# Patient Record
Sex: MALE | Race: WHITE | HISPANIC OR LATINO | Employment: OTHER | URBAN - METROPOLITAN AREA
[De-identification: names, ages, dates, MRNs, and addresses within clinical notes are randomized per-mention and may not be internally consistent; named-entity substitution may affect disease eponyms.]

---

## 2017-02-09 ENCOUNTER — OFFICE VISIT (OUTPATIENT)
Dept: FAMILY MEDICINE | Facility: CLINIC | Age: 72
End: 2017-02-09
Payer: COMMERCIAL

## 2017-02-09 VITALS
DIASTOLIC BLOOD PRESSURE: 70 MMHG | OXYGEN SATURATION: 98 % | TEMPERATURE: 97.8 F | HEART RATE: 65 BPM | BODY MASS INDEX: 29.16 KG/M2 | WEIGHT: 175 LBS | RESPIRATION RATE: 16 BRPM | SYSTOLIC BLOOD PRESSURE: 124 MMHG | HEIGHT: 65 IN

## 2017-02-09 DIAGNOSIS — J20.9 ACUTE BRONCHITIS, UNSPECIFIED ORGANISM: Primary | ICD-10-CM

## 2017-02-09 PROCEDURE — 99213 OFFICE O/P EST LOW 20 MIN: CPT | Performed by: INTERNAL MEDICINE

## 2017-02-09 ASSESSMENT — PAIN SCALES - GENERAL: PAINLEVEL: MILD PAIN (2)

## 2017-02-09 NOTE — NURSING NOTE
"Chief Complaint   Patient presents with     URI       Initial /70 mmHg  Pulse 65  Temp(Src) 97.8  F (36.6  C) (Oral)  Resp 16  Ht 5' 4.75\" (1.645 m)  Wt 175 lb (79.379 kg)  BMI 29.33 kg/m2  SpO2 98% Estimated body mass index is 29.33 kg/(m^2) as calculated from the following:    Height as of this encounter: 5' 4.75\" (1.645 m).    Weight as of this encounter: 175 lb (79.379 kg).  Medication Reconciliation: complete   Cynthia Abdullahi CMA      "

## 2017-02-09 NOTE — PATIENT INSTRUCTIONS
I suggest you try using Guaifenesin [ mucinex ] as well as other Supportive measures     Rest   Push fluids   Acetaminophen prn for fever and aches and pains  Guaifenesin [ mucinex ] can help for sinus congestion   Robitussin DM, 1-2 tsp q 4-6 hours can help with congestion / coughing   Sucrets or other throat lozenges can help for sore throat along with gargling with warm salt water     [facility]    If you have any questions regarding to your visit please contact your care team:     Team Pink:   Clinic Hours Telephone Number   Internal Medicine:  Dr. Tammy Hernandez, NP       7am-7pm  Monday - Thursday   7am-5pm  Fridays  [phone]  (Appointment scheduling available 24/7)    Questions about your visit?  Team Line  [phone]   Urgent Care - [place] and [facility] - 11am-9pm Monday-Friday Saturday-Sunday- 9am-5pm   [place] - 5pm-9pm Monday-Friday Saturday-Sunday- 9am-5pm  [phone] - [facility]  [phone] - [place]       What options do I have for visits at the clinic other than the traditional office visit?  To expand how we care for you, many of our providers are utilizing electronic visits (e-visits) and telephone visits, when medically appropriate, for interactions with their patients rather than a visit in the clinic.   We also offer nurse visits for many medical concerns. Just like any other service, we will bill your insurance company for this type of visit based on time spent on the phone with your provider. Not all insurance companies cover these visits. Please check with your medical insurance if this type of visit is covered. You will be responsible for any charges that are not paid by your insurance.      E-visits via [employer]:  generally incur a $35.00 fee.  Telephone visits:  Time spent on the phone: *charged based on time that is spent on the phone in increments of 10 minutes. Estimated cost:   5-10 mins $30.00   11-20  mins. $59.00   21-30 mins. $85.00   Use [employer] (secure email communication and access to your chart) to send your primary care provider a message or make an appointment. Ask someone on your Team how to sign up for [employer].    For a Price Quote for your services, please call our Consumer Price Line at [phone].    As always, Thank you for trusting us with your health care needs!    Discharged by SAMY Roach

## 2017-02-09 NOTE — PROGRESS NOTES
SUBJECTIVE:                                                    Chi Newman is a 72 year old male who presents to clinic today for the following health issues:    Acute bronchitis, unspecified organism     He suspected he had a sinus infection ? Sinus congestion, some sweatiness later in the evening, a lot of sinus congestion, taking Allegra (fexofenadine) and fluticasone (FLONASE) 50 MCG/ACT nasal spray , using the SinuCleanse  Neti Pot some.    RESPIRATORY SYMPTOMS      Duration: 2 weeks    Description  nasal congestion, rhinorrhea, facial pain/pressure, fever, chills, ear pain bilateral, headache, fatigue/malaise, hoarse voice and myalgias    Severity: moderate    Accompanying signs and symptoms: None    History (predisposing factors):  none    Precipitating or alleviating factors: None    Therapies tried and outcome:  rest and fluids oral decongestant antihistamine acetaminophen OTC NSAID       Problem list and histories reviewed & adjusted, as indicated.  Additional history: as documented    Patient Active Problem List   Diagnosis     Health Care Home     Advanced directives, counseling/discussion     Hyperlipidemia with target LDL less than 130     Diverticulosis of colon     Elevated liver enzymes     Hypovitaminosis D     Environmental allergies     Seasonal allergic rhinitis     Pneumonia     BPH (benign prostatic hyperplasia)     Diverticulitis of colon     Chronic nonalcoholic liver disease     Microscopic hematuria     Umbilical hernia     Elevated glucose     Elevated prostate specific antigen (PSA)     [place] of macula of both eyes     Posterior vitreous detachment of right eye     Nonexudative senile macular degeneration of retina     Senile nuclear sclerosis, bilateral     Slow transit constipation     Vitreous syneresis of left eye     PVD (posterior vitreous detachment), both eyes     Past Surgical History   Procedure Laterality Date     Arthroscopy knee  7/12/96     right knee, LMT      "Arthroscopy knee  1988     left knee, MMT     Tonsillectomy & adenoidectomy  age of 15     Colonoscopy  10/7/2002     diverticulosis, due again in      Small bowel resection  3/19/2014     small bowel resection, lysis of adhesions and pelvic drain placement     Colonoscopy  2013       Social History   Substance Use Topics     Smoking status: Never Smoker      Smokeless tobacco: Never Used     Alcohol Use: 0.0 oz/week     0 Standard drinks or equivalent per week      Comment: Occasional glass of wine and beer.     Family History   Problem Relation Age of Onset     DIABETES Brother      My brother  from complications of diabetes     CANCER Mother      Colon Cancer     Glaucoma No family hx of      Macular Degeneration No family hx of          Current Outpatient Prescriptions   Medication Sig Dispense Refill     pravastatin (PRAVACHOL) 20 MG tablet Take 1 tablet (20 mg) by mouth three times a week 36 tablet 3     fexofenadine (ALLEGRA) 180 MG tablet Take 1 tablet (180 mg) by mouth daily Take 180 mg by mouth once daily. Indications: SEASONAL ALLERGIC RHINITIS 90 tablet 3     fluticasone (FLONASE) 50 MCG/ACT nasal spray Spray 1-2 sprays into both nostrils daily 16 g 12     Propylene Glycol (SYSTANE BALANCE OP) Apply 1 drop to eye 4 times daily       ibuprofen (ADVIL,MOTRIN) 200 MG tablet Take 200 mg by mouth every 4 hours as needed       acetaminophen (TYLENOL) 500 MG tablet Take 500-1,000 mg by mouth every 6 hours as needed       NAPHAZOLINE-PHENIRAMINE OP \"OTC allergy eye drop-TARGET BRAND\" 2 drops into each eye daily as needed       Allergies   Allergen Reactions     Atorvastatin      Vytorin      Vancomycin Rash     Rash/hives     Recent Labs   Lab Test  10/18/16   1357  16   0921  16   1137  10/15/15   1412  10/08/14   0819  13   1453   A1C  5.5   --    --   5.6  5.4   --    LDL   --   123*  122*  150*  133*  137*   HDL   --   36*  35*   --   38*  28*   TRIG   --   147  186*   --   " "133   --    ALT  34   --    --   46  43  65   CR  0.99   --    --   0.91  1.09  0.98   GFRESTIMATED  75   --    --   83  67  77   GFRESTBLACK  >90   GFR Calc     --    --   >90   GFR Calc    81  >90   POTASSIUM  4.3   --    --   3.9  4.4  3.8   TSH   --    --    --    --    --   1.96      BP Readings from Last 3 Encounters:   02/09/17 124/70   10/18/16 126/64   09/13/16 106/62    Wt Readings from Last 3 Encounters:   02/09/17 175 lb (79.379 kg)   10/18/16 172 lb 3.2 oz (78.109 kg)   09/22/16 174 lb (78.926 kg)                  Labs reviewed in EPIC  Problem list, Medication list, Allergies, and Medical/Social/Surgical histories reviewed in [facility] and updated as appropriate.    ROS:  Constitutional, HEENT, cardiovascular, pulmonary, gi and gu systems are negative, except as otherwise noted.    OBJECTIVE:                                                    /70 mmHg  Pulse 65  Temp(Src) 97.8  F (36.6  C) (Oral)  Resp 16  Ht 5' 4.75\" (1.645 m)  Wt 175 lb (79.379 kg)  BMI 29.33 kg/m2  SpO2 98%  Body mass index is 29.33 kg/(m^2).  GENERAL APPEARANCE: healthy, alert and no distress  EYES: Eyes grossly normal to inspection, PERRL and conjunctivae and sclerae normal  HENT: ear canals and TM's normal and nose and mouth without ulcers or lesions  NECK: no adenopathy, no asymmetry, masses, or scars and thyroid normal to palpation  RESP: lungs clear to auscultation - no rales, rhonchi or wheezes  CV: regular rates and rhythm, normal S1 S2, no S3 or S4 and no murmur, click or rub    Diagnostic test results:  Diagnostic Test Results:  none      ASSESSMENT/PLAN:                                                    1. Acute bronchitis, unspecified organism  Mr. Chi Newman is a patient who has felt under the weather with some smouldering upper respiratory tract infection  Symptoms. He's had a total run of probably close to 2-3 weeks of feeling not up to snuff, however there were a few days " in the middle where he seemed better and this is suggestive of 2 sequential upper respiratory tract infections. We reviewed his symptoms and did the exam and this is strong consistent with a viral syndrome , no indications for antibiotics at this point. We discussed what to be on the watch for. Update me if significant changes have taken place. Supportive measures reviewed ,    Rest   Push fluids   Acetaminophen prn for fever and aches and pains  Guaifenesin [ mucinex ] can help for sinus congestion   Robitussin DM, 1-2 tsp q 4-6 hours can help with congestion / coughing   Sucrets or other throat lozenges can help for sore throat along with gargling with warm salt water         Follow up with Provider - depending on how things go , return to clinic for recheck in 7-10 days  if not improved      Isaac Knutson MD  [facility]

## 2017-02-09 NOTE — MR AVS SNAPSHOT
After Visit Summary   2/9/2017    Chi Newman    MRN: [id number]           Patient Information     Date Of Birth          1945        Visit Information        Provider Department      2/9/2017 1:50 PM Isaac Knutson MD [facility] Instructions    I suggest you try using Guaifenesin [ mucinex ] as well as other Supportive measures     Rest   Push fluids   Acetaminophen prn for fever and aches and pains  Guaifenesin [ mucinex ] can help for sinus congestion   Robitussin DM, 1-2 tsp q 4-6 hours can help with congestion / coughing   Sucrets or other throat lozenges can help for sore throat along with gargling with warm salt water     [facility]    If you have any questions regarding to your visit please contact your care team:     Team Pink:   Clinic Hours Telephone Number   Internal Medicine:  Dr. Tammy Hernandez NP       7am-7pm  Monday - Thursday   7am-5pm  Fridays  [phone]  (Appointment scheduling available 24/7)    Questions about your visit?  Team Line  [phone]   Urgent Care - [place] and [place] - 11am-9pm Monday-Friday Saturday-Sunday- 9am-5pm   [place] - 5pm-9pm Monday-Friday Saturday-Sunday- 9am-5pm  [phone] - [place]   [phone] - [place]       What options do I have for visits at the clinic other than the traditional office visit?  To expand how we care for you, many of our providers are utilizing electronic visits (e-visits) and telephone visits, when medically appropriate, for interactions with their patients rather than a visit in the clinic.   We also offer nurse visits for many medical concerns. Just like any other service, we will bill your insurance company for this type of visit based on time spent on the phone with your provider. Not all insurance companies cover these visits. Please check with your medical insurance if this type of visit is covered. You will be  responsible for any charges that are not paid by your insurance.      E-visits via [employer]:  generally incur a $35.00 fee.  Telephone visits:  Time spent on the phone: *charged based on time that is spent on the phone in increments of 10 minutes. Estimated cost:   5-10 mins $30.00   11-20 mins. $59.00   21-30 mins. $85.00   Use [employer] (secure email communication and access to your chart) to send your primary care provider a message or make an appointment. Ask someone on your Team how to sign up for [employer].    For a Price Quote for your services, please call our [facility] Line at [phone].    As always, Thank you for trusting us with your health care needs!    Discharged by SAMY Roach          Follow-ups after your visit        Who to contact     If you have questions or need follow up information about today's clinic visit or your schedule please contact [facility] directly at [phone].  Normal or non-critical lab and imaging results will be communicated to you by MyChart, letter or phone within 4 business days after the clinic has received the results. If you do not hear from us within 7 days, please contact the clinic through [employer] or phone. If you have a critical or abnormal lab result, we will notify you by phone as soon as possible.  Submit refill requests through [employer] or call your pharmacy and they will forward the refill request to us. Please allow 3 business days for your refill to be completed.          Additional Information About Your Visit        [employer] Information     [employer] gives you secure access to your electronic health record. If you see a primary care provider, you can also send messages to your care team and make appointments. If you have questions, please call your primary care clinic.  If you do not have a primary care provider, please call [phone] and they will assist you.        Care EveryWhere ID     This is your Care EveryWhere ID. This could be  "used by other organizations to access your [place] medical records  [id number]        Your Vitals Were     Pulse Temperature Respirations Height BMI (Body Mass Index) Pulse Oximetry    65 97.8  F (36.6  C) (Oral) 16 5' 4.75\" (1.645 m) 29.33 kg/m2 98%       Blood Pressure from Last 3 Encounters:   02/09/17 124/70   10/18/16 126/64   09/13/16 106/62    Weight from Last 3 Encounters:   02/09/17 175 lb (79.379 kg)   10/18/16 172 lb 3.2 oz (78.109 kg)   09/22/16 174 lb (78.926 kg)              Today, you had the following     No orders found for display       Primary Care Provider Office Phone # Fax #    Isaac Knutson [phone]       [address] [address]        Thank you!     Thank you for choosing [facility]  for your care. Our goal is always to provide you with excellent care. Hearing back from our patients is one way we can continue to improve our services. Please take a few minutes to complete the written survey that you may receive in the mail after your visit with us. Thank you!             Your Updated Medication List - Protect others around you: Learn how to safely use, store and throw away your medicines at www.disposemymeds.org.          This list is accurate as of: 2/9/17  2:25 PM.  Always use your most recent med list.                   Brand Name Dispense Instructions for use    acetaminophen 500 MG tablet    TYLENOL     Take 500-1,000 mg by mouth every 6 hours as needed       fexofenadine 180 MG tablet    ALLEGRA    90 tablet    Take 1 tablet (180 mg) by mouth daily Take 180 mg by mouth once daily. Indications: SEASONAL ALLERGIC RHINITIS       fluticasone 50 MCG/ACT spray    FLONASE    16 g    Spray 1-2 sprays into both nostrils daily       ibuprofen 200 MG tablet    ADVIL/MOTRIN     Take 200 mg by mouth every 4 hours as needed       NAPHAZOLINE-PHENIRAMINE OP      \"OTC allergy eye drop-TARGET BRAND\" 2 drops into each eye daily " as needed       pravastatin 20 MG tablet    PRAVACHOL    36 tablet    Take 1 tablet (20 mg) by mouth three times a week       SYSTANE BALANCE OP      Apply 1 drop to eye 4 times daily

## 2017-04-17 ENCOUNTER — MYC MEDICAL ADVICE (OUTPATIENT)
Dept: FAMILY MEDICINE | Facility: CLINIC | Age: 72
End: 2017-04-17

## 2017-04-17 DIAGNOSIS — K58.2 IRRITABLE BOWEL SYNDROME WITH BOTH CONSTIPATION AND DIARRHEA: Primary | ICD-10-CM

## 2017-04-17 NOTE — TELEPHONE ENCOUNTER
Patient does not have a diagnosis of irritable bowel and has not been seen for this?  Please advise    Jl Beckford RN

## 2017-04-17 NOTE — TELEPHONE ENCOUNTER
Called patient and scheduled him for an appointment tomorrow for lower abd pain, exacerbated after meals    Jl Beckford RN

## 2017-04-18 ENCOUNTER — OFFICE VISIT (OUTPATIENT)
Dept: FAMILY MEDICINE | Facility: CLINIC | Age: 72
End: 2017-04-18
Payer: COMMERCIAL

## 2017-04-18 VITALS
WEIGHT: 179 LBS | HEART RATE: 68 BPM | HEIGHT: 65 IN | SYSTOLIC BLOOD PRESSURE: 128 MMHG | TEMPERATURE: 97 F | OXYGEN SATURATION: 99 % | BODY MASS INDEX: 29.82 KG/M2 | DIASTOLIC BLOOD PRESSURE: 66 MMHG

## 2017-04-18 DIAGNOSIS — K57.32 DIVERTICULITIS OF COLON: ICD-10-CM

## 2017-04-18 DIAGNOSIS — R19.7 DIARRHEA, UNSPECIFIED TYPE: Primary | ICD-10-CM

## 2017-04-18 PROCEDURE — 99214 OFFICE O/P EST MOD 30 MIN: CPT | Performed by: INTERNAL MEDICINE

## 2017-04-18 ASSESSMENT — PAIN SCALES - GENERAL: PAINLEVEL: NO PAIN (0)

## 2017-04-18 NOTE — PROGRESS NOTES
SUBJECTIVE:                                                    Chi Newman is a 72 year old male who presents to clinic today for the following health issues:       Diarrhea, unspecified type  Diverticulitis of colon     -Note 4/1/2014  Diverticulitis of colon         Problem Detail      Noted:  4/1/2014      Overview Signed 4/1/2014 10:45 PM by Isaac Knutson MD     See [facility] documentation . Patient was admitted on 3/2/2014 and discharged on 3/31/2014. He had an acute diverticulitis clinical presentation and had severe symptoms And developed an ileus , then abscess and then a fistula formed. Interventional radiology placed a drain on 3/14 but this did not solve his troubles and he eventually went on to have a small bowel resection, lysis of adhesions and pelvic drain placement On 3/19. Pathology reports were negative for malignancy or ischemic vascular disease. Also developed rash to vancomycin . Was on Total parenteral nutrition (TPN) for a time.       - Note 4/18/2017  Abdominal Pain      Duration: Started on 4/13.  Had diarrhea for 3-4 days and now has abdominal pain with bloating    Description (location/character/radiation): 6 days       Associated flank pain: None    Intensity:  mild    Accompanying signs and symptoms:        Fever/Chills: no        Gas/Bloating: YES- both       Nausea/vomitting: no        Diarrhea: no        Dysuria or Hematuria: no     History (previous similar pain/trauma/previous testing):     Precipitating or alleviating factors:       Pain worse with eating/BM/urination: With BM       Pain relieved by BM: YES    Therapies tried and outcome: None    LMP:  not applicable    -- Patient states he had been taking antibiotics for an infection, but had to stop due to abdominal pain, diarrhea and fever. He reports that he continued to have diarrhea for about a week after antibiotics stopped and complains of still feeling bloated.       Problem list and histories reviewed &  adjusted, as indicated.  Additional history: as documented    Patient Active Problem List   Diagnosis     Health Care Home     Advanced directives, counseling/discussion     Hyperlipidemia with target LDL less than 130     Diverticulosis of colon     Elevated liver enzymes     Hypovitaminosis D     Environmental allergies     Seasonal allergic rhinitis     Pneumonia     BPH (benign prostatic hyperplasia)     Diverticulitis of colon     Chronic nonalcoholic liver disease     Microscopic hematuria     Umbilical hernia     Elevated glucose     Elevated prostate specific antigen (PSA)     [place] of macula of both eyes     Posterior vitreous detachment of right eye     Nonexudative senile macular degeneration of retina     Senile nuclear sclerosis, bilateral     Slow transit constipation     Vitreous syneresis of left eye     PVD (posterior vitreous detachment), both eyes     Past Surgical History:   Procedure Laterality Date     ARTHROSCOPY KNEE  96    right knee, LMT     ARTHROSCOPY KNEE  1988    left knee, MMT     COLONOSCOPY  10/7/2002    diverticulosis, due again in      COLONOSCOPY  2013     SMALL BOWEL RESECTION  3/19/2014    small bowel resection, lysis of adhesions and pelvic drain placement     TONSILLECTOMY & ADENOIDECTOMY  age of 15       Social History   Substance Use Topics     Smoking status: Never Smoker     Smokeless tobacco: Never Used     Alcohol use 0.0 oz/week     0 Standard drinks or equivalent per week      Comment: Occasional glass of wine and beer.     Family History   Problem Relation Age of Onset     DIABETES Brother      My brother  from complications of diabetes     CANCER Mother      Colon Cancer     Glaucoma No family hx of      Macular Degeneration No family hx of          Current Outpatient Prescriptions   Medication Sig Dispense Refill     pravastatin (PRAVACHOL) 20 MG tablet Take 1 tablet (20 mg) by mouth three times a week 36 tablet 3     fexofenadine (ALLEGRA) 180  "MG tablet Take 1 tablet (180 mg) by mouth daily Take 180 mg by mouth once daily. Indications: SEASONAL ALLERGIC RHINITIS 90 tablet 3     fluticasone (FLONASE) 50 MCG/ACT nasal spray Spray 1-2 sprays into both nostrils daily 16 g 12     Propylene Glycol (SYSTANE BALANCE OP) Apply 1 drop to eye 4 times daily       ibuprofen (ADVIL,MOTRIN) 200 MG tablet Take 200 mg by mouth every 4 hours as needed       acetaminophen (TYLENOL) 500 MG tablet Take 500-1,000 mg by mouth every 6 hours as needed       Labs reviewed in EPIC    Reviewed and updated as needed this visit by clinical staff  Tobacco  Allergies       Reviewed and updated as needed this visit by Provider         ROS:  Constitutional, HEENT, cardiovascular, pulmonary, GI, , musculoskeletal, neuro, skin, endocrine and psych systems are negative, except as otherwise noted.    This document serves as a record of the services and decisions personally performed and made by Isaac Knutson MD. It was created on their behalf by Chi Garcia, a trained medical scribe. The creation of this document is based the provider's statements to the medical scribe.  Cih Garcia April 18, 2017 12:01 PM    OBJECTIVE:                                                    /66  Pulse 68  Temp 97  F (36.1  C) (Oral)  Ht 1.645 m (5' 4.75\")  Wt 81.2 kg (179 lb)  SpO2 99%  BMI 30.02 kg/m2  Body mass index is 30.02 kg/(m^2).  GENERAL: healthy, alert and no distress  EYES: Eyes grossly normal to inspection, PERRL and conjunctivae and sclerae normal  ABDOMEN: soft, nontender, no hepatosplenomegaly, no masses and bowel sounds normal, negative ramirez's sign  SKIN: no suspicious lesions or rashes to visible skin  NEURO: mentation intact and speech normal  PSYCH: mentation appears normal, affect normal/bright    Diagnostic Test Results:  Results for orders placed or performed in visit on 10/18/16   Vitamin D Deficiency   Result Value Ref Range    Vitamin D Deficiency screening 24 20 " - 75 ug/L   Hemoglobin A1c   Result Value Ref Range    Hemoglobin A1C 5.5 4.3 - 6.0 %   Comprehensive metabolic panel   Result Value Ref Range    Sodium 139 133 - 144 mmol/L    Potassium 4.3 3.4 - 5.3 mmol/L    Chloride 105 94 - 109 mmol/L    Carbon Dioxide 28 20 - 32 mmol/L    Anion Gap 6 3 - 14 mmol/L    Glucose 94 70 - 99 mg/dL    Urea Nitrogen 14 7 - 30 mg/dL    Creatinine 0.99 0.66 - 1.25 mg/dL    GFR Estimate 75 >60 mL/min/1.7m2    GFR Estimate If Black >90   GFR Calc   >60 mL/min/1.7m2    Calcium 9.1 8.5 - 10.1 mg/dL    Bilirubin Total 0.4 0.2 - 1.3 mg/dL    Albumin 4.1 3.4 - 5.0 g/dL    Protein Total 7.7 6.8 - 8.8 g/dL    Alkaline Phosphatase 87 40 - 150 U/L    ALT 34 0 - 70 U/L    AST 26 0 - 45 U/L   CBC with platelets differential   Result Value Ref Range    WBC 6.4 4.0 - 11.0 10e9/L    RBC Count 5.43 4.4 - 5.9 10e12/L    Hemoglobin 14.6 13.3 - 17.7 g/dL    Hematocrit 44.1 40.0 - 53.0 %    MCV 81 78 - 100 fl    MCH 26.9 26.5 - 33.0 pg    MCHC 33.1 31.5 - 36.5 g/dL    RDW 15.0 10.0 - 15.0 %    Platelet Count 281 150 - 450 10e9/L    Diff Method Automated Method     % Neutrophils 60.8 %    % Lymphocytes 25.3 %    % Monocytes 10.0 %    % Eosinophils 3.1 %    % Basophils 0.8 %    Absolute Neutrophil 3.9 1.6 - 8.3 10e9/L    Absolute Lymphocytes 1.6 0.8 - 5.3 10e9/L    Absolute Monocytes 0.6 0.0 - 1.3 10e9/L    Absolute Eosinophils 0.2 0.0 - 0.7 10e9/L    Absolute Basophils 0.1 0.0 - 0.2 10e9/L   Erythrocyte sedimentation rate auto   Result Value Ref Range    Sed Rate 13 0 - 20 mm/h   CRP inflammation   Result Value Ref Range    CRP Inflammation <2.9 0.0 - 8.0 mg/L        ASSESSMENT/PLAN:                                                      (R19.7) Diarrhea, unspecified type  (primary encounter diagnosis)  Comment: this patient had a lot of worries about what sounds like he simply had antibiotic associated diarrhea associated with antibiotics taken for a dental infection.  Plan: his diarrhea has  resolved and requires no specific workup or stool studies to include ova and parasites, cdiff etc.  I was also worried that he was suffering with a irritable bowel syndrome picture but after today's history and physical exam and data review I have excluded this diagnosis.    (K57.32) Diverticulitis of colon  Comment: he has had some smouldering abdominal pain symptoms and he was worried about possible diverticulitis. We had a ground up review of this diagnosis and it's relationship to diverticulosis. His current history and exam do not support a diagnosis of diverticulitis   Plan: treatment plan reviewed , We discussed what to be on the watch for, update me if significant changes have taken place     25 minutes was spent with the patient with greater than 50% in face-to-face discussion of disease process, treatment options and medicine management.      Patient Instructions   - If symptoms do not get better, follow up with me.      The information in this document, created by the medical scribe for me, accurately reflects the services I personally performed and the decisions made by me. I have reviewed and approved this document for accuracy.   MD Isaac Joya MD  [facility]

## 2017-04-18 NOTE — NURSING NOTE
"Chief Complaint   Patient presents with     Gastrointestinal Problem     IBS symptoms for 3-4 days       Initial /66  Pulse 68  Temp 97  F (36.1  C) (Oral)  Ht 5' 4.75\" (1.645 m)  Wt 179 lb (81.2 kg)  SpO2 99%  BMI 30.02 kg/m2 Estimated body mass index is 30.02 kg/(m^2) as calculated from the following:    Height as of this encounter: 5' 4.75\" (1.645 m).    Weight as of this encounter: 179 lb (81.2 kg).  Medication Reconciliation: complete   Dione Mcgarry MA    "

## 2017-04-18 NOTE — PATIENT INSTRUCTIONS
- If symptoms do not get better in 2-4 weeks, follow up with me.    [facility]    If you have any questions regarding to your visit please contact your care team:     Team Pink:   Clinic Hours Telephone Number   Internal Medicine:  Dr. Tammy Hernandez, NP       7am-7pm  Monday - Thursday   7am-5pm  Fridays  [phone]  (Appointment scheduling available 24/7)    Questions about your visit?  Team Line  [phone]   Urgent Care - [place] and [place] - 11am-9pm Monday-Friday Saturday-Sunday- 9am-5pm   [place] - 5pm-9pm Monday-Friday Saturday-Sunday- 9am-5pm  [phone] - [place]   [phone] - [place]       What options do I have for visits at the clinic other than the traditional office visit?  To expand how we care for you, many of our providers are utilizing electronic visits (e-visits) and telephone visits, when medically appropriate, for interactions with their patients rather than a visit in the clinic.   We also offer nurse visits for many medical concerns. Just like any other service, we will bill your insurance company for this type of visit based on time spent on the phone with your provider. Not all insurance companies cover these visits. Please check with your medical insurance if this type of visit is covered. You will be responsible for any charges that are not paid by your insurance.      E-visits via [employer]:  generally incur a $35.00 fee.  Telephone visits:  Time spent on the phone: *charged based on time that is spent on the phone in increments of 10 minutes. Estimated cost:   5-10 mins $30.00   11-20 mins. $59.00   21-30 mins. $85.00   Use [employer] (secure email communication and access to your chart) to send your primary care provider a message or make an appointment. Ask someone on your Team how to sign up for [employer].    For a Price Quote for your services, please call our Consumer Price Line at [phone].    As  always, Thank you for trusting us with your health care needs!    Discharged By:  LADY SCHAFFER

## 2017-04-18 NOTE — MR AVS SNAPSHOT
After Visit Summary   4/18/2017    Chi Newman    MRN: [id number]           Patient Information     Date Of Birth          1945        Visit Information        Provider Department      4/18/2017 11:50 AM Isaac Knutson MD [facility]        Today's Diagnoses     Diarrhea, unspecified type    -  1    Diverticulitis of colon          Care Instructions    - If symptoms do not get better in 2-4 weeks, follow up with me.    [facility]    If you have any questions regarding to your visit please contact your care team:     Team Pink:   Clinic Hours Telephone Number   Internal Medicine:  Dr. Tammy Hernandez NP       7am-7pm  Monday - Thursday   7am-5pm  Fridays  [phone]  (Appointment scheduling available 24/7)    Questions about your visit?  Team Line  [phone]   Urgent Care - [place] and [place] - 11am-9pm Monday-Friday Saturday-Sunday- 9am-5pm   [place] - 5pm-9pm Monday-Friday Saturday-Sunday- 9am-5pm  [phone] - [place]   [phone] - [place]       What options do I have for visits at the clinic other than the traditional office visit?  To expand how we care for you, many of our providers are utilizing electronic visits (e-visits) and telephone visits, when medically appropriate, for interactions with their patients rather than a visit in the clinic.   We also offer nurse visits for many medical concerns. Just like any other service, we will bill your insurance company for this type of visit based on time spent on the phone with your provider. Not all insurance companies cover these visits. Please check with your medical insurance if this type of visit is covered. You will be responsible for any charges that are not paid by your insurance.      E-visits via [employer]:  generally incur a $35.00 fee.  Telephone visits:  Time spent on the phone: *charged based on time that is spent on the phone in  "increments of 10 minutes. Estimated cost:   5-10 mins $30.00   11-20 mins. $59.00   21-30 mins. $85.00   Use [employer] (secure email communication and access to your chart) to send your primary care provider a message or make an appointment. Ask someone on your Team how to sign up for [employer].    For a Price Quote for your services, please call our [employer] Line at [phone].    As always, Thank you for trusting us with your health care needs!    Discharged By:  LADY SCHAFFER          Follow-ups after your visit        Who to contact     If you have questions or need follow up information about today's clinic visit or your schedule please contact [facility] directly at [phone].  Normal or non-critical lab and imaging results will be communicated to you by MyChart, letter or phone within 4 business days after the clinic has received the results. If you do not hear from us within 7 days, please contact the clinic through [employer] or phone. If you have a critical or abnormal lab result, we will notify you by phone as soon as possible.  Submit refill requests through [employer] or call your pharmacy and they will forward the refill request to us. Please allow 3 business days for your refill to be completed.          Additional Information About Your Visit        [employer] Information     [employer] gives you secure access to your electronic health record. If you see a primary care provider, you can also send messages to your care team and make appointments. If you have questions, please call your primary care clinic.  If you do not have a primary care provider, please call [phone] and they will assist you.        Care EveryWhere ID     This is your Care EveryWhere ID. This could be used by other organizations to access your [place] medical records  [id number]        Your Vitals Were     Pulse Temperature Height Pulse Oximetry BMI (Body Mass Index)       68 97  F (36.1  C) (Oral) 5' 4.75\" (1.645 m) 99% " 30.02 kg/m2        Blood Pressure from Last 3 Encounters:   04/18/17 128/66   02/09/17 124/70   10/18/16 126/64    Weight from Last 3 Encounters:   04/18/17 179 lb (81.2 kg)   02/09/17 175 lb (79.4 kg)   10/18/16 172 lb 3.2 oz (78.1 kg)              Today, you had the following     No orders found for display       Primary Care Provider Office Phone # Fax #    Isaac Knutson [phone]       [address] [address]        Thank you!     Thank you for choosing [facility]  for your care. Our goal is always to provide you with excellent care. Hearing back from our patients is one way we can continue to improve our services. Please take a few minutes to complete the written survey that you may receive in the mail after your visit with us. Thank you!             Your Updated Medication List - Protect others around you: Learn how to safely use, store and throw away your medicines at www.disposemymeds.org.          This list is accurate as of: 4/18/17 12:30 PM.  Always use your most recent med list.                   Brand Name Dispense Instructions for use    acetaminophen 500 MG tablet    TYLENOL     Take 500-1,000 mg by mouth every 6 hours as needed       fexofenadine 180 MG tablet    ALLEGRA    90 tablet    Take 1 tablet (180 mg) by mouth daily Take 180 mg by mouth once daily. Indications: SEASONAL ALLERGIC RHINITIS       fluticasone 50 MCG/ACT spray    FLONASE    16 g    Spray 1-2 sprays into both nostrils daily       ibuprofen 200 MG tablet    ADVIL/MOTRIN     Take 200 mg by mouth every 4 hours as needed       pravastatin 20 MG tablet    PRAVACHOL    36 tablet    Take 1 tablet (20 mg) by mouth three times a week       SYSTANE BALANCE OP      Apply 1 drop to eye 4 times daily

## 2017-06-23 ENCOUNTER — MYC MEDICAL ADVICE (OUTPATIENT)
Dept: FAMILY MEDICINE | Facility: CLINIC | Age: 72
End: 2017-06-23

## 2017-07-19 DIAGNOSIS — Z91.09 ENVIRONMENTAL ALLERGIES: ICD-10-CM

## 2017-07-19 DIAGNOSIS — J30.2 SEASONAL ALLERGIC RHINITIS: ICD-10-CM

## 2017-07-19 RX ORDER — FLUTICASONE PROPIONATE 50 MCG
SPRAY, SUSPENSION (ML) NASAL
Qty: 16 G | Refills: 12
Start: 2017-07-19

## 2017-07-19 NOTE — TELEPHONE ENCOUNTER
Talked to pharmacy and let them know prescription was sent to [facility] in [place].     Bushra Hendricks MA

## 2017-09-18 ENCOUNTER — MYC MEDICAL ADVICE (OUTPATIENT)
Dept: INTERNAL MEDICINE | Facility: CLINIC | Age: 72
End: 2017-09-18

## 2017-10-02 ENCOUNTER — MYC MEDICAL ADVICE (OUTPATIENT)
Dept: INTERNAL MEDICINE | Facility: CLINIC | Age: 72
End: 2017-10-02

## 2017-10-11 ENCOUNTER — ALLIED HEALTH/NURSE VISIT (OUTPATIENT)
Dept: NURSING | Facility: CLINIC | Age: 72
End: 2017-10-11
Payer: COMMERCIAL

## 2017-10-11 DIAGNOSIS — Z23 NEED FOR PROPHYLACTIC VACCINATION AND INOCULATION AGAINST INFLUENZA: Primary | ICD-10-CM

## 2017-10-11 PROCEDURE — G0008 ADMIN INFLUENZA VIRUS VAC: HCPCS

## 2017-10-11 PROCEDURE — 99207 ZZC NO CHARGE NURSE ONLY: CPT

## 2017-10-11 PROCEDURE — 90662 IIV NO PRSV INCREASED AG IM: CPT

## 2017-10-11 NOTE — MR AVS SNAPSHOT
After Visit Summary   10/11/2017    Chi Newman    MRN: [id number]           Patient Information     Date Of Birth          1945        Visit Information        Provider Department      10/11/2017 11:00 AM [facility]        Today's Diagnoses     Need for prophylactic vaccination and inoculation against influenza    -  1       Follow-ups after your visit        Your next 10 appointments already scheduled     Oct 11, 2017 11:00 AM CDT   Nurse Only with [facility]   [facility] ([facility])    [address] [id number] [phone]            Nov 14, 2017 10:30 AM CST   New Visit with Connie Martinez MD   [facility] ([facility])    [address] [id number] [phone]              Who to contact     If you have questions or need follow up information about today's clinic visit or your schedule please contact [facility] directly at [phone].  Normal or non-critical lab and imaging results will be communicated to you by [employer], letter or phone within 4 business days after the clinic has received the results. If you do not hear from us within 7 days, please contact the clinic through [employer] or phone. If you have a critical or abnormal lab result, we will notify you by phone as soon as possible.  Submit refill requests through [employer] or call your pharmacy and they will forward the refill request to us. Please allow 3 business days for your refill to be completed.          Additional Information About Your Visit        [employer] Information     [employer] gives you secure access to your electronic health record. If you see a primary care provider, you can also send messages to your care team and make appointments. If you have questions, please call your primary care clinic.  If you do not have a primary care provider, please call [phone] and they will  assist you.        Care EveryWhere ID     This is your Care EveryWhere ID. This could be used by other organizations to access your [place] medical records  [id number]         Blood Pressure from Last 3 Encounters:   04/18/17 128/66   02/09/17 124/70   10/18/16 126/64    Weight from Last 3 Encounters:   04/18/17 179 lb (81.2 kg)   02/09/17 175 lb (79.4 kg)   10/18/16 172 lb 3.2 oz (78.1 kg)              We Performed the Following     ADMIN INFLUENZA (For MEDICARE Patients ONLY) []     FLU VACCINE, INCREASED ANTIGEN, PRESV FREE, AGE 65+ [45389]        Primary Care Provider Office Phone # Fax #    Isaac Knutson [phone] [address] [address]        Equal Access to Services     [facility] : Hadii aad ku hadasho Soomaali, waaxda luqadaha, qaybta kaalmada adeegyada, colin barton haypavithra martinez . So [facility] [phone].    ATENCIÓN: Si habla español, tiene a gilliam disposición servicios gratuitos de asistencia lingüística. Llame al [phone].    We comply with applicable federal civil rights laws and [place] laws. We do not discriminate on the basis of race, color, national origin, age, disability, sex, sexual orientation, or gender identity.            Thank you!     Thank you for choosing [facility]  for your care. Our goal is always to provide you with excellent care. Hearing back from our patients is one way we can continue to improve our services. Please take a few minutes to complete the written survey that you may receive in the mail after your visit with us. Thank you!             Your Updated Medication List - Protect others around you: Learn how to safely use, store and throw away your medicines at www.disposemymeds.org.          This list is accurate as of: 10/11/17 10:50 AM.  Always use your most recent med list.                   Brand Name Dispense Instructions for use Diagnosis    acetaminophen 500 MG tablet    TYLENOL     Take  500-1,000 mg by mouth every 6 hours as needed        fexofenadine 180 MG tablet    ALLEGRA    90 tablet    Take 1 tablet (180 mg) by mouth daily Take 180 mg by mouth once daily. Indications: SEASONAL ALLERGIC RHINITIS    Environmental allergies, Seasonal allergic rhinitis       fluticasone 50 MCG/ACT spray    FLONASE    16 g    Spray 1-2 sprays into both nostrils daily    Environmental allergies, Seasonal allergic rhinitis       ibuprofen 200 MG tablet    ADVIL/MOTRIN     Take 200 mg by mouth every 4 hours as needed        pravastatin 20 MG tablet    PRAVACHOL    36 tablet    Take 1 tablet (20 mg) by mouth three times a week    Hyperlipidemia with target LDL less than 130       SYSTANE BALANCE OP      Apply 1 drop to eye 4 times daily

## 2017-10-11 NOTE — NURSING NOTE
Prior to injection verified patient identity using patient's name and date of birth.  Rachel SUMMERS CMA ([facility])

## 2017-10-13 ENCOUNTER — MYC MEDICAL ADVICE (OUTPATIENT)
Dept: INTERNAL MEDICINE | Facility: CLINIC | Age: 72
End: 2017-10-13

## 2017-10-28 ENCOUNTER — MYC MEDICAL ADVICE (OUTPATIENT)
Dept: INTERNAL MEDICINE | Facility: CLINIC | Age: 72
End: 2017-10-28

## 2017-10-30 NOTE — TELEPHONE ENCOUNTER
Left message on answering machine for patient to call back. Or check mychart needs to be seen.  Vianca Young RN

## 2017-10-30 NOTE — PROGRESS NOTES
SUBJECTIVE:   Chi Newman is a 72 year old male who presents to clinic today for the following health issues:    GI Problem -- Patient states he has had dark stools in the last few days. He notes at the last visit here he was having some IRRITABLE BOWEL SYNDROME  symptoms. He has been waking up in the last 3 months because of bowel movements. In addition, he has had blurry vision, dizziness, nighttime diaphoresis, headaches, and arthralgias. When he would wake up with diaphoresis, he would drink water and read for a bit before going back to sleep. Nighttime diaphoresis has been around for a long time, but have been more intense in the last 1.5 months. He notes symptoms occur about 3-4 times a week. Says he also has been sleeping more often and has significant fatigue after eating meals. Denies using Pepto-Bismol or iron supplements. Denies cough. No truly alarming symptoms such as unintentional weight loss or B symptoms [ refering to systemic symptoms of fever, night sweats, and weight loss which can be associated with both Hodgkin's lymphoma and non-Hodgkin's lymphoma. The presence or absence of B symptoms has prognostic significance and is reflected in the staging of these lymphomas.]    Patient reports he had a root canal which resulted in infection treated with antibiotic. When being treated with antibiotics, he had severe abdominal pain and diarrhea.     Hyperlipidemia -- Notes he has not been taking Pravachol [Pravastatin] for the last year. Is curious if he should start taking it again.  Recent Labs   Lab Test  06/14/16   0921  01/21/16   1137   10/08/14   0819   05/20/13   1106   CHOL  188  194   --   198   < >  228*   HDL  36*  35*   --   38*   < >  36*   LDL  123*  122*   < >  133*   < >  140*   TRIG  147  186*   --   133   --   257*   CHOLHDLRATIO   --    --    --   5.2*   --   6.3*    < > = values in this interval not displayed.     Problem list and histories reviewed & adjusted, as  indicated.  Additional history: as documented    Patient Active Problem List   Diagnosis     Health Care Home     Advanced directives, counseling/discussion     Hyperlipidemia with target LDL less than 130     Diverticulosis of colon     Elevated liver enzymes     Hypovitaminosis D     Environmental allergies     Seasonal allergic rhinitis     Pneumonia     BPH (benign prostatic hyperplasia)     Diverticulitis of colon     Chronic nonalcoholic liver disease     Microscopic hematuria     Umbilical hernia     Elevated glucose     Elevated prostate specific antigen (PSA)     [place] of macula of both eyes     Posterior vitreous detachment of right eye     Nonexudative senile macular degeneration of retina     Senile nuclear sclerosis, bilateral     Slow transit constipation     Vitreous syneresis of left eye     PVD (posterior vitreous detachment), both eyes     History of diverticulitis of colon     History of partial colectomy     Past Surgical History:   Procedure Laterality Date     ARTHROSCOPY KNEE  96    right knee, LMT     ARTHROSCOPY KNEE  1988    left knee, MMT     COLONOSCOPY  10/7/2002    diverticulosis, due again in      COLONOSCOPY  2013     SMALL BOWEL RESECTION  3/19/2014    small bowel resection, lysis of adhesions and pelvic drain placement     TONSILLECTOMY & ADENOIDECTOMY  age of 15       Social History   Substance Use Topics     Smoking status: Never Smoker     Smokeless tobacco: Never Used     Alcohol use 0.0 oz/week     0 Standard drinks or equivalent per week      Comment: Occasional glass of wine and beer.     Family History   Problem Relation Age of Onset     CANCER Mother      Colon Cancer     DIABETES Brother      My brother  from complications of diabetes     Glaucoma No family hx of      Macular Degeneration No family hx of          Current Outpatient Prescriptions   Medication Sig Dispense Refill     fluticasone (FLONASE) 50 MCG/ACT spray Spray 1-2 sprays into both  "nostrils daily 16 g 9     fexofenadine (ALLEGRA) 180 MG tablet Take 1 tablet (180 mg) by mouth daily Take 180 mg by mouth once daily. Indications: SEASONAL ALLERGIC RHINITIS 90 tablet 3     Propylene Glycol (SYSTANE BALANCE OP) Apply 1 drop to eye 4 times daily       ibuprofen (ADVIL,MOTRIN) 200 MG tablet Take 200 mg by mouth every 4 hours as needed       acetaminophen (TYLENOL) 500 MG tablet Take 500-1,000 mg by mouth every 6 hours as needed       pravastatin (PRAVACHOL) 20 MG tablet Take 1 tablet (20 mg) by mouth three times a week (Patient not taking: Reported on 10/31/2017) 36 tablet 3     Labs reviewed in EPIC      Reviewed and updated as needed this visit by clinical staff  Tobacco  Allergies  Meds  Med Hx  Surg Hx  Fam Hx  Soc Hx      Reviewed and updated as needed this visit by Provider         ROS:  Constitutional, HEENT, cardiovascular, pulmonary, GI, , musculoskeletal, neuro, skin, endocrine and psych systems are negative, except as otherwise noted.    This document serves as a record of the services and decisions personally performed and made by Isaac Knutson MD. It was created on their behalf by Chi Garcia, a trained medical scribe. The creation of this document is based the provider's statements to the medical scribe.  Chi Garcia October 31, 2017 11:52 AM    OBJECTIVE:   BP (!) 154/92  Pulse 69  Temp 97.9  F (36.6  C) (Oral)  Ht 1.645 m (5' 4.75\")  Wt 79.8 kg (176 lb)  SpO2 100%  BMI 29.51 kg/m2  Body mass index is 29.51 kg/(m^2).  GENERAL: healthy, alert and no distress, answers all questions appropriately, appropriate grooming and affect, appears stated age   EYES: Eyes grossly normal to inspection, EOMI, conjunctivae and sclerae normal  RESP: lungs clear to auscultation - no rales, rhonchi or wheezes  CV: regular rate and rhythm, normal S1 S2, no S3 or S4, no murmur, click or rub, no peripheral edema and peripheral pulses strong  ABDOMEN: soft, nontender, no " hepatosplenomegaly, no masses and bowel sounds normal, absolutely no peritoneal signs , surgical scars appropriate for past surgical history of diverticulitis surgery / partial colon resection   SKIN: seborrheic keratosis in center of chest  NEURO: mentation intact and speech normal  PSYCH: mentation appears normal, affect normal/bright    Diagnostic Test Results:  Results for orders placed or performed in visit on 10/18/16   Vitamin D Deficiency   Result Value Ref Range    Vitamin D Deficiency screening 24 20 - 75 ug/L   Hemoglobin A1c   Result Value Ref Range    Hemoglobin A1C 5.5 4.3 - 6.0 %   Comprehensive metabolic panel   Result Value Ref Range    Sodium 139 133 - 144 mmol/L    Potassium 4.3 3.4 - 5.3 mmol/L    Chloride 105 94 - 109 mmol/L    Carbon Dioxide 28 20 - 32 mmol/L    Anion Gap 6 3 - 14 mmol/L    Glucose 94 70 - 99 mg/dL    Urea Nitrogen 14 7 - 30 mg/dL    Creatinine 0.99 0.66 - 1.25 mg/dL    GFR Estimate 75 >60 mL/min/1.7m2    GFR Estimate If Black >90   GFR Calc   >60 mL/min/1.7m2    Calcium 9.1 8.5 - 10.1 mg/dL    Bilirubin Total 0.4 0.2 - 1.3 mg/dL    Albumin 4.1 3.4 - 5.0 g/dL    Protein Total 7.7 6.8 - 8.8 g/dL    Alkaline Phosphatase 87 40 - 150 U/L    ALT 34 0 - 70 U/L    AST 26 0 - 45 U/L   CBC with platelets differential   Result Value Ref Range    WBC 6.4 4.0 - 11.0 10e9/L    RBC Count 5.43 4.4 - 5.9 10e12/L    Hemoglobin 14.6 13.3 - 17.7 g/dL    Hematocrit 44.1 40.0 - 53.0 %    MCV 81 78 - 100 fl    MCH 26.9 26.5 - 33.0 pg    MCHC 33.1 31.5 - 36.5 g/dL    RDW 15.0 10.0 - 15.0 %    Platelet Count 281 150 - 450 10e9/L    Diff Method Automated Method     % Neutrophils 60.8 %    % Lymphocytes 25.3 %    % Monocytes 10.0 %    % Eosinophils 3.1 %    % Basophils 0.8 %    Absolute Neutrophil 3.9 1.6 - 8.3 10e9/L    Absolute Lymphocytes 1.6 0.8 - 5.3 10e9/L    Absolute Monocytes 0.6 0.0 - 1.3 10e9/L    Absolute Eosinophils 0.2 0.0 - 0.7 10e9/L    Absolute Basophils 0.1 0.0 - 0.2  10e9/L   Erythrocyte sedimentation rate auto   Result Value Ref Range    Sed Rate 13 0 - 20 mm/h   CRP inflammation   Result Value Ref Range    CRP Inflammation <2.9 0.0 - 8.0 mg/L     ASSESSMENT/PLAN:     (H53.8) Blurred vision  (primary encounter diagnosis)  Comment: I am following along with patients symptoms ,   Broad screening laboratory studies   Plan: Erythrocyte sedimentation rate auto, CRP         inflammation, CBC with platelets differential,         Comprehensive metabolic panel        Further follow up depending on the test results and depending on how things go     (G44.219) Episodic tension-type headache, not intractable  Comment: as above    Plan: Erythrocyte sedimentation rate auto, CRP         inflammation, CBC with platelets differential,         Comprehensive metabolic panel            (K92.1) Black stools  Comment: as above   Plan: Erythrocyte sedimentation rate auto, CRP         inflammation, CBC with platelets differential,         Comprehensive metabolic panel            (R73.09) Elevated glucose  Comment: doubt clinical significance but warrants a1c   Plan: Erythrocyte sedimentation rate auto, CRP         inflammation, CBC with platelets differential,         Comprehensive metabolic panel            (Z87.19) History of diverticulitis of colon  Comment: as above   Plan: Erythrocyte sedimentation rate auto, CRP         inflammation, CBC with platelets differential,         Comprehensive metabolic panel            (Z90.49) History of partial colectomy  Comment: as above   Plan: Erythrocyte sedimentation rate auto, CRP         inflammation, CBC with platelets differential,         Comprehensive metabolic panel            (E78.5) Hyperlipidemia with target LDL less than 130  Comment: due for this test / procedure / healthcare maintenance   Plan: Lipid panel reflex to direct LDL Fasting            (R74.8) Elevated liver enzymes  Comment: as above   Plan: Hemoglobin A1c            (R03.0) Elevated  blood pressure reading without diagnosis of hypertension  Comment: recheck was better but still not within normal limits   Plan: Medical assistant blood pressure recheck in 2-3 weeks and if still elevated will start medications      Patient Instructions   - I will follow up with you about lab results.     The information in this document, created by the medical scribe for me, accurately reflects the services I personally performed and the decisions made by me. I have reviewed and approved this document for accuracy.   MD Isaac Joya MD  [facility]    Chart note addendum ;    All of this patients laboratory studies came back unremarkable . Not exactly sure what more to do. Suggested gastroenterological consultation / follow up / second opinion and patient is eager for this. See orders section of this encounter , referral orders placed.    Isaac Knutson MD

## 2017-10-30 NOTE — TELEPHONE ENCOUNTER
Chi Newman is a 72 year old male who calls with Dark stool, occasional dizziness. Denies abdominal pain, denies fever.  Has had some body aches, thinks this could be from the flu shot    NURSING ASSESSMENT:  Description:  Dark stools  Onset/duration:  3 days   Precip. factors:  Patient had flu shot on 10/11/17 an is wondering if this could be the cause  Associated symptoms:  Occasional dizzness   Improves/worsens symptoms:  Resting improves symptoms  Last exam/Treatment:  4/18/17  Allergies:   Allergies   Allergen Reactions     Atorvastatin      Vytorin      Vancomycin Rash     Rash/hives       MEDICATIONS:   Taking medication(s) as prescribed? Yes  Taking over the counter medication(s?) No  Any medication side effects? No significant side effects    Any barriers to taking medication(s) as prescribed?  No  Medication(s) improving/managing symptoms?  N/A  Medication reconciliation completed: Yes      NURSING PLAN: Nursing advice to patient come in for OV on 10/31/17- call clinic or go to Ed if symptoms worsen    RECOMMENDED DISPOSITION:  See in 24 hours - patient is scheduled with PCP on 10/31/17  Will comply with recommendation: Yes  If further questions/concerns or if symptoms do not improve, worsen or new symptoms develop, call your PCP or [facility] as soon as possible.      Guideline used:  Telephone Triage Protocols for Nurses, Fourth Edition, Marjan Maria RN

## 2017-10-31 ENCOUNTER — OFFICE VISIT (OUTPATIENT)
Dept: INTERNAL MEDICINE | Facility: CLINIC | Age: 72
End: 2017-10-31
Payer: COMMERCIAL

## 2017-10-31 VITALS
SYSTOLIC BLOOD PRESSURE: 144 MMHG | WEIGHT: 176 LBS | HEIGHT: 65 IN | TEMPERATURE: 97.9 F | DIASTOLIC BLOOD PRESSURE: 80 MMHG | BODY MASS INDEX: 29.32 KG/M2 | OXYGEN SATURATION: 100 % | HEART RATE: 69 BPM

## 2017-10-31 DIAGNOSIS — Z90.49 HISTORY OF PARTIAL COLECTOMY: ICD-10-CM

## 2017-10-31 DIAGNOSIS — R73.09 ELEVATED GLUCOSE: ICD-10-CM

## 2017-10-31 DIAGNOSIS — R74.8 ELEVATED LIVER ENZYMES: ICD-10-CM

## 2017-10-31 DIAGNOSIS — R03.0 ELEVATED BLOOD PRESSURE READING WITHOUT DIAGNOSIS OF HYPERTENSION: ICD-10-CM

## 2017-10-31 DIAGNOSIS — H53.8 BLURRED VISION: Primary | ICD-10-CM

## 2017-10-31 DIAGNOSIS — Z87.19 HISTORY OF DIVERTICULITIS OF COLON: ICD-10-CM

## 2017-10-31 DIAGNOSIS — K92.1 BLACK STOOLS: ICD-10-CM

## 2017-10-31 DIAGNOSIS — G44.219 EPISODIC TENSION-TYPE HEADACHE, NOT INTRACTABLE: ICD-10-CM

## 2017-10-31 DIAGNOSIS — E78.5 HYPERLIPIDEMIA WITH TARGET LDL LESS THAN 130: ICD-10-CM

## 2017-10-31 LAB
ALBUMIN SERPL-MCNC: 4.1 G/DL (ref 3.4–5)
ALP SERPL-CCNC: 91 U/L (ref 40–150)
ALT SERPL W P-5'-P-CCNC: 71 U/L (ref 0–70)
ANION GAP SERPL CALCULATED.3IONS-SCNC: 7 MMOL/L (ref 3–14)
AST SERPL W P-5'-P-CCNC: 42 U/L (ref 0–45)
BASOPHILS # BLD AUTO: 0.1 10E9/L (ref 0–0.2)
BASOPHILS NFR BLD AUTO: 0.9 %
BILIRUB SERPL-MCNC: 0.4 MG/DL (ref 0.2–1.3)
BUN SERPL-MCNC: 12 MG/DL (ref 7–30)
CALCIUM SERPL-MCNC: 9.3 MG/DL (ref 8.5–10.1)
CHLORIDE SERPL-SCNC: 105 MMOL/L (ref 94–109)
CHOLEST SERPL-MCNC: 222 MG/DL
CO2 SERPL-SCNC: 26 MMOL/L (ref 20–32)
CREAT SERPL-MCNC: 0.86 MG/DL (ref 0.66–1.25)
CRP SERPL-MCNC: <2.9 MG/L (ref 0–8)
DIFFERENTIAL METHOD BLD: NORMAL
EOSINOPHIL # BLD AUTO: 0.1 10E9/L (ref 0–0.7)
EOSINOPHIL NFR BLD AUTO: 1.7 %
ERYTHROCYTE [DISTWIDTH] IN BLOOD BY AUTOMATED COUNT: 14.2 % (ref 10–15)
ERYTHROCYTE [SEDIMENTATION RATE] IN BLOOD BY WESTERGREN METHOD: 16 MM/H (ref 0–20)
GFR SERPL CREATININE-BSD FRML MDRD: 88 ML/MIN/1.7M2
GLUCOSE SERPL-MCNC: 89 MG/DL (ref 70–99)
HBA1C MFR BLD: 5.4 % (ref 4.3–6)
HCT VFR BLD AUTO: 42.2 % (ref 40–53)
HDLC SERPL-MCNC: 38 MG/DL
HGB BLD-MCNC: 14.6 G/DL (ref 13.3–17.7)
LDLC SERPL CALC-MCNC: 135 MG/DL
LYMPHOCYTES # BLD AUTO: 1.4 10E9/L (ref 0.8–5.3)
LYMPHOCYTES NFR BLD AUTO: 19.7 %
MCH RBC QN AUTO: 29.2 PG (ref 26.5–33)
MCHC RBC AUTO-ENTMCNC: 34.6 G/DL (ref 31.5–36.5)
MCV RBC AUTO: 84 FL (ref 78–100)
MONOCYTES # BLD AUTO: 0.7 10E9/L (ref 0–1.3)
MONOCYTES NFR BLD AUTO: 9.4 %
NEUTROPHILS # BLD AUTO: 4.8 10E9/L (ref 1.6–8.3)
NEUTROPHILS NFR BLD AUTO: 68.3 %
NONHDLC SERPL-MCNC: 184 MG/DL
PLATELET # BLD AUTO: 303 10E9/L (ref 150–450)
POTASSIUM SERPL-SCNC: 4.2 MMOL/L (ref 3.4–5.3)
PROT SERPL-MCNC: 8 G/DL (ref 6.8–8.8)
RBC # BLD AUTO: 5 10E12/L (ref 4.4–5.9)
SODIUM SERPL-SCNC: 138 MMOL/L (ref 133–144)
TRIGL SERPL-MCNC: 246 MG/DL
WBC # BLD AUTO: 7 10E9/L (ref 4–11)

## 2017-10-31 PROCEDURE — 85652 RBC SED RATE AUTOMATED: CPT | Performed by: INTERNAL MEDICINE

## 2017-10-31 PROCEDURE — 80061 LIPID PANEL: CPT | Performed by: INTERNAL MEDICINE

## 2017-10-31 PROCEDURE — 86140 C-REACTIVE PROTEIN: CPT | Performed by: INTERNAL MEDICINE

## 2017-10-31 PROCEDURE — 36415 COLL VENOUS BLD VENIPUNCTURE: CPT | Performed by: INTERNAL MEDICINE

## 2017-10-31 PROCEDURE — 80053 COMPREHEN METABOLIC PANEL: CPT | Performed by: INTERNAL MEDICINE

## 2017-10-31 PROCEDURE — 85025 COMPLETE CBC W/AUTO DIFF WBC: CPT | Performed by: INTERNAL MEDICINE

## 2017-10-31 PROCEDURE — 83036 HEMOGLOBIN GLYCOSYLATED A1C: CPT | Performed by: INTERNAL MEDICINE

## 2017-10-31 PROCEDURE — 99214 OFFICE O/P EST MOD 30 MIN: CPT | Performed by: INTERNAL MEDICINE

## 2017-10-31 NOTE — NURSING NOTE
"Chief Complaint   Patient presents with     Bowel Problems     Dark stools x3 days        Initial BP (!) 154/92  Pulse 69  Temp 97.9  F (36.6  C) (Oral)  Ht 5' 4.75\" (1.645 m)  Wt 176 lb (79.8 kg)  SpO2 100%  BMI 29.51 kg/m2 Estimated body mass index is 29.51 kg/(m^2) as calculated from the following:    Height as of this encounter: 5' 4.75\" (1.645 m).    Weight as of this encounter: 176 lb (79.8 kg).  Medication Reconciliation: complete     Bushra Hendricks MA       "

## 2017-10-31 NOTE — MR AVS SNAPSHOT
After Visit Summary   10/31/2017    Chi Newman    MRN: [id number]           Patient Information     Date Of Birth          1945        Visit Information        Provider Department      10/31/2017 11:30 AM Isaac Knutson MD [facility]        Today's Diagnoses     Blurred vision    -  1    Episodic tension-type headache, not intractable        Black stools        Elevated glucose        History of diverticulitis of colon        History of partial colectomy        Hyperlipidemia with target LDL less than 130        Elevated liver enzymes        Elevated blood pressure reading without diagnosis of hypertension          Care Instructions    - I will follow up with you about lab results.     [facility]    If you have any questions regarding to your visit please contact your care team:     Team Pink:   Clinic Hours Telephone Number   Internal Medicine:  Dr. Tammy Hernandez NP       7am-7pm  Monday - Thursday   7am-5pm  Fridays  [phone]  (Appointment scheduling available 24/7)    Questions about your visit?  Team Line  [phone]   Urgent Care - [place] and [place] - 11am-9pm Monday-Friday Saturday-Sunday- 9am-5pm   [place] - 5pm-9pm Monday-Friday Saturday-Sunday- 9am-5pm  [phone] - [place] Rosa   [phone] - [place]       What options do I have for visits at the clinic other than the traditional office visit?  To expand how we care for you, many of our providers are utilizing electronic visits (e-visits) and telephone visits, when medically appropriate, for interactions with their patients rather than a visit in the clinic.   We also offer nurse visits for many medical concerns. Just like any other service, we will bill your insurance company for this type of visit based on time spent on the phone with your provider. Not all insurance companies cover these visits. Please check with your medical  insurance if this type of visit is covered. You will be responsible for any charges that are not paid by your insurance.      E-visits via [employer]:  generally incur a $35.00 fee.  Telephone visits:  Time spent on the phone: *charged based on time that is spent on the phone in increments of 10 minutes. Estimated cost:   5-10 mins $30.00   11-20 mins. $59.00   21-30 mins. $85.00   Use [employer] (secure email communication and access to your chart) to send your primary care provider a message or make an appointment. Ask someone on your Team how to sign up for [facility].    For a Price Quote for your services, please call our OwnLocal Line at [phone].    As always, Thank you for trusting us with your health care needs!    Discharged By:  LADY SCHAFFER            Follow-ups after your visit        Your next 10 appointments already scheduled     Nov 14, 2017 10:30 AM CST   New Visit with Connie Martinez MD   [facility] ([address] [address] [phone]              Who to contact     If you have questions or need follow up information about today's clinic visit or your schedule please contact [facility] directly at [phone].  Normal or non-critical lab and imaging results will be communicated to you by Spontohart, letter or phone within 4 business days after the clinic has received the results. If you do not hear from us within 7 days, please contact the clinic through MyChart or phone. If you have a critical or abnormal lab result, we will notify you by phone as soon as possible.  Submit refill requests through [facility] or call your pharmacy and they will forward the refill request to us. Please allow 3 business days for your refill to be completed.          Additional Information About Your Visit        [facility] Information     [facility] gives you secure access to your electronic health record. If you see a primary care provider, you can  "also send messages to your care team and make appointments. If you have questions, please call your primary care clinic.  If you do not have a primary care provider, please call [phone] and they will assist you.        Care EveryWhere ID     This is your Care EveryWhere ID. This could be used by other organizations to access your [place] medical records  [id number]        Your Vitals Were     Pulse Temperature Height Pulse Oximetry BMI (Body Mass Index)       69 97.9  F (36.6  C) (Oral) 5' 4.75\" (1.645 m) 100% 29.51 kg/m2        Blood Pressure from Last 3 Encounters:   10/31/17 (!) 154/92   04/18/17 128/66   02/09/17 124/70    Weight from Last 3 Encounters:   10/31/17 176 lb (79.8 kg)   04/18/17 179 lb (81.2 kg)   02/09/17 175 lb (79.4 kg)              We Performed the Following     CBC with platelets differential     Comprehensive metabolic panel     CRP inflammation     Erythrocyte sedimentation rate auto     Hemoglobin A1c     Lipid panel reflex to direct LDL Fasting        Primary Care Provider Office Phone # Fax #    Isaac Knutson [phone]        [facility] [address]        Equal Access to Services     LULU CALERO : Hadii jose ku hadasho Soomaali, waaxda luqadaha, qaybta kaalmada adeegyada, colin barton. So [facility] [phone].    ATENCIÓN: Si habla español, tiene a gilliam disposición servicios gratuitos de asistencia lingüística. Llame al [phone].    We comply with applicable federal civil rights laws and [place] laws. We do not discriminate on the basis of race, color, national origin, age, disability, sex, sexual orientation, or gender identity.            Thank you!     Thank you for choosing [facility]  for your care. Our goal is always to provide you with excellent care. Hearing back from our patients is one way we can continue to improve our services. Please take a few minutes to complete the written survey that " you may receive in the mail after your visit with us. Thank you!             Your Updated Medication List - Protect others around you: Learn how to safely use, store and throw away your medicines at www.disposemymeds.org.          This list is accurate as of: 10/31/17 12:13 PM.  Always use your most recent med list.                   Brand Name Dispense Instructions for use Diagnosis    acetaminophen 500 MG tablet    TYLENOL     Take 500-1,000 mg by mouth every 6 hours as needed        fexofenadine 180 MG tablet    ALLEGRA    90 tablet    Take 1 tablet (180 mg) by mouth daily Take 180 mg by mouth once daily. Indications: SEASONAL ALLERGIC RHINITIS    Environmental allergies, Seasonal allergic rhinitis       fluticasone 50 MCG/ACT spray    FLONASE    16 g    Spray 1-2 sprays into both nostrils daily    Environmental allergies, Seasonal allergic rhinitis       ibuprofen 200 MG tablet    ADVIL/MOTRIN     Take 200 mg by mouth every 4 hours as needed        pravastatin 20 MG tablet    PRAVACHOL    36 tablet    Take 1 tablet (20 mg) by mouth three times a week    Hyperlipidemia with target LDL less than 130       SYSTANE BALANCE OP      Apply 1 drop to eye 4 times daily

## 2017-10-31 NOTE — PATIENT INSTRUCTIONS
- I will follow up with you about lab results.     [facility]    If you have any questions regarding to your visit please contact your care team:     Team Pink:   Clinic Hours Telephone Number   Internal Medicine:  Dr. Tammy Hernandez, NP       7am-7pm  Monday - Thursday   7am-5pm  Fridays  [phone]  (Appointment scheduling available 24/7)    Questions about your visit?  Team Line  [phone]   Urgent Care - [place] and [facility] - 11am-9pm Monday-Friday Saturday-Sunday- 9am-5pm   [place] - 5pm-9pm Monday-Friday Saturday-Sunday- 9am-5pm  [phone] - [place]   [phone] - [place]       What options do I have for visits at the clinic other than the traditional office visit?  To expand how we care for you, many of our providers are utilizing electronic visits (e-visits) and telephone visits, when medically appropriate, for interactions with their patients rather than a visit in the clinic.   We also offer nurse visits for many medical concerns. Just like any other service, we will bill your insurance company for this type of visit based on time spent on the phone with your provider. Not all insurance companies cover these visits. Please check with your medical insurance if this type of visit is covered. You will be responsible for any charges that are not paid by your insurance.      E-visits via [employer]:  generally incur a $35.00 fee.  Telephone visits:  Time spent on the phone: *charged based on time that is spent on the phone in increments of 10 minutes. Estimated cost:   5-10 mins $30.00   11-20 mins. $59.00   21-30 mins. $85.00   Use [employer] (secure email communication and access to your chart) to send your primary care provider a message or make an appointment. Ask someone on your Team how to sign up for [employer].    For a Price Quote for your services, please call our Consumer Price Line at [phone].    As always, Thank you  for trusting us with your health care needs!    Discharged By:  LADY SCHAFFER

## 2017-11-03 ENCOUNTER — TELEPHONE (OUTPATIENT)
Dept: INTERNAL MEDICINE | Facility: CLINIC | Age: 72
End: 2017-11-03

## 2017-11-03 NOTE — TELEPHONE ENCOUNTER
Patient notified of Provider's message as written.  Patient verbalized understanding.    Please fax referral to [facility]  Then close encounter.  No need to call patient back      Jl Beckford RN

## 2017-11-03 NOTE — TELEPHONE ENCOUNTER
Chart note addendum ;     All of this patients laboratory studies came back unremarkable . Not exactly sure what more to do. Suggested gastroenterological consultation / follow up / second opinion and patient is eager for this. See orders section of this encounter , referral orders placed.     Isaac Knutson MD      Comments   Preferred Location: [facility] [phone]

## 2017-11-14 ENCOUNTER — OFFICE VISIT (OUTPATIENT)
Dept: OPHTHALMOLOGY | Facility: CLINIC | Age: 72
End: 2017-11-14
Payer: COMMERCIAL

## 2017-11-14 DIAGNOSIS — H52.201 ASTIGMATISM OF RIGHT EYE, UNSPECIFIED TYPE: ICD-10-CM

## 2017-11-14 DIAGNOSIS — H52.4 PRESBYOPIA: ICD-10-CM

## 2017-11-14 DIAGNOSIS — M54.81 OCCIPITAL NEURALGIA OF RIGHT SIDE: ICD-10-CM

## 2017-11-14 DIAGNOSIS — H43.813 PVD (POSTERIOR VITREOUS DETACHMENT), BOTH EYES: ICD-10-CM

## 2017-11-14 DIAGNOSIS — H02.883 MEIBOMIAN GLAND DYSFUNCTION (MGD) OF BOTH EYES: ICD-10-CM

## 2017-11-14 DIAGNOSIS — H25.13 SENILE NUCLEAR SCLEROSIS, BILATERAL: ICD-10-CM

## 2017-11-14 DIAGNOSIS — H02.886 MEIBOMIAN GLAND DYSFUNCTION (MGD) OF BOTH EYES: ICD-10-CM

## 2017-11-14 DIAGNOSIS — H52.13 MYOPIA OF BOTH EYES: ICD-10-CM

## 2017-11-14 DIAGNOSIS — Z01.01 ENCOUNTER FOR EXAMINATION OF EYES AND VISION WITH ABNORMAL FINDINGS: Primary | ICD-10-CM

## 2017-11-14 DIAGNOSIS — H04.121 DRY EYE OF RIGHT SIDE: ICD-10-CM

## 2017-11-14 DIAGNOSIS — H35.3190 NONEXUDATIVE SENILE MACULAR DEGENERATION OF RETINA: ICD-10-CM

## 2017-11-14 PROCEDURE — 92015 DETERMINE REFRACTIVE STATE: CPT | Performed by: STUDENT IN AN ORGANIZED HEALTH CARE EDUCATION/TRAINING PROGRAM

## 2017-11-14 PROCEDURE — 92014 COMPRE OPH EXAM EST PT 1/>: CPT | Performed by: STUDENT IN AN ORGANIZED HEALTH CARE EDUCATION/TRAINING PROGRAM

## 2017-11-14 ASSESSMENT — CUP TO DISC RATIO
OS_RATIO: 0.5
OD_RATIO: 0.5

## 2017-11-14 ASSESSMENT — VISUAL ACUITY
METHOD: SNELLEN - LINEAR
OS_CC: 2
OD_CC: 20/20
OD_CC: 2
CORRECTION_TYPE: GLASSES
OS_CC: 20/20
OD_CC+: -3

## 2017-11-14 ASSESSMENT — TONOMETRY
OD_IOP_MMHG: 19
OS_IOP_MMHG: 18
IOP_METHOD: APPLANATION

## 2017-11-14 ASSESSMENT — REFRACTION_WEARINGRX
OD_CYLINDER: +1.00
OS_ADD: +2.50
OD_SPHERE: -1.75
OD_ADD: +2.50
OS_CYLINDER: BALANCE
OD_AXIS: 016
SPECS_TYPE: PAL
OS_SPHERE: -1.75

## 2017-11-14 ASSESSMENT — EXTERNAL EXAM - LEFT EYE: OS_EXAM: NORMAL

## 2017-11-14 ASSESSMENT — CONF VISUAL FIELD
OS_NORMAL: 1
OD_NORMAL: 1

## 2017-11-14 ASSESSMENT — SLIT LAMP EXAM - LIDS
COMMENTS: 1+, MEIBOMIAN GLAND DYSFUNCTION
COMMENTS: 1+, MEIBOMIAN GLAND DYSFUNCTION

## 2017-11-14 ASSESSMENT — REFRACTION_MANIFEST
OS_SPHERE: -1.75
OD_SPHERE: -1.75
OD_ADD: +2.50
OS_CYLINDER: SPHERE
OS_ADD: +2.50
OD_AXIS: 018
OD_CYLINDER: +1.00

## 2017-11-14 ASSESSMENT — EXTERNAL EXAM - RIGHT EYE: OD_EXAM: NORMAL

## 2017-11-14 NOTE — MR AVS SNAPSHOT
"              After Visit Summary   11/14/2017    Chi Newman    MRN: [id number]           Patient Information     Date Of Birth          1945        Visit Information        Provider Department      11/14/2017 10:30 AM Connie Martinez MD [facility]        Today's Diagnoses     Encounter for examination of eyes and vision with abnormal findings    -  1    Presbyopia        Myopia of both eyes        Astigmatism of right eye, unspecified type        Senile nuclear sclerosis, bilateral        PVD (posterior vitreous detachment), both eyes        Nonexudative senile macular degeneration of retina        Occipital neuralgia of right side - resolved        Vitreous syneresis, bilateral        Macular drusen, bilateral        Meibomian gland dysfunction (MGD) of both eyes        Dry eye of right side          Care Instructions    Glasses prescription given - optional   Use artificial tears up to 4 times per day (Refresh Plus, Systane Balance, or generic artificial tears are ok. Avoid \"get the red out\" drops).   May try Celluvisc or Genteal gel drops    Connie Martinez MD  [phone]            Follow-ups after your visit        Follow-up notes from your care team     Return in about 1 year (around 11/14/2018) for Complete Exam.      Your next 10 appointments already scheduled     Nov 30, 2017  1:50 PM CST   Office Visit with Isaac Knutson MD   [facility] [place] ([facility] [place])    [address]  [place] [address] [phone]           Bring a current list of meds and any records pertaining to this visit. For Physicals, please bring immunization records and any forms needing to be filled out. Please arrive 10 minutes early to complete paperwork.              Who to contact     If you have questions or need follow up information about today's clinic visit or your schedule please contact [facility] directly at [phone].  Normal or non-critical " lab and imaging results will be communicated to you by MyChart, letter or phone within 4 business days after the clinic has received the results. If you do not hear from us within 7 days, please contact the clinic through [employer] or phone. If you have a critical or abnormal lab result, we will notify you by phone as soon as possible.  Submit refill requests through [employer] or call your pharmacy and they will forward the refill request to us. Please allow 3 business days for your refill to be completed.          Additional Information About Your Visit        [employer] Information     [employer] gives you secure access to your electronic health record. If you see a primary care provider, you can also send messages to your care team and make appointments. If you have questions, please call your primary care clinic.  If you do not have a primary care provider, please call [phone] and they will assist you.        Care EveryWhere ID     This is your Care EveryWhere ID. This could be used by other organizations to access your [place] medical records  [id number]         Blood Pressure from Last 3 Encounters:   10/31/17 144/80   04/18/17 128/66   02/09/17 124/70    Weight from Last 3 Encounters:   10/31/17 79.8 kg (176 lb)   04/18/17 81.2 kg (179 lb)   02/09/17 79.4 kg (175 lb)              We Performed the Following     EYE EXAM (SIMPLE-NONBILLABLE)     REFRACTIVE STATUS        Primary Care Provider Office Phone # Fax #    Isaac Knutson [phone]       [address] [facility] [address] [facility] [address]        Equal Access to Services     [facility] Center[facility] AH: Hadii aad ku hadasho Soomaali, waaxda luqadaha, qaybta kaalmada adeegyada, colin barton. So [facility] [phone].    ATENCIÓN: Si habla español, tiene a gilliam disposición servicios gratuitos de asistencia lingüística. Llame al [phone].    We comply with applicable federal civil rights laws and [place] laws. We do not discriminate on  the basis of race, color, national origin, age, disability, sex, sexual orientation, or gender identity.            Thank you!     Thank you for choosing [facility]  for your care. Our goal is always to provide you with excellent care. Hearing back from our patients is one way we can continue to improve our services. Please take a few minutes to complete the written survey that you may receive in the mail after your visit with us. Thank you!             Your Updated Medication List - Protect others around you: Learn how to safely use, store and throw away your medicines at www.disposemymeds.org.          This list is accurate as of: 11/14/17 11:38 AM.  Always use your most recent med list.                   Brand Name Dispense Instructions for use Diagnosis    acetaminophen 500 MG tablet    TYLENOL     Take 500-1,000 mg by mouth every 6 hours as needed        fexofenadine 180 MG tablet    ALLEGRA    90 tablet    Take 1 tablet (180 mg) by mouth daily Take 180 mg by mouth once daily. Indications: SEASONAL ALLERGIC RHINITIS    Environmental allergies, Seasonal allergic rhinitis       fluticasone 50 MCG/ACT spray    FLONASE    16 g    Spray 1-2 sprays into both nostrils daily    Environmental allergies, Seasonal allergic rhinitis       ibuprofen 200 MG tablet    ADVIL/MOTRIN     Take 200 mg by mouth every 4 hours as needed        pravastatin 20 MG tablet    PRAVACHOL    36 tablet    Take 1 tablet (20 mg) by mouth three times a week    Hyperlipidemia with target LDL less than 130       SYSTANE BALANCE OP      Apply 1 drop to eye 4 times daily

## 2017-11-14 NOTE — PROGRESS NOTES
" Current Eye Medications:  systane both eyes three times a day or more.     Subjective:  Comprehensive Eye Exam.  Tech note: Patient states he is here for his annual exam, even though it is one month early - he states it is ok to to proceed with his Annual Eye Exam (he asks me to look at his record and his last exam and requires me to recite to him his previous vision complaints from his last exam \"to familiarize me with his eye history.\")  Patient complains of episodes of blurred vision in his right eye along with intermittent throbbing pain in his right eye.  He also has a twitching-type pain around his right temple area.  He feels most of these issues are related to his previous Posterior Vitreous Detachment.  These episodes last about one hour and are relieved by rest.  He is very concerned about these symptoms and how they relate to his overall general health.  He feels these episodes are most common in the fall and winter months.   No problems or changes in his left eye.       Dr. Knutson ordered ESR, CRP, platelets on 10/31/17 that were all normal.     Objective:  See Ophthalmology Exam.      Assessment:  Chi Newman is a 72 year old male who presents with:     Senile nuclear sclerosis, bilateral Not visually significant      PVD (posterior vitreous detachment), both eyes      Nonexudative senile macular degeneration of retina Very mild both eyes      Meibomian gland dysfunction (MGD) of both eyes      Dry eye of right side Discussed gel tears or possible punctal plug for the right lower lid.       Plan:  Glasses prescription given - optional   Use artificial tears up to 4 times per day (Refresh Plus, Systane Balance, or generic artificial tears are ok. Avoid \"get the red out\" drops).   May try Celluvisc or Genteal gel drops  Warm compresses twice a day as needed     Connie Martinez MD  [phone]           "

## 2017-11-14 NOTE — PATIENT INSTRUCTIONS
"Glasses prescription given - optional   Use artificial tears up to 4 times per day (Refresh Plus, Systane Balance, or generic artificial tears are ok. Avoid \"get the red out\" drops).   May try Celluvisc or Genteal gel drops  Warm compresses twice a day as needed     Connie Martinez MD  [phone]    "

## 2017-12-13 ENCOUNTER — TRANSFERRED RECORDS (OUTPATIENT)
Dept: HEALTH INFORMATION MANAGEMENT | Facility: CLINIC | Age: 72
End: 2017-12-13

## 2017-12-21 ENCOUNTER — TRANSFERRED RECORDS (OUTPATIENT)
Dept: HEALTH INFORMATION MANAGEMENT | Facility: CLINIC | Age: 72
End: 2017-12-21

## 2017-12-26 ENCOUNTER — MYC MEDICAL ADVICE (OUTPATIENT)
Dept: INTERNAL MEDICINE | Facility: CLINIC | Age: 72
End: 2017-12-26

## 2017-12-26 DIAGNOSIS — N40.0 BENIGN PROSTATIC HYPERPLASIA, UNSPECIFIED WHETHER LOWER URINARY TRACT SYMPTOMS PRESENT: Primary | ICD-10-CM

## 2017-12-28 ENCOUNTER — MYC MEDICAL ADVICE (OUTPATIENT)
Dept: INTERNAL MEDICINE | Facility: CLINIC | Age: 72
End: 2017-12-28

## 2018-01-23 ENCOUNTER — TRANSFERRED RECORDS (OUTPATIENT)
Dept: HEALTH INFORMATION MANAGEMENT | Facility: CLINIC | Age: 73
End: 2018-01-23

## 2018-04-10 ENCOUNTER — MYC MEDICAL ADVICE (OUTPATIENT)
Dept: FAMILY MEDICINE | Facility: CLINIC | Age: 73
End: 2018-04-10

## 2018-04-24 ENCOUNTER — TRANSFERRED RECORDS (OUTPATIENT)
Dept: HEALTH INFORMATION MANAGEMENT | Facility: CLINIC | Age: 73
End: 2018-04-24

## 2018-04-25 ENCOUNTER — MYC MEDICAL ADVICE (OUTPATIENT)
Dept: FAMILY MEDICINE | Facility: CLINIC | Age: 73
End: 2018-04-25

## 2018-04-26 ENCOUNTER — MYC MEDICAL ADVICE (OUTPATIENT)
Dept: FAMILY MEDICINE | Facility: CLINIC | Age: 73
End: 2018-04-26

## 2018-05-09 DIAGNOSIS — Z91.09 ENVIRONMENTAL ALLERGIES: ICD-10-CM

## 2018-05-10 ENCOUNTER — MYC MEDICAL ADVICE (OUTPATIENT)
Dept: FAMILY MEDICINE | Facility: CLINIC | Age: 73
End: 2018-05-10

## 2018-05-10 DIAGNOSIS — Z91.09 ENVIRONMENTAL ALLERGIES: ICD-10-CM

## 2018-05-10 DIAGNOSIS — J30.2 SEASONAL ALLERGIC RHINITIS: ICD-10-CM

## 2018-05-10 RX ORDER — FLUTICASONE PROPIONATE 50 MCG
SPRAY, SUSPENSION (ML) NASAL
Qty: 16 G | Refills: 9 | Status: SHIPPED | OUTPATIENT
Start: 2018-05-10 | End: 2019-03-05

## 2018-05-10 RX ORDER — FLUTICASONE PROPIONATE 50 MCG
1-2 SPRAY, SUSPENSION (ML) NASAL DAILY
Qty: 16 G | Refills: 9 | Status: CANCELLED | OUTPATIENT
Start: 2018-05-10

## 2018-05-10 NOTE — TELEPHONE ENCOUNTER
Pending Prescriptions:                       Disp   Refills    fluticasone (FLONASE) 50 MCG/ACT spray [P*16 g   9            Sig: USE 1 OR 2 SPRAYS IN EACH NOSTRIL DAILY    Routing refill request to provider for review/approval because:  Drug not on the [facility] refill protocol       Thelma Davila RN

## 2018-05-10 NOTE — TELEPHONE ENCOUNTER
Pending Prescriptions:                       Disp   Refills    fluticasone (FLONASE) 50 MCG/ACT spray    16 g   9            Sig: Spray 1-2 sprays into both nostrils daily    Routing refill request to provider for review/approval because:  Drug not on the [facility] refill protocol     Thelma Davila RN

## 2018-05-29 ENCOUNTER — MYC MEDICAL ADVICE (OUTPATIENT)
Dept: FAMILY MEDICINE | Facility: CLINIC | Age: 73
End: 2018-05-29

## 2018-05-31 ENCOUNTER — MYC MEDICAL ADVICE (OUTPATIENT)
Dept: FAMILY MEDICINE | Facility: CLINIC | Age: 73
End: 2018-05-31

## 2018-06-14 ENCOUNTER — MYC MEDICAL ADVICE (OUTPATIENT)
Dept: FAMILY MEDICINE | Facility: CLINIC | Age: 73
End: 2018-06-14

## 2018-11-27 ENCOUNTER — MYC MEDICAL ADVICE (OUTPATIENT)
Dept: FAMILY MEDICINE | Facility: CLINIC | Age: 73
End: 2018-11-27

## 2018-11-27 ASSESSMENT — ENCOUNTER SYMPTOMS
NAUSEA: 0
COUGH: 0
SHORTNESS OF BREATH: 0
WEAKNESS: 1
PALPITATIONS: 0
JOINT SWELLING: 0
DIZZINESS: 0
HEADACHES: 1
CONSTIPATION: 1
PARESTHESIAS: 1
SORE THROAT: 0
DIARRHEA: 1
FEVER: 1
ABDOMINAL PAIN: 1
FREQUENCY: 0
EYE PAIN: 1
ARTHRALGIAS: 1
DYSURIA: 0
MYALGIAS: 1
HEARTBURN: 1
NERVOUS/ANXIOUS: 1

## 2018-11-27 ASSESSMENT — ACTIVITIES OF DAILY LIVING (ADL): CURRENT_FUNCTION: NO ASSISTANCE NEEDED

## 2018-11-29 ENCOUNTER — OFFICE VISIT (OUTPATIENT)
Dept: FAMILY MEDICINE | Facility: CLINIC | Age: 73
End: 2018-11-29
Payer: COMMERCIAL

## 2018-11-29 VITALS
OXYGEN SATURATION: 100 % | BODY MASS INDEX: 29.66 KG/M2 | RESPIRATION RATE: 18 BRPM | HEIGHT: 65 IN | DIASTOLIC BLOOD PRESSURE: 70 MMHG | SYSTOLIC BLOOD PRESSURE: 132 MMHG | WEIGHT: 178 LBS | TEMPERATURE: 96.8 F | HEART RATE: 77 BPM

## 2018-11-29 DIAGNOSIS — H61.22 IMPACTED CERUMEN OF LEFT EAR: ICD-10-CM

## 2018-11-29 DIAGNOSIS — E55.9 HYPOVITAMINOSIS D: ICD-10-CM

## 2018-11-29 DIAGNOSIS — Z23 NEED FOR TDAP VACCINATION: ICD-10-CM

## 2018-11-29 DIAGNOSIS — Z23 NEED FOR PROPHYLACTIC VACCINATION AND INOCULATION AGAINST INFLUENZA: ICD-10-CM

## 2018-11-29 DIAGNOSIS — L30.9 ECZEMA, UNSPECIFIED TYPE: ICD-10-CM

## 2018-11-29 DIAGNOSIS — E78.5 HYPERLIPIDEMIA WITH TARGET LDL LESS THAN 130: ICD-10-CM

## 2018-11-29 DIAGNOSIS — R73.09 ELEVATED GLUCOSE: ICD-10-CM

## 2018-11-29 DIAGNOSIS — Z00.00 ROUTINE HISTORY AND PHYSICAL EXAMINATION OF ADULT: Primary | ICD-10-CM

## 2018-11-29 DIAGNOSIS — Z23 NEED FOR SHINGLES VACCINE: ICD-10-CM

## 2018-11-29 DIAGNOSIS — M19.90 ARTHRITIS: ICD-10-CM

## 2018-11-29 LAB
ANION GAP SERPL CALCULATED.3IONS-SCNC: 8 MMOL/L (ref 3–14)
BASOPHILS # BLD AUTO: 0.1 10E9/L (ref 0–0.2)
BASOPHILS NFR BLD AUTO: 0.8 %
BUN SERPL-MCNC: 16 MG/DL (ref 7–30)
CALCIUM SERPL-MCNC: 8.8 MG/DL (ref 8.5–10.1)
CHLORIDE SERPL-SCNC: 105 MMOL/L (ref 94–109)
CHOLEST SERPL-MCNC: 214 MG/DL
CO2 SERPL-SCNC: 26 MMOL/L (ref 20–32)
CREAT SERPL-MCNC: 0.82 MG/DL (ref 0.66–1.25)
CRP SERPL-MCNC: 4.8 MG/L (ref 0–8)
DIFFERENTIAL METHOD BLD: NORMAL
EOSINOPHIL # BLD AUTO: 0.3 10E9/L (ref 0–0.7)
EOSINOPHIL NFR BLD AUTO: 4.2 %
ERYTHROCYTE [DISTWIDTH] IN BLOOD BY AUTOMATED COUNT: 14.3 % (ref 10–15)
ERYTHROCYTE [SEDIMENTATION RATE] IN BLOOD BY WESTERGREN METHOD: 19 MM/H (ref 0–20)
GFR SERPL CREATININE-BSD FRML MDRD: >90 ML/MIN/1.7M2
GLUCOSE SERPL-MCNC: 94 MG/DL (ref 70–99)
HBA1C MFR BLD: 5.7 % (ref 0–5.6)
HCT VFR BLD AUTO: 40.4 % (ref 40–53)
HDLC SERPL-MCNC: 32 MG/DL
HGB BLD-MCNC: 13.7 G/DL (ref 13.3–17.7)
LDLC SERPL CALC-MCNC: ABNORMAL MG/DL
LDLC SERPL DIRECT ASSAY-MCNC: 126 MG/DL
LYMPHOCYTES # BLD AUTO: 1.4 10E9/L (ref 0.8–5.3)
LYMPHOCYTES NFR BLD AUTO: 19.1 %
MCH RBC QN AUTO: 29 PG (ref 26.5–33)
MCHC RBC AUTO-ENTMCNC: 33.9 G/DL (ref 31.5–36.5)
MCV RBC AUTO: 86 FL (ref 78–100)
MONOCYTES # BLD AUTO: 0.8 10E9/L (ref 0–1.3)
MONOCYTES NFR BLD AUTO: 11.3 %
NEUTROPHILS # BLD AUTO: 4.8 10E9/L (ref 1.6–8.3)
NEUTROPHILS NFR BLD AUTO: 64.6 %
NONHDLC SERPL-MCNC: 182 MG/DL
PLATELET # BLD AUTO: 317 10E9/L (ref 150–450)
POTASSIUM SERPL-SCNC: 3.8 MMOL/L (ref 3.4–5.3)
RBC # BLD AUTO: 4.72 10E12/L (ref 4.4–5.9)
SODIUM SERPL-SCNC: 139 MMOL/L (ref 133–144)
TRIGL SERPL-MCNC: 481 MG/DL
WBC # BLD AUTO: 7.4 10E9/L (ref 4–11)

## 2018-11-29 PROCEDURE — 90471 IMMUNIZATION ADMIN: CPT | Performed by: INTERNAL MEDICINE

## 2018-11-29 PROCEDURE — G0438 PPPS, INITIAL VISIT: HCPCS | Performed by: INTERNAL MEDICINE

## 2018-11-29 PROCEDURE — 86140 C-REACTIVE PROTEIN: CPT | Performed by: INTERNAL MEDICINE

## 2018-11-29 PROCEDURE — 80048 BASIC METABOLIC PNL TOTAL CA: CPT | Performed by: INTERNAL MEDICINE

## 2018-11-29 PROCEDURE — 86431 RHEUMATOID FACTOR QUANT: CPT | Performed by: INTERNAL MEDICINE

## 2018-11-29 PROCEDURE — 83721 ASSAY OF BLOOD LIPOPROTEIN: CPT | Mod: 59 | Performed by: INTERNAL MEDICINE

## 2018-11-29 PROCEDURE — 85652 RBC SED RATE AUTOMATED: CPT | Performed by: INTERNAL MEDICINE

## 2018-11-29 PROCEDURE — 83036 HEMOGLOBIN GLYCOSYLATED A1C: CPT | Performed by: INTERNAL MEDICINE

## 2018-11-29 PROCEDURE — 69209 REMOVE IMPACTED EAR WAX UNI: CPT | Performed by: INTERNAL MEDICINE

## 2018-11-29 PROCEDURE — 86038 ANTINUCLEAR ANTIBODIES: CPT | Performed by: INTERNAL MEDICINE

## 2018-11-29 PROCEDURE — 80061 LIPID PANEL: CPT | Performed by: INTERNAL MEDICINE

## 2018-11-29 PROCEDURE — 99213 OFFICE O/P EST LOW 20 MIN: CPT | Mod: 25 | Performed by: INTERNAL MEDICINE

## 2018-11-29 PROCEDURE — 36415 COLL VENOUS BLD VENIPUNCTURE: CPT | Performed by: INTERNAL MEDICINE

## 2018-11-29 PROCEDURE — 90715 TDAP VACCINE 7 YRS/> IM: CPT | Performed by: INTERNAL MEDICINE

## 2018-11-29 PROCEDURE — 85025 COMPLETE CBC W/AUTO DIFF WBC: CPT | Performed by: INTERNAL MEDICINE

## 2018-11-29 PROCEDURE — 90686 IIV4 VACC NO PRSV 0.5 ML IM: CPT | Performed by: INTERNAL MEDICINE

## 2018-11-29 PROCEDURE — 82306 VITAMIN D 25 HYDROXY: CPT | Performed by: INTERNAL MEDICINE

## 2018-11-29 PROCEDURE — G0008 ADMIN INFLUENZA VIRUS VAC: HCPCS | Mod: 59 | Performed by: INTERNAL MEDICINE

## 2018-11-29 RX ORDER — TRIAMCINOLONE ACETONIDE 1 MG/G
OINTMENT TOPICAL
Qty: 30 G | Refills: 3 | Status: SHIPPED | OUTPATIENT
Start: 2018-11-29 | End: 2020-05-05

## 2018-11-29 ASSESSMENT — ENCOUNTER SYMPTOMS
NERVOUS/ANXIOUS: 1
PARESTHESIAS: 1
HEADACHES: 1
DYSURIA: 0
SORE THROAT: 0
COUGH: 0
HEARTBURN: 1
SHORTNESS OF BREATH: 0
WEAKNESS: 1
MYALGIAS: 1
CONSTIPATION: 1
NAUSEA: 0
PALPITATIONS: 0
ABDOMINAL PAIN: 1
JOINT SWELLING: 0
DIZZINESS: 0
FEVER: 1
EYE PAIN: 1
ARTHRALGIAS: 1
FREQUENCY: 0
DIARRHEA: 1

## 2018-11-29 ASSESSMENT — ACTIVITIES OF DAILY LIVING (ADL): CURRENT_FUNCTION: NO ASSISTANCE NEEDED

## 2018-11-29 NOTE — PATIENT INSTRUCTIONS
New shingrix vaccine discussed with patient today patient desires to return in 2-4 weeks for this vaccine.           [facility]    If you have any questions regarding to your visit please contact your care team:     Team Pink:   Clinic Hours Telephone Number   Internal Medicine:  Dr. Tammy Hernandez, NP 7am-7pm  Monday - Thursday   7am-5pm  Fridays  [phone]  (Appointment scheduling available 24/7)   Urgent Care - [place] [facility] - 11am-9pm Monday-Friday Saturday-Sunday- 9am-5pm   [place] - 5pm-9pm Monday-Friday Saturday-Sunday- 9am-5pm  [phone] - [place]  [phone] - [place]       What options do I have for a visit other than an office visit? We offer electronic visits (e-visits) and telephone visits, when medically appropriate.  Please check with your medical insurance to see if these types of visits are covered, as you will be responsible for any charges that are not paid by your insurance.      You can use [employer] (secure electronic communication) to access to your chart, send your primary care provider a message, or make an appointment. Ask a team member how to get started.     For a price quote for your services, please call our Consumer Price Line at [phone] or our Imaging Cost estimation line at [phone] (for imaging tests).  Cassandra SAAVEDRA CMA ([facility])

## 2018-11-29 NOTE — PROGRESS NOTES
"Last appointment 10/2017. Does he get care at [facility] at all ?  Complete physical exam today   Due for tetanus booster and prophylactic vaccination and inoculation against influenza , good candidate for ShingRx vaccination against herpes zoster   Vitamin D was low in 2013, not since  Otherwise routine screening issues   Answers for HPI/ROS submitted by the patient on 11/27/2018   Annual Exam:  In general, how would you rate your overall physical health?: good  Frequency of exercise:: 2-3 days/week  Do you usually eat at least 4 servings of fruit and vegetables a day, include whole grains & fiber, and avoid regularly eating high fat or \"junk\" foods? : Yes  Taking medications regularly:: Yes  Medication side effects:: Not applicable  Activities of Daily Living: no assistance needed  Home safety: no safety concerns identified  Hearing Impairment:: difficulty following a conversation in a noisy restaurant or crowded room, difficulty understanding soft or whispered speech  In the past 6 months, have you been bothered by leaking of urine?: No  abdominal pain: Yes  congestion: Yes  constipation: Yes  cough: No  diarrhea: Yes  dizziness: No  ear pain: Yes  eye pain: Yes  nervous/anxious: Yes  fever: Yes  frequency: No  genital sores: No  headaches: Yes  hearing loss: No  heartburn: Yes  arthralgias: Yes  joint swelling: No  peripheral edema: No  mood changes: Yes  myalgias: Yes  nausea: No  dysuria: No  palpitations: No  Skin sensation changes: Yes  sore throat: No  urgency: No  rash: Yes  shortness of breath: No  visual disturbance: Yes  weakness: Yes  impotence: No  penile discharge: Yes  In general, how would you rate your overall mental or emotional health?: good  Additional concerns today:: Yes  Duration of exercise:: 30-45 minutes    "

## 2018-11-29 NOTE — MR AVS SNAPSHOT
After Visit Summary   11/29/2018    Chi Newman    MRN: [id number]           Patient Information     Date Of Birth          1945        Visit Information        Provider Department      11/29/2018 2:10 PM Isaac Knutson MD [facility]        Today's Diagnoses     Routine history and physical examination of adult    -  1    Need for shingles vaccine        Hyperlipidemia with target LDL less than 130        Hypovitaminosis D        Elevated glucose        Need for Tdap vaccination        Eczema, unspecified type        Arthritis        Impacted cerumen of left ear          Care Instructions    New shingrix vaccine discussed with patient today patient desires to return in 2-4 weeks for this vaccine.           [facility]    If you have any questions regarding to your visit please contact your care team:     Team Pink:   Clinic Hours Telephone Number   Internal Medicine:  Dr. Tammy Hernandez, NP 7am-7pm  Monday - Thursday   7am-5pm  Fridays  [phone]  (Appointment scheduling available 24/7)   Urgent Care - [place] - 11am-9pm Monday-Friday Saturday-Sunday- 9am-5pm   [place] - 5pm-9pm Monday-Friday Saturday-Sunday- 9am-5pm  [phone] - [place]  [phone] - [place]       What options do I have for a visit other than an office visit? We offer electronic visits (e-visits) and telephone visits, when medically appropriate.  Please check with your medical insurance to see if these types of visits are covered, as you will be responsible for any charges that are not paid by your insurance.      You can use [employer] (secure electronic communication) to access to your chart, send your primary care provider a message, or make an appointment. Ask a team member how to get started.     For a price quote for your services, please call our Consumer Price Line at [phone] or our Imaging Cost estimation  "line at [phone] (for imaging tests).  Cassandra SAAVEDRA CMA ([facility])            Follow-ups after your visit        Your next 10 appointments already scheduled     Dec 05, 2018  2:30 PM CST   New Visit with Connie Martinez MD   [facility] [place] ([facility])    [address]  [place] [address] [phone]              Who to contact     If you have questions or need follow up information about today's clinic visit or your schedule please contact [facility] directly at [phone].  Normal or non-critical lab and imaging results will be communicated to you by [employer], letter or phone within 4 business days after the clinic has received the results. If you do not hear from us within 7 days, please contact the clinic through [employer] or phone. If you have a critical or abnormal lab result, we will notify you by phone as soon as possible.  Submit refill requests through [employer] or call your pharmacy and they will forward the refill request to us. Please allow 3 business days for your refill to be completed.          Additional Information About Your Visit        MyChart Information     [employer] gives you secure access to your electronic health record. If you see a primary care provider, you can also send messages to your care team and make appointments. If you have questions, please call your primary care clinic.  If you do not have a primary care provider, please call [phone] and they will assist you.        Care EveryWhere ID     This is your Care EveryWhere ID. This could be used by other organizations to access your [place] medical records  [id number]        Your Vitals Were     Pulse Temperature Respirations Height Pulse Oximetry BMI (Body Mass Index)    77 96.8  F (36  C) (Oral) 18 5' 5\" (1.651 m) 100% 29.62 kg/m2       Blood Pressure from Last 3 Encounters:   11/29/18 132/70   10/31/17 144/80   04/18/17 128/66    Weight from Last 3 Encounters:   11/29/18 178 " lb (80.7 kg)   10/31/17 176 lb (79.8 kg)   04/18/17 179 lb (81.2 kg)              We Performed the Following     Anti Nuclear Apolonia IgG by IFA with Reflex     Basic metabolic panel     CBC with platelets and differential     CRP inflammation     Erythrocyte sedimentation rate auto     Hemoglobin A1c     Lipid panel reflex to direct LDL Fasting     REMOVE IMPACTED CERUMEN     Rheumatoid factor     TDAP, IM (10 - 64 YRS) - Adacel     Vitamin D Deficiency          Today's Medication Changes          These changes are accurate as of 11/29/18  2:45 PM.  If you have any questions, ask your nurse or doctor.               Start taking these medicines.        Dose/Directions    triamcinolone 0.1 % external ointment   Commonly known as:  KENALOG   Used for:  Eczema, unspecified type   Started by:  Isaac Knutson MD        Apply sparingly to affected area 2 times a day until cleared   Quantity:  30 g   Refills:  3            Where to get your medicines      These medications were sent to [facility] [place] - [address]  [address], [place] [address]     Phone:  [phone]     triamcinolone 0.1 % external ointment                Primary Care Provider Office Phone # Fax #    Isaac Knutson [phone] [address] [address]        Equal Access to Services     LULU CALERO AH: Hadii jose ku hadasho Soomaali, waaxda luqadaha, qaybta kaalmada adeegyada, waxay idiin hayaan zohar khtristan barton. So [facility] [phone].    ATENCIÓN: Si habla español, tiene a gilliam disposición servicios gratuitos de asistencia lingüística. Llame al [phone].    We comply with applicable federal civil rights laws and [place] laws. We do not discriminate on the basis of race, color, national origin, age, disability, sex, sexual orientation, or gender identity.            Thank you!     Thank you for choosing [facility]  for your care. Our goal is  always to provide you with excellent care. Hearing back from our patients is one way we can continue to improve our services. Please take a few minutes to complete the written survey that you may receive in the mail after your visit with us. Thank you!             Your Updated Medication List - Protect others around you: Learn how to safely use, store and throw away your medicines at www.disposemymeds.org.          This list is accurate as of 11/29/18  2:45 PM.  Always use your most recent med list.                   Brand Name Dispense Instructions for use Diagnosis    acetaminophen 500 MG tablet    TYLENOL     Take 500-1,000 mg by mouth every 6 hours as needed        fexofenadine 180 MG tablet    ALLEGRA    90 tablet    Take 1 tablet (180 mg) by mouth daily Take 180 mg by mouth once daily. Indications: SEASONAL ALLERGIC RHINITIS    Environmental allergies, Seasonal allergic rhinitis       fluticasone 50 MCG/ACT nasal spray    FLONASE    16 g    USE 1 OR 2 SPRAYS IN EACH NOSTRIL DAILY    Environmental allergies       * hypromellose 0.3 % Gel ophthalmic gel    GENTEAL     Place 1 drop into both eyes every hour as needed for dry eyes        * hypromellose 0.3 % Soln ophthalmic solution    GENTEAL     1 drop every hour as needed for dry eyes        ibuprofen 200 MG tablet    ADVIL/MOTRIN     Take 200 mg by mouth every 4 hours as needed        pravastatin 20 MG tablet    PRAVACHOL    36 tablet    Take 1 tablet (20 mg) by mouth three times a week    Hyperlipidemia with target LDL less than 130       SYSTANE BALANCE OP      Apply 1 drop to eye 4 times daily        triamcinolone 0.1 % external ointment    KENALOG    30 g    Apply sparingly to affected area 2 times a day until cleared    Eczema, unspecified type       vitamin D3 1000 units Caps      Take 1,000 Units by mouth        ZANTAC PO           * Notice:  This list has 2 medication(s) that are the same as other medications prescribed for you. Read the directions  carefully, and ask your doctor or other care provider to review them with you.

## 2018-11-29 NOTE — PROGRESS NOTES
"SUBJECTIVE:   Chi Newman is a 73 year old male who presents for Preventive Visit.    Are you in the first 12 months of your Medicare coverage?  No    Annual Wellness Visit     In general, how would you rate your overall health?  Good    Frequency of exercise:  2-3 days/week    Duration of exercise:  30-45 minutes    Do you usually eat at least 4 servings of fruit and vegetables a day, include whole grains    & fiber and avoid regularly eating high fat or \"junk\" foods?  Yes    Taking medications regularly:  Yes    Medication side effects:  Not applicable    Ability to successfully perform activities of daily living:  No assistance needed    Home Safety:  No safety concerns identified    Hearing Impairment:  Difficulty following a conversation in a noisy restaurant or crowded room and difficulty understanding soft or whispered speech    In the past 6 months, have you been bothered by leaking of urine?  No    In general, how would you rate your overall mental or emotional health?  Good    PHQ-2 Total Score: 0    Additional concerns today:  Yes    Do you feel safe in your environment? Yes    Do you have a Health Care Directive? Yes: Patient states has Advance Directive and will bring in a copy to clinic.      Fall risk       Cognitive Screening   1) Repeat 3 items (Leader, Season, Table)    2) Clock draw: ABNORMAL   3) 3 item recall: Recalls 3 objects  Results: 3 items recalled: COGNITIVE IMPAIRMENT LESS LIKELY, with abnormal     Mini-CogTM Copyright ALEXUS Fischer. Licensed by the author for use in [facility] [employer]; reprinted with permission ([email or web address]). All rights reserved.      Do you have sleep apnea, excessive snoring or daytime drowsiness?: no  Last appointment 10/2017. Does he get care at [facility] at all ?  Complete physical exam today   Due for tetanus booster and prophylactic vaccination and inoculation against influenza , good candidate for ShingRx vaccination against herpes " "zoster   Vitamin D was low in 2013, not since  Otherwise routine screening issues   Answers for HPI/ROS submitted by the patient on 11/27/2018   Annual Exam:  In general, how would you rate your overall physical health?: good  Frequency of exercise:: 2-3 days/week  Do you usually eat at least 4 servings of fruit and vegetables a day, include whole grains & fiber, and avoid regularly eating high fat or \"junk\" foods? : Yes  Taking medications regularly:: Yes  Medication side effects:: Not applicable  Activities of Daily Living: no assistance needed  Home safety: no safety concerns identified  Hearing Impairment:: difficulty following a conversation in a noisy restaurant or crowded room, difficulty understanding soft or whispered speech  In the past 6 months, have you been bothered by leaking of urine?: No  abdominal pain: Yes  congestion: Yes  constipation: Yes  cough: No  diarrhea: Yes  dizziness: No  ear pain: Yes  eye pain: Yes  nervous/anxious: Yes  fever: Yes  frequency: No  genital sores: No  headaches: Yes  hearing loss: No  heartburn: Yes  arthralgias: Yes  joint swelling: No  peripheral edema: No  mood changes: Yes  myalgias: Yes  nausea: No  dysuria: No  palpitations: No  Skin sensation changes: Yes  sore throat: No  urgency: No  rash: Yes  shortness of breath: No  visual disturbance: Yes  weakness: Yes  impotence: No  penile discharge: Yes  In general, how would you rate your overall mental or emotional health?: good  Additional concerns today:: Yes  Duration of exercise:: 30-45 minutes    Arthritis   We will test for rheumatoid arthritis due to the fact that he mentioned morning gelling and stiffness. The stiffness in the hands and knees is relieved with movement.     Rash on right shin  Patient reports symptoms of scaly skin , itchy, hot to touch. He has tried aloe vera, gold bond among other treatments with no relief and this rash has increased in size. He's had this rash since January or February, and " has had similar ones on his elbows in the past he states.     Additional Information   [facility] visits from [place]. New shingrix vaccine discussed with patient today. He mentions findings of polyps during colonoscopy 1/23/18 and endoscopy 4/24/18    Reviewed and updated as needed this visit by clinical staff       Reviewed and updated as needed this visit by Provider        Social History   Substance Use Topics     Smoking status: Never Smoker     Smokeless tobacco: Never Used     Alcohol use 0.0 oz/week     0 Standard drinks or equivalent per week      Comment: Occasional glass of wine and beer.       Alcohol Use 11/27/2018   If you drink alcohol do you typically have greater than 3 drinks per day OR greater than 7 drinks per week? No     Current providers sharing in care for this patient include:   Patient Care Team:  Isaac Knutson MD as PCP - General (Internal Medicine)  Magda Mcgarry as   Fawad Robles MD (Ophthalmology)    The following health maintenance items are reviewed in Epic and correct as of today:  Health Maintenance   Topic Date Due     AORTIC ANEURYSM SCREENING (SYSTEM ASSIGNED)  01/06/2012     ADVANCE DIRECTIVE PLANNING Q5 YRS  05/20/2018     TETANUS IMMUNIZATION (SYSTEM ASSIGNED)  07/17/2018     INFLUENZA VACCINE (1) 09/01/2018     LIPID MONITORING Q1 YEAR  10/31/2018     EYE EXAM Q1 YEAR  11/14/2018     FALL RISK ASSESSMENT  10/31/2018     PHQ-2 Q1 YR  11/29/2019     COLON CANCER SCREEN (SYSTEM ASSIGNED)  01/23/2028     PNEUMOCOCCAL  Completed     HEPATITIS C SCREENING  Completed     BP Readings from Last 3 Encounters:   11/29/18 132/70   10/31/17 144/80   04/18/17 128/66    Wt Readings from Last 3 Encounters:   11/29/18 80.7 kg (178 lb)   10/31/17 79.8 kg (176 lb)   04/18/17 81.2 kg (179 lb)        Patient Active Problem List   Diagnosis     Health Care Home     Advanced directives, counseling/discussion      Hyperlipidemia with target LDL less than 130     Diverticulosis of colon     Elevated liver enzymes     Hypovitaminosis D     Environmental allergies     Seasonal allergic rhinitis     Pneumonia     BPH (benign prostatic hyperplasia)     Diverticulitis of colon     Chronic nonalcoholic liver disease     Microscopic hematuria     Umbilical hernia     Elevated glucose     Elevated prostate specific antigen (PSA)     Drusen of macula of both eyes     Posterior vitreous detachment of right eye     Nonexudative senile macular degeneration of retina     Senile nuclear sclerosis, bilateral     Slow transit constipation     Vitreous syneresis of left eye     PVD (posterior vitreous detachment), both eyes     History of diverticulitis of colon     History of partial colectomy     Meibomian gland dysfunction (MGD) of both eyes     Dry eye of right side     Past Surgical History:   Procedure Laterality Date     ARTHROSCOPY KNEE  96    right knee, LMT     ARTHROSCOPY KNEE  1988    left knee, MMT     COLONOSCOPY  10/7/2002    diverticulosis, due again in      COLONOSCOPY  2013     SMALL BOWEL RESECTION  3/19/2014    small bowel resection, lysis of adhesions and pelvic drain placement     TONSILLECTOMY & ADENOIDECTOMY  age of 15       Social History   Substance Use Topics     Smoking status: Never Smoker     Smokeless tobacco: Never Used     Alcohol use 0.0 oz/week     0 Standard drinks or equivalent per week      Comment: Occasional glass of wine and beer.     Family History   Problem Relation Age of Onset     Cancer Mother      Colon Cancer     Diabetes Brother      My brother  from complications of diabetes     Glaucoma No family hx of      Macular Degeneration No family hx of              Review of Systems   Constitutional: Positive for fever.   HENT: Positive for congestion and ear pain. Negative for hearing loss and sore throat.    Eyes: Positive for pain and visual disturbance.   Respiratory: Negative  "for cough and shortness of breath.    Cardiovascular: Negative for palpitations and peripheral edema.   Gastrointestinal: Positive for abdominal pain, constipation, diarrhea and heartburn. Negative for nausea.   Genitourinary: Positive for discharge. Negative for dysuria, frequency, genital sores, impotence and urgency.   Musculoskeletal: Positive for arthralgias and myalgias. Negative for joint swelling.   Skin: Positive for rash.   Neurological: Positive for weakness, headaches and paresthesias. Negative for dizziness.   Psychiatric/Behavioral: Positive for mood changes. The patient is nervous/anxious.      This document serves as a record of the services and decisions personally performed and made by Isaac Knutson MD. It was created on his behalf by Adis Thurman, a trained medical scribe. The creation of this document is based on the provider's statements to the medical scribe.  Adis Thurman 2:30 PM November 29, 2018    OBJECTIVE:   /70  Pulse 77  Temp 96.8  F (36  C) (Oral)  Resp 18  Ht 5' 5\" (1.651 m)  Wt 178 lb (80.7 kg)  SpO2 100%  BMI 29.62 kg/m2 Estimated body mass index is 29.51 kg/(m^2) as calculated from the following:    Height as of 10/31/17: 5' 4.75\" (1.645 m).    Weight as of 10/31/17: 176 lb (79.8 kg).  Physical Exam  GENERAL: healthy, alert and no distress  EYES: Eyes grossly normal to inspection, PERRL and conjunctivae and sclerae normal  HENT: left ear completely occluded with cerumen, right ear normal   NECK: no adenopathy, no asymmetry, masses, or scars and thyroid normal to palpation  RESP: lungs clear to auscultation - no rales, rhonchi or wheezes  CV: regular rate and rhythm, normal S1 S2, no S3 or S4, no murmur, click or rub, no peripheral edema and peripheral pulses strong  ABDOMEN: soft, nontender, no hepatosplenomegaly, no masses and bowel sounds normal  MS: no gross musculoskeletal defects noted, no edema  SKIN: on the right shin he has a rash that is oval in shape of 6 " times 8 centimeters  NEURO: Normal strength and tone, mentation intact and speech normal  PSYCH: mentation appears normal, affect normal/bright    Diagnostic Test Results:  No results found for this or any previous visit (from the past 24 hour(s)).    ASSESSMENT / PLAN:   (Z00.00) Routine history and physical examination of adult  (primary encounter diagnosis)  Comment: routine screening issues   Plan: see orders section of this encounter     (Z23) Need for shingles vaccine  Comment: pharmacy router form given   Plan:     (E78.5) Hyperlipidemia with target LDL less than 130  Comment: due for laboratory studies today   Plan: Lipid panel reflex to direct LDL Fasting            (E55.9) Hypovitaminosis D  Comment: due for recheck   Plan: Basic metabolic panel, Vitamin D Deficiency            (R73.09) Elevated glucose  Comment: doubt clinical significance but warrants hemoglobin a1c  [ diabetes test ]   Plan: Hemoglobin A1c, Basic metabolic panel            (Z23) Need for Tdap vaccination  Comment: administered   Plan: TDAP, IM (10 - 64 YRS) - Adacel            (L30.9) Eczema, unspecified type  Comment: rash on right lower extremity  , most consistent with eczema   Plan: triamcinolone (KENALOG) 0.1 % external ointment        Other care instructions reviewed     (M19.90) Arthritis  Comment: patient has a history of ongoing and multiple joint pains. There weren't active synovitis findings and this does not sound like a genuine inflammatory arthritis however I can't be positive and so additional testing is to be pursued. Consider rheumatological consultation   Plan: Rheumatoid factor, Erythrocyte sedimentation         rate auto, CRP inflammation, CBC with platelets        and differential, Anti Nuclear Apolonia IgG by IFA         with Reflex            (H61.22) Impacted cerumen of left ear  Comment: successfully irrigated out by my medical assistant    Plan: REMOVE IMPACTED CERUMEN              End of Life Planning:  Patient  "currently has an advanced directive:     COUNSELING:  Reviewed preventive health counseling, as reflected in patient instructions    BP Readings from Last 1 Encounters:   10/31/17 144/80     Estimated body mass index is 29.51 kg/(m^2) as calculated from the following:    Height as of 10/31/17: 5' 4.75\" (1.645 m).    Weight as of 10/31/17: 176 lb (79.8 kg).    BP Screening:   Last 3 BP Readings:    BP Readings from Last 3 Encounters:   11/29/18 132/70   10/31/17 144/80   04/18/17 128/66       The following was recommended to the patient:  Re-screen BP within a year and recommended lifestyle modifications       reports that he has never smoked. He has never used smokeless tobacco.      Appropriate preventive services were discussed with this patient, including applicable screening as appropriate for cardiovascular disease, diabetes, osteopenia/osteoporosis, and glaucoma.  As appropriate for age/gender, discussed screening for colorectal cancer, prostate cancer, breast cancer, and cervical cancer. Checklist reviewing preventive services available has been given to the patient.    Reviewed patients plan of care and provided an AVS. The Complex Care Plan (for patients with higher acuity and needing more deliberate coordination of services) for Chi meets the Care Plan requirement. This Care Plan has been established and reviewed with the Patient.    Counseling Resources:  ATP IV Guidelines  Pooled Cohorts Equation Calculator  Breast Cancer Risk Calculator  FRAX Risk Assessment  ICSI Preventive Guidelines  Dietary Guidelines for Americans, 2010  USDA's MyPlate  ASA Prophylaxis  Lung CA Screening    The information in this document, created by the medical scribe for me, accurately reflects the services I personally performed and the decisions made by me. I have reviewed and approved this document for accuracy prior to leaving the patient care area.  November 29, 2018 2:33 PM    Isaac Knutson MD  [facility] " [facility]    Injectable Influenza Immunization Documentation    1.  Is the person to be vaccinated sick today?   No    2. Does the person to be vaccinated have an allergy to a component   of the vaccine?   No  Egg Allergy Algorithm Link    3. Has the person to be vaccinated ever had a serious reaction   to influenza vaccine in the past?   No    4. Has the person to be vaccinated ever had Guillain-Barré syndrome?   No    Form completed by Cassandra SAAVEDRA CMA ([facility])

## 2018-11-30 LAB
ANA SER QL IF: NEGATIVE
DEPRECATED CALCIDIOL+CALCIFEROL SERPL-MC: 15 UG/L (ref 20–75)
RHEUMATOID FACT SER NEPH-ACNC: <20 IU/ML (ref 0–20)

## 2018-12-03 ENCOUNTER — TELEPHONE (OUTPATIENT)
Dept: INTERNAL MEDICINE | Facility: CLINIC | Age: 73
End: 2018-12-03

## 2018-12-03 DIAGNOSIS — E78.5 HYPERLIPIDEMIA WITH TARGET LDL LESS THAN 130: ICD-10-CM

## 2018-12-03 DIAGNOSIS — R79.89 LOW VITAMIN D LEVEL: Primary | ICD-10-CM

## 2018-12-03 RX ORDER — PRAVASTATIN SODIUM 20 MG
20 TABLET ORAL DAILY
Qty: 90 TABLET | Refills: 3 | Status: SHIPPED | OUTPATIENT
Start: 2018-12-03 | End: 2020-01-13

## 2018-12-05 ENCOUNTER — OFFICE VISIT (OUTPATIENT)
Dept: OPHTHALMOLOGY | Facility: CLINIC | Age: 73
End: 2018-12-05
Payer: COMMERCIAL

## 2018-12-05 DIAGNOSIS — H43.813 PVD (POSTERIOR VITREOUS DETACHMENT), BOTH EYES: ICD-10-CM

## 2018-12-05 DIAGNOSIS — H02.886 MEIBOMIAN GLAND DYSFUNCTION (MGD) OF BOTH EYES: ICD-10-CM

## 2018-12-05 DIAGNOSIS — H35.3131 EARLY DRY STAGE NONEXUDATIVE AGE-RELATED MACULAR DEGENERATION OF BOTH EYES: ICD-10-CM

## 2018-12-05 DIAGNOSIS — Z01.01 ENCOUNTER FOR EXAMINATION OF EYES AND VISION WITH ABNORMAL FINDINGS: ICD-10-CM

## 2018-12-05 DIAGNOSIS — H52.4 PRESBYOPIA: ICD-10-CM

## 2018-12-05 DIAGNOSIS — H02.883 MEIBOMIAN GLAND DYSFUNCTION (MGD) OF BOTH EYES: ICD-10-CM

## 2018-12-05 DIAGNOSIS — H25.13 SENILE NUCLEAR SCLEROSIS, BILATERAL: Primary | ICD-10-CM

## 2018-12-05 DIAGNOSIS — H04.121 DRY EYE OF RIGHT SIDE: ICD-10-CM

## 2018-12-05 PROCEDURE — 92014 COMPRE OPH EXAM EST PT 1/>: CPT | Performed by: STUDENT IN AN ORGANIZED HEALTH CARE EDUCATION/TRAINING PROGRAM

## 2018-12-05 PROCEDURE — 92015 DETERMINE REFRACTIVE STATE: CPT | Performed by: STUDENT IN AN ORGANIZED HEALTH CARE EDUCATION/TRAINING PROGRAM

## 2018-12-05 ASSESSMENT — EXTERNAL EXAM - LEFT EYE: OS_EXAM: NORMAL

## 2018-12-05 ASSESSMENT — REFRACTION_WEARINGRX
OD_AXIS: 013
OD_CYLINDER: +1.00
OS_ADD: +2.50
OD_ADD: +2.50
OS_CYLINDER: +0.25
OS_SPHERE: -2.00
SPECS_TYPE: PAL
OD_SPHERE: -1.75
OS_AXIS: 025

## 2018-12-05 ASSESSMENT — SLIT LAMP EXAM - LIDS
COMMENTS: 1+ MEIBOMIAN GLAND DYSFUNCTION
COMMENTS: 1+ MEIBOMIAN GLAND DYSFUNCTION

## 2018-12-05 ASSESSMENT — CONF VISUAL FIELD
OD_NORMAL: 1
OS_NORMAL: 1

## 2018-12-05 ASSESSMENT — REFRACTION_MANIFEST
OD_ADD: +3.00
OD_AXIS: 020
OD_SPHERE: -1.75
OS_SPHERE: -2.00
OS_CYLINDER: +0.25
OD_CYLINDER: +1.00
OS_ADD: +3.00
OS_AXIS: 015

## 2018-12-05 ASSESSMENT — VISUAL ACUITY
OS_CC+: -2
METHOD: SNELLEN - LINEAR
OD_CC+: -2
OS_CC: 20/15
OD_CC: 20/25

## 2018-12-05 ASSESSMENT — TONOMETRY
OS_IOP_MMHG: 13
OD_IOP_MMHG: 16
IOP_METHOD: APPLANATION

## 2018-12-05 ASSESSMENT — EXTERNAL EXAM - RIGHT EYE: OD_EXAM: NORMAL

## 2018-12-05 ASSESSMENT — CUP TO DISC RATIO
OS_RATIO: 0.5
OD_RATIO: 0.5

## 2018-12-05 NOTE — MR AVS SNAPSHOT
"              After Visit Summary   12/5/2018    Chi Nemwan    MRN: [id number]           Patient Information     Date Of Birth          1945        Visit Information        Provider Department      12/5/2018 2:30 PM Connie Martinez MD [facility]        Today's Diagnoses     Encounter for examination of eyes and vision with abnormal findings    -  1    Presbyopia        Senile nuclear sclerosis, bilateral        Drusen of macula of both eyes        PVD (posterior vitreous detachment), both eyes        Dry eye of right side          Care Instructions    Use artificial tears up to four times a day (Refresh Plus, Systane Balance, or generic artificial tears are ok. Avoid \"get the red out\" drops).   Glasses prescription given     Connie ANGELES. Juan ARRIOLA  [phone]            Follow-ups after your visit        Follow-up notes from your care team     Return in about 1 year (around 12/5/2019) for Complete Exam.      Who to contact     If you have questions or need follow up information about today's clinic visit or your schedule please contact [facility] directly at [phone].  Normal or non-critical lab and imaging results will be communicated to you by HackerEarthhart, letter or phone within 4 business days after the clinic has received the results. If you do not hear from us within 7 days, please contact the clinic through [employer] or phone. If you have a critical or abnormal lab result, we will notify you by phone as soon as possible.  Submit refill requests through [employer] or call your pharmacy and they will forward the refill request to us. Please allow 3 business days for your refill to be completed.          Additional Information About Your Visit        MyChart Information     [employer] gives you secure access to your electronic health record. If you see a primary care provider, you can also send messages to your care team and make appointments. If you have questions, please call " your primary care clinic.  If you do not have a primary care provider, please call [phone] and they will assist you.        Care EveryWhere ID     This is your Care EveryWhere ID. This could be used by other organizations to access your [place] medical records  [id number]         Blood Pressure from Last 3 Encounters:   11/29/18 132/70   10/31/17 144/80   04/18/17 128/66    Weight from Last 3 Encounters:   11/29/18 80.7 kg (178 lb)   10/31/17 79.8 kg (176 lb)   04/18/17 81.2 kg (179 lb)              We Performed the Following     EYE EXAM (SIMPLE-NONBILLABLE)     REFRACTIVE STATUS        Primary Care Provider Office Phone # Fax #    Isaac Knutson [phone] [address] [facility] [address]        Equal Access to Services     [facility]: Hadii aad ku hadasho Soomaali, waaxda luqadaha, qaybta kaalmada adeegyada, waxay idiin hayaan zohra khtristan martinez . So [facility] [phone].    ATENCIÓN: Si habla español, tiene a gilliam disposición servicios gratuitos de asistencia lingüística. Llame al [phone].    We comply with applicable federal civil rights laws and [place] laws. We do not discriminate on the basis of race, color, national origin, age, disability, sex, sexual orientation, or gender identity.            Thank you!     Thank you for choosing [facility]  for your care. Our goal is always to provide you with excellent care. Hearing back from our patients is one way we can continue to improve our services. Please take a few minutes to complete the written survey that you may receive in the mail after your visit with us. Thank you!             Your Updated Medication List - Protect others around you: Learn how to safely use, store and throw away your medicines at www.disposemymeds.org.          This list is accurate as of 12/5/18  3:27 PM.  Always use your most recent med list.                   Brand Name Dispense Instructions for use Diagnosis     acetaminophen 500 MG tablet    TYLENOL     Take 500-1,000 mg by mouth every 6 hours as needed        fexofenadine 180 MG tablet    ALLEGRA    90 tablet    Take 1 tablet (180 mg) by mouth daily Take 180 mg by mouth once daily. Indications: SEASONAL ALLERGIC RHINITIS    Environmental allergies, Seasonal allergic rhinitis       fluticasone 50 MCG/ACT nasal spray    FLONASE    16 g    USE 1 OR 2 SPRAYS IN EACH NOSTRIL DAILY    Environmental allergies       * hypromellose 0.3 % Gel ophthalmic gel    GENTEAL     Place 1 drop into both eyes every hour as needed for dry eyes        * hypromellose 0.3 % Soln ophthalmic solution    GENTEAL     1 drop every hour as needed for dry eyes        ibuprofen 200 MG tablet    ADVIL/MOTRIN     Take 200 mg by mouth every 4 hours as needed        pravastatin 20 MG tablet    PRAVACHOL    90 tablet    Take 1 tablet (20 mg) by mouth daily    Hyperlipidemia with target LDL less than 130       SYSTANE BALANCE OP      Apply 1 drop to eye 4 times daily        triamcinolone 0.1 % external ointment    KENALOG    30 g    Apply sparingly to affected area 2 times a day until cleared    Eczema, unspecified type       * vitamin D3 1000 units Caps      Take 1,000 Units by mouth        * vitamin D3 1000 units (25 mcg) tablet    CHOLECALCIFEROL    100 tablet    Take 3 tablets (3,000 Units) by mouth daily    Low vitamin D level       ZANTAC PO           * Notice:  This list has 4 medication(s) that are the same as other medications prescribed for you. Read the directions carefully, and ask your doctor or other care provider to review them with you.

## 2018-12-05 NOTE — LETTER
"    12/5/2018         RE: Chi Newman  [address] [facility]  [place] [phone]        Dear Colleague,    Thank you for referring your patient, Chi Newman, to the [facility]. Please see a copy of my visit note below.     Current Eye Medications:  Genteal prn     Subjective:  Complete eye exam     Reading is getting difficult.  Right eye \"sticky\" like something in the eye.      Objective:  See Ophthalmology Exam.       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 Avoid \"get the red out\" drops).   Glasses prescription given     Connie Martinez MD  [phone]        Again, thank you for allowing me to participate in the care of your patient.        Sincerely,        Connie Martinez MD    "

## 2018-12-05 NOTE — PROGRESS NOTES
" Current Eye Medications:  Genteal prn     Subjective:  Complete eye exam     Reading is getting difficult.  Right eye \"sticky\" like something in the eye.      Objective:  See Ophthalmology Exam.       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 Avoid \"get the red out\" drops).   Glasses prescription given     Connie Martinez MD  [phone]      "

## 2018-12-11 ENCOUNTER — DOCUMENTATION ONLY (OUTPATIENT)
Dept: OTHER | Facility: CLINIC | Age: 73
End: 2018-12-11

## 2019-03-05 ENCOUNTER — MYC MEDICAL ADVICE (OUTPATIENT)
Dept: FAMILY MEDICINE | Facility: CLINIC | Age: 74
End: 2019-03-05

## 2019-03-05 DIAGNOSIS — Z91.09 ENVIRONMENTAL ALLERGIES: ICD-10-CM

## 2019-03-05 RX ORDER — FLUTICASONE PROPIONATE 50 MCG
SPRAY, SUSPENSION (ML) NASAL
Qty: 16 G | Refills: 6 | Status: CANCELLED | OUTPATIENT
Start: 2019-03-05

## 2019-03-05 RX ORDER — FLUTICASONE PROPIONATE 50 MCG
SPRAY, SUSPENSION (ML) NASAL
Qty: 16 G | Refills: 6 | Status: SHIPPED | OUTPATIENT
Start: 2019-03-05 | End: 2019-10-02

## 2019-03-18 ENCOUNTER — MYC MEDICAL ADVICE (OUTPATIENT)
Dept: FAMILY MEDICINE | Facility: CLINIC | Age: 74
End: 2019-03-18

## 2019-03-25 ENCOUNTER — MYC MEDICAL ADVICE (OUTPATIENT)
Dept: FAMILY MEDICINE | Facility: CLINIC | Age: 74
End: 2019-03-25

## 2019-04-12 ENCOUNTER — OFFICE VISIT (OUTPATIENT)
Dept: FAMILY MEDICINE | Facility: CLINIC | Age: 74
End: 2019-04-12
Payer: COMMERCIAL

## 2019-04-12 VITALS
TEMPERATURE: 97.5 F | HEART RATE: 78 BPM | OXYGEN SATURATION: 100 % | DIASTOLIC BLOOD PRESSURE: 70 MMHG | SYSTOLIC BLOOD PRESSURE: 126 MMHG | WEIGHT: 176 LBS | HEIGHT: 65 IN | BODY MASS INDEX: 29.32 KG/M2 | RESPIRATION RATE: 18 BRPM

## 2019-04-12 DIAGNOSIS — H61.23 BILATERAL IMPACTED CERUMEN: ICD-10-CM

## 2019-04-12 DIAGNOSIS — Z13.6 ENCOUNTER FOR ABDOMINAL AORTIC ANEURYSM (AAA) SCREENING: ICD-10-CM

## 2019-04-12 DIAGNOSIS — Z23 NEED FOR SHINGLES VACCINE: Primary | ICD-10-CM

## 2019-04-12 PROCEDURE — 69209 REMOVE IMPACTED EAR WAX UNI: CPT | Mod: 50 | Performed by: INTERNAL MEDICINE

## 2019-04-12 ASSESSMENT — MIFFLIN-ST. JEOR: SCORE: 1475.21

## 2019-04-12 NOTE — PROGRESS NOTES
Last appointment with me was     Preventative healthcare maintenance goals including screening for abdominal aortic aneurysm , ShingRx vaccination against herpes zoster

## 2019-04-12 NOTE — PROGRESS NOTES
SUBJECTIVE:   Chi Newman is a 74 year old male who presents to clinic today for the following   health issues:       Need for shingles vaccine  Bilateral impacted cerumen  Encounter for abdominal aortic aneurysm (AAA) screening      Preventative healthcare maintenance goals including screening for abdominal aortic aneurysm , ShingRx vaccination against herpes zoster     Cerumen Impaction  Patient reports symptoms of hearing loss which have been present for the last few months and worsened. He's had issues with cerumen impaction in the past.     Additional Information   Discussed AAA (abdominal aortic aneurysm) screening at today's visit he had a CT abdomen from last year (with no mention of the aorta). I will have the radiologist take a look at the image and give us a report of the aorta.     Additional history: as documented    Reviewed  and updated as needed this visit by clinical staff  Tobacco  Allergies  Med Hx  Surg Hx  Fam Hx  Soc Hx      Reviewed and updated as needed this visit by Provider       Patient Active Problem List   Diagnosis     Health Care Home     Hyperlipidemia with target LDL less than 130     Diverticulosis of colon     Elevated liver enzymes     Hypovitaminosis D     Environmental allergies     Seasonal allergic rhinitis     Pneumonia     BPH (benign prostatic hyperplasia)     Diverticulitis of colon     Chronic nonalcoholic liver disease     Microscopic hematuria     Umbilical hernia     Elevated glucose     Elevated prostate specific antigen (PSA)     Drusen of macula of both eyes     Posterior vitreous detachment of right eye     Nonexudative senile macular degeneration of retina     Senile nuclear sclerosis, bilateral     Slow transit constipation     Vitreous syneresis of left eye     PVD (posterior vitreous detachment), both eyes     History of diverticulitis of colon     History of partial colectomy     Meibomian gland dysfunction (MGD) of both eyes     Dry eye of right  "side     Past Surgical History:   Procedure Laterality Date     ABDOMEN SURGERY      Diverticulitus     ARTHROSCOPY KNEE  96    right knee, LMT     ARTHROSCOPY KNEE  1988    left knee, MMT     BIOPSY      Colonoscopy / Endoscopy     COLONOSCOPY  10/7/2002    diverticulosis, due again in      COLONOSCOPY  2013     SMALL BOWEL RESECTION  3/19/2014    small bowel resection, lysis of adhesions and pelvic drain placement     TONSILLECTOMY & ADENOIDECTOMY  age of 15       Social History     Tobacco Use     Smoking status: Never Smoker     Smokeless tobacco: Never Used   Substance Use Topics     Alcohol use: Yes     Alcohol/week: 0.0 oz     Comment: Occasional glass of wine and beer.     Family History   Problem Relation Age of Onset     Cancer Mother         Colon Cancer     Diabetes Brother         My brother  from complications of diabetes     Glaucoma No family hx of      Macular Degeneration No family hx of          BP Readings from Last 3 Encounters:   19 126/70   18 132/70   10/31/17 144/80    Wt Readings from Last 3 Encounters:   19 79.8 kg (176 lb)   18 80.7 kg (178 lb)   10/31/17 79.8 kg (176 lb)         ROS:  Constitutional, HEENT, cardiovascular, pulmonary, GI, , musculoskeletal, neuro, skin, endocrine and psych systems are negative, except as otherwise noted.    This document serves as a record of the services and decisions personally performed and made by Isaac Knutson MD. It was created on his behalf by Adis Thurman, a trained medical scribe. The creation of this document is based on the provider's statements to the medical scribe.  Adis Thurman 11:23 AM 2019    OBJECTIVE:     /70   Pulse 78   Temp 97.5  F (36.4  C) (Oral)   Resp 18   Ht 1.651 m (5' 5\")   Wt 79.8 kg (176 lb)   SpO2 100%   BMI 29.29 kg/m    Body mass index is 29.29 kg/m .  GENERAL: healthy, alert and no distress  EYES: Eyes grossly normal to inspection, PERRL and " conjunctivae and sclerae normal  HENT: he has cerumen impaction bilaterally 100% occlusion on the left and significant impaction with the right ear canal  SKIN: no suspicious lesions or rashes to visible skin   NEURO: Normal strength and tone, mentation intact and speech normal  PSYCH: mentation appears normal, affect normal/bright    Diagnostic Test Results:  No results found for this or any previous visit (from the past 24 hour(s)).    ASSESSMENT/PLAN:     (Z23) Need for shingles vaccine  (primary encounter diagnosis)  Comment: discuss with patient   Plan:     (H61.23) Bilateral impacted cerumen  Comment: successfully irrigated out by my medical assistant    Plan: REMOVE IMPACTED CERUMEN            (Z13.6) Encounter for abdominal aortic aneurysm (AAA) screening  Comment: double check on prior CT prior to ordering new abdominal aortic aneurysm ultrasound   Plan:     See Patient Instructions    The information in this document, created by the medical scribe for me, accurately reflects the services I personally performed and the decisions made by me. I have reviewed and approved this document for accuracy prior to leaving the patient care area.  April 12, 2019 11:23 AM    Isaac Knutson MD  [facility]

## 2019-04-12 NOTE — Clinical Note
12- patient had  Abdominal CT scan enterography. The radiologist  Didn't comment on the state of this patients aorta. Lets ask him to      Go back and check to see if he can comment on the aorta.

## 2019-06-04 NOTE — TELEPHONE ENCOUNTER
Pt was given provider's message as written.     Orders Placed This Encounter   Procedures     Vitamin D Deficiency     Scripts for vitamin D and pravastatin sent to pharmacy. Pt states he had side effects of achiness, headache, and dizziness when previously taking pravastatin daily. He will notify clinic if having these side effects.    Rachel Ledesma RN  [facility]    
Voice mail left for patient to call RN hotline at [phone]. FYI patient reported he had not been taking Pravastatin at last visit.  Order pending for vit D and lab order.      Notes Recorded by Isaac Knutson MD on 12/2/2018 at 6:15 PM  These labs show us the following things ;     1.tests for inflammatory arthritis are all negative and so with joint pains we expect we're talking about osteoarthritis and musculoskeletal , not inflammatory arthritis like rheumatoid arthritis     2. You have a severely low vitamin D level. It's recommended to take a full 3,000 international units of vitamin D daily. We can recheck Vitamin D levels  In 6-12 months     3. Cholesterol panel numbers continue to support prescribing  Of pravastatin. Should take it every single day in my opinion     4. I think all of the other laboratory studies are fine.    Please let me know if patient has questions for me     Isaac Maria RN    
To change medications

## 2019-07-31 ENCOUNTER — OFFICE VISIT (OUTPATIENT)
Dept: OPHTHALMOLOGY | Facility: CLINIC | Age: 74
End: 2019-07-31
Payer: COMMERCIAL

## 2019-07-31 DIAGNOSIS — H04.123 DRY EYE SYNDROME, BILATERAL: Primary | ICD-10-CM

## 2019-07-31 DIAGNOSIS — H02.886 MEIBOMIAN GLAND DYSFUNCTION (MGD) OF BOTH EYES: ICD-10-CM

## 2019-07-31 DIAGNOSIS — H02.883 MEIBOMIAN GLAND DYSFUNCTION (MGD) OF BOTH EYES: ICD-10-CM

## 2019-07-31 PROCEDURE — 92012 INTRM OPH EXAM EST PATIENT: CPT | Performed by: STUDENT IN AN ORGANIZED HEALTH CARE EDUCATION/TRAINING PROGRAM

## 2019-07-31 RX ORDER — TOBRAMYCIN AND DEXAMETHASONE 3; 1 MG/ML; MG/ML
1 SUSPENSION/ DROPS OPHTHALMIC 3 TIMES DAILY
Qty: 1 BOTTLE | Refills: 1 | Status: SHIPPED | OUTPATIENT
Start: 2019-07-31 | End: 2020-11-11

## 2019-07-31 ASSESSMENT — EXTERNAL EXAM - LEFT EYE: OS_EXAM: NORMAL

## 2019-07-31 ASSESSMENT — VISUAL ACUITY
OS_CC: 20/20
METHOD: SNELLEN - LINEAR
CORRECTION_TYPE: GLASSES
OD_CC: 20/30
OD_PH_CC: 20/25+2

## 2019-07-31 ASSESSMENT — TONOMETRY
OD_IOP_MMHG: 10
IOP_METHOD: ICARE
OS_IOP_MMHG: 10

## 2019-07-31 ASSESSMENT — EXTERNAL EXAM - RIGHT EYE: OD_EXAM: NORMAL

## 2019-07-31 ASSESSMENT — SLIT LAMP EXAM - LIDS
COMMENTS: 1+ MEIBOMIAN GLAND DYSFUNCTION
COMMENTS: 1+ MEIBOMIAN GLAND DYSFUNCTION

## 2019-07-31 NOTE — LETTER
"    7/31/2019         RE: Chi Newman  [address] [place] [address]  [place] [address]        Dear Colleague,    Thank you for referring your patient, Chi Newman, to the [facility]. Please see a copy of my visit note below.     Current Eye Medications: Systane 3-4 times a day     Subjective:  Irritated right eye  As above plus eyes feel dry, more so right eye. Pt states has had these same symptoms for year , severity staying about the same. Pt also reports he still is bothered by floaters right eye since had PVD  In this eye several years ago.     Objective:  See Ophthalmology Exam.      Assessment:  Chi Newman is a 74 year old male who presents with:     Dry eye syndrome, bilateral Mild bulbar conjunctivitis. Possible mild episcleritis.     Meibomian gland dysfunction (MGD) of both eyes        Plan:  Use a gel tear at bedtime or up to four times a day (like Refresh Liquigel or GenTeal Gel Tears). Artificial tears (Refresh Optive, Systane Balance, TheraTears, or generic artificial tears are ok. Avoid \"get the red out\" drops) will blur the vision less.     Tobradex: One drop right eye three times a day for 7- 10 days.    Use a warm compress on the eyes daily.    Connie Martinez MD  [phone]             Again, thank you for allowing me to participate in the care of your patient.        Sincerely,        Connie Martinez MD    "

## 2019-07-31 NOTE — PATIENT INSTRUCTIONS
"Use a gel tear at bedtime or up to four times a day (like Refresh Liquigel or GenTeal Gel Tears). Artificial tears (Refresh Optive, Systane Balance, TheraTears, or generic artificial tears are ok. Avoid \"get the red out\" drops) will blur the vision less.   Tobradex: One drop right eye three times a day for 7- 10 days.  Use a warm compress on the eyes daily.    Connie Martinez MD  [phone]    "

## 2019-07-31 NOTE — PROGRESS NOTES
" Current Eye Medications: Systane 3-4 times a day     Subjective:  Irritated right eye  As above plus eyes feel dry, more so right eye. Pt states has had these same symptoms for year , severity staying about the same. Pt also reports he still is bothered by floaters right eye since had PVD  In this eye several years ago.     Objective:  See Ophthalmology Exam.      Assessment:  Chi Newman is a 74 year old male who presents with:     Dry eye syndrome, bilateral Mild bulbar conjunctivitis. Possible mild episcleritis.     Meibomian gland dysfunction (MGD) of both eyes        Plan:  Use a gel tear at bedtime or up to four times a day (like Refresh Liquigel or GenTeal Gel Tears). Artificial tears (Refresh Optive, Systane Balance, TheraTears, or generic artificial tears are ok. Avoid \"get the red out\" drops) will blur the vision less.     Tobradex: One drop right eye three times a day for 7- 10 days.    Use a warm compress on the eyes daily.    Connie Martinez MD  [phone]           "

## 2019-08-01 ENCOUNTER — OFFICE VISIT (OUTPATIENT)
Dept: FAMILY MEDICINE | Facility: CLINIC | Age: 74
End: 2019-08-01
Payer: COMMERCIAL

## 2019-08-01 VITALS
HEART RATE: 75 BPM | HEIGHT: 65 IN | TEMPERATURE: 98.7 F | OXYGEN SATURATION: 100 % | BODY MASS INDEX: 29.49 KG/M2 | WEIGHT: 177 LBS | SYSTOLIC BLOOD PRESSURE: 104 MMHG | DIASTOLIC BLOOD PRESSURE: 56 MMHG | RESPIRATION RATE: 20 BRPM

## 2019-08-01 DIAGNOSIS — H61.23 CERUMINOSIS, BILATERAL: Primary | ICD-10-CM

## 2019-08-01 DIAGNOSIS — H90.6 MIXED CONDUCTIVE AND SENSORINEURAL HEARING LOSS OF BOTH EARS: ICD-10-CM

## 2019-08-01 DIAGNOSIS — Z13.6 ENCOUNTER FOR ABDOMINAL AORTIC ANEURYSM (AAA) SCREENING: ICD-10-CM

## 2019-08-01 PROCEDURE — 99213 OFFICE O/P EST LOW 20 MIN: CPT | Performed by: INTERNAL MEDICINE

## 2019-08-01 ASSESSMENT — MIFFLIN-ST. JEOR: SCORE: 1479.75

## 2019-08-01 NOTE — PROGRESS NOTES
Last appointment with me was 4- and is ostensibly seeking just ear wax removal. Last complete physical exam 11/2018    Preventative healthcare maintenance goals including      Health Maintenance   Topic Date Due     AORTIC ANEURYSM SCREENING (SYSTEM ASSIGNED)  01/06/2012     PHQ-2  01/01/2019     ZOSTER IMMUNIZATION (3 of 3) 07/17/2019     INFLUENZA VACCINE (1) 09/01/2019     MEDICARE ANNUAL WELLNESS VISIT  11/29/2019     LIPID  11/29/2019     FALL RISK ASSESSMENT  11/29/2019     EYE EXAM  12/05/2019     ADVANCE CARE PLANNING  12/11/2023     COLONOSCOPY  01/23/2028     DTAP/TDAP/TD IMMUNIZATION (3 - Td) 11/29/2028     HEPATITIS C SCREENING  Completed     PNEUMOCOCCAL IMMUNIZATION 65+ LOW/MEDIUM RISK  Completed     IPV IMMUNIZATION  Aged Out     MENINGITIS IMMUNIZATION  Aged Out

## 2019-08-01 NOTE — PROGRESS NOTES
Subjective     Chi Newman is a 74 year old male who presents to clinic today for the following health issues:    HPI   Hearing Loss  He reports that lately he feels like his ears are plugged. He reports that he notes some frequent clumps of wax that fall out of his ear at night. He has appreciated some changes to his hearing and often finds himself asking others to repeat information. He isn't really sure of his hearing really drops out, but he finds that his hearing seems worse in the mornings. He also has noted some variability to his hearing with the change in seasons. He has some significant allergy symptoms and uses nasal irrigation to help with this.  He has not had a formal audiologic consultation for roughly 20 years     Additional Information   Discussed AAA (abdominal aortic aneurysm) screening at today's visit and he had a abdominal CT scanand the abdominal aorta was described as normal. This healthcare maintenance issue is removed from the healthcare maintenance list     Patient Active Problem List   Diagnosis     Health Care Home     Hyperlipidemia with target LDL less than 130     Diverticulosis of colon     Elevated liver enzymes     Hypovitaminosis D     Environmental allergies     Seasonal allergic rhinitis     Pneumonia     BPH (benign prostatic hyperplasia)     Diverticulitis of colon     Chronic nonalcoholic liver disease     Microscopic hematuria     Umbilical hernia     Elevated glucose     Elevated prostate specific antigen (PSA)     Drusen of macula of both eyes     Posterior vitreous detachment of right eye     Nonexudative senile macular degeneration of retina     Senile nuclear sclerosis, bilateral     Slow transit constipation     Vitreous syneresis of left eye     PVD (posterior vitreous detachment), both eyes     History of diverticulitis of colon     History of partial colectomy     Meibomian gland dysfunction (MGD) of both eyes     Dry eye of right side     Past Surgical History:  "  Procedure Laterality Date     ABDOMEN SURGERY      Diverticulitus     ARTHROSCOPY KNEE  96    right knee, LMT     ARTHROSCOPY KNEE  1988    left knee, MMT     BIOPSY      Colonoscopy / Endoscopy     COLONOSCOPY  10/7/2002    diverticulosis, due again in      COLONOSCOPY  2013     SMALL BOWEL RESECTION  3/19/2014    small bowel resection, lysis of adhesions and pelvic drain placement     TONSILLECTOMY & ADENOIDECTOMY  age of 15       Social History     Tobacco Use     Smoking status: Never Smoker     Smokeless tobacco: Never Used   Substance Use Topics     Alcohol use: Yes     Alcohol/week: 0.0 oz     Comment: Occasional glass of wine and beer.     Family History   Problem Relation Age of Onset     Cancer Mother         Colon Cancer     Colon Cancer Mother          of Colon/Intestinal cancer     Diabetes Brother         My brother  from complications of diabetes     Glaucoma No family hx of      Macular Degeneration No family hx of          BP Readings from Last 3 Encounters:   19 104/56   19 126/70   18 132/70    Wt Readings from Last 3 Encounters:   19 80.3 kg (177 lb)   19 79.8 kg (176 lb)   18 80.7 kg (178 lb)         Reviewed and updated as needed this visit by Provider       Review of Systems   ROS COMP: Constitutional, HEENT, cardiovascular, pulmonary, GI, , musculoskeletal, neuro, skin, endocrine and psych systems are negative, except as otherwise noted.    This document serves as a record of the services and decisions personally performed and made by Isaac Knutson MD. It was created on his behalf by Adis Thurman, a trained medical scribe. The creation of this document is based on the provider's statements to the medical scribe.  Adis Thurman 2:35 PM 2019      Objective    /56   Pulse 75   Temp 98.7  F (37.1  C) (Oral)   Resp 20   Ht 1.651 m (5' 5\")   Wt 80.3 kg (177 lb)   SpO2 100%   BMI 29.45 kg/m    Body mass index " is 29.45 kg/m .  Physical Exam   GENERAL: healthy, alert and no distress  EYES: Eyes grossly normal to inspection, PERRL and conjunctivae and sclerae normal  HENT: ear canals with some mild cerumen but certainly not blocking the canals as TM's are fully visible, nose and mouth without ulcers or lesions  SKIN: no suspicious lesions or rashes to visible skin   NEURO: Normal strength and tone, mentation intact and speech normal  PSYCH: mentation appears normal, affect normal/bright    Diagnostic Test Results:  Labs reviewed in Epic  No results found for this or any previous visit (from the past 24 hour(s)).      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    The information in this document, created by the medical scribe for me, accurately reflects the services I personally performed and the decisions made by me. I have reviewed and approved this document for accuracy prior to leaving the patient care area.  August 1, 2019 2:35 PM    Isaac Knutson MD  [facility]

## 2019-08-01 NOTE — PATIENT INSTRUCTIONS
1) You can try adding a drop of oil (olive oil, shun oil) once a month to the ear canal in order to help with wax.     2) You should follow-up 2-6 months later for a second booster for the new shingles vaccine (ShingRx).

## 2019-10-02 DIAGNOSIS — Z91.09 ENVIRONMENTAL ALLERGIES: ICD-10-CM

## 2019-10-02 RX ORDER — FLUTICASONE PROPIONATE 50 MCG
SPRAY, SUSPENSION (ML) NASAL
Qty: 16 G | Refills: 6 | Status: SHIPPED | OUTPATIENT
Start: 2019-10-02 | End: 2020-04-22

## 2019-10-03 ENCOUNTER — MYC REFILL (OUTPATIENT)
Dept: INTERNAL MEDICINE | Facility: CLINIC | Age: 74
End: 2019-10-03

## 2019-10-03 DIAGNOSIS — Z91.09 ENVIRONMENTAL ALLERGIES: ICD-10-CM

## 2019-10-04 RX ORDER — FLUTICASONE PROPIONATE 50 MCG
SPRAY, SUSPENSION (ML) NASAL
Qty: 16 G | Refills: 6
Start: 2019-10-04

## 2019-12-08 ENCOUNTER — MYC MEDICAL ADVICE (OUTPATIENT)
Dept: FAMILY MEDICINE | Facility: CLINIC | Age: 74
End: 2019-12-08

## 2019-12-09 ENCOUNTER — OFFICE VISIT (OUTPATIENT)
Dept: OPHTHALMOLOGY | Facility: CLINIC | Age: 74
End: 2019-12-09
Payer: COMMERCIAL

## 2019-12-09 DIAGNOSIS — H02.883 MEIBOMIAN GLAND DYSFUNCTION (MGD) OF BOTH EYES: ICD-10-CM

## 2019-12-09 DIAGNOSIS — H52.13 MYOPIA OF BOTH EYES: ICD-10-CM

## 2019-12-09 DIAGNOSIS — H04.123 DRY EYE SYNDROME, BILATERAL: ICD-10-CM

## 2019-12-09 DIAGNOSIS — H52.4 PRESBYOPIA OF BOTH EYES: ICD-10-CM

## 2019-12-09 DIAGNOSIS — H52.223 REGULAR ASTIGMATISM OF BOTH EYES: ICD-10-CM

## 2019-12-09 DIAGNOSIS — H04.121 DRY EYE OF RIGHT SIDE: ICD-10-CM

## 2019-12-09 DIAGNOSIS — H35.3131 EARLY DRY STAGE NONEXUDATIVE AGE-RELATED MACULAR DEGENERATION OF BOTH EYES: ICD-10-CM

## 2019-12-09 DIAGNOSIS — H25.13 NUCLEAR SCLEROTIC CATARACT OF BOTH EYES: ICD-10-CM

## 2019-12-09 DIAGNOSIS — Z01.00 EXAMINATION OF EYES AND VISION: Primary | ICD-10-CM

## 2019-12-09 DIAGNOSIS — H43.813 PVD (POSTERIOR VITREOUS DETACHMENT), BOTH EYES: ICD-10-CM

## 2019-12-09 DIAGNOSIS — H02.886 MEIBOMIAN GLAND DYSFUNCTION (MGD) OF BOTH EYES: ICD-10-CM

## 2019-12-09 PROCEDURE — 92015 DETERMINE REFRACTIVE STATE: CPT | Performed by: STUDENT IN AN ORGANIZED HEALTH CARE EDUCATION/TRAINING PROGRAM

## 2019-12-09 PROCEDURE — 92014 COMPRE OPH EXAM EST PT 1/>: CPT | Performed by: STUDENT IN AN ORGANIZED HEALTH CARE EDUCATION/TRAINING PROGRAM

## 2019-12-09 ASSESSMENT — REFRACTION_WEARINGRX
SPECS_TYPE: PAL
OD_CYLINDER: +1.00
OS_SPHERE: -2.00
OD_ADD: +2.50
OD_SPHERE: -1.75
OD_AXIS: 020
OS_ADD: +2.50
OS_CYLINDER: +0.25
OS_AXIS: 017

## 2019-12-09 ASSESSMENT — TONOMETRY
OD_IOP_MMHG: 17
OS_IOP_MMHG: 12
IOP_METHOD: APPLANATION

## 2019-12-09 ASSESSMENT — VISUAL ACUITY
CORRECTION_TYPE: GLASSES
OS_CC: J
OS_CC: 20/20
OD_CC: J1
OD_CC: 20/25
METHOD: SNELLEN - LINEAR

## 2019-12-09 ASSESSMENT — REFRACTION_MANIFEST
OS_ADD: +3.00
OD_ADD: +3.00
OS_AXIS: 015
OD_AXIS: 010
OS_SPHERE: -2.00
OD_SPHERE: -1.75
OD_CYLINDER: +1.00
OS_CYLINDER: +0.25

## 2019-12-09 ASSESSMENT — CONF VISUAL FIELD
OS_NORMAL: 1
OD_NORMAL: 1

## 2019-12-09 ASSESSMENT — CUP TO DISC RATIO
OS_RATIO: 0.5
OD_RATIO: 0.5

## 2019-12-09 ASSESSMENT — EXTERNAL EXAM - RIGHT EYE: OD_EXAM: NORMAL

## 2019-12-09 ASSESSMENT — EXTERNAL EXAM - LEFT EYE: OS_EXAM: NORMAL

## 2019-12-09 ASSESSMENT — SLIT LAMP EXAM - LIDS
COMMENTS: 1+ MEIBOMIAN GLAND DYSFUNCTION
COMMENTS: 1+ MEIBOMIAN GLAND DYSFUNCTION

## 2019-12-09 NOTE — LETTER
12/9/2019         RE: Chi Newman  [address] [place] [address]  [place] [address]        Dear Colleague,    Thank you for referring your patient, Chi Newman, to the [facility]. Please see a copy of my visit note below.     Current Eye Medications:  Tears three times a day.     Subjective:  Comprehensive eye exam.   Pt reports he is seeing well left eye , however the floaters in his right eye make his vision seem blurry. Pt states also has floaters in the left eye but not as bothersome as in his right eye. Denies eye pain or discomfort.     Objective:  See Ophthalmology Exam.      Assessment:  Chi Newman is a 74 year old male who presents with:   Encounter Diagnoses   Name Primary?     Examination of eyes and vision      Myopia of both eyes      Regular astigmatism of both eyes      Presbyopia of both eyes        Early dry stage nonexudative age-related macular degeneration of both eyes Mild both eyes. Discussed AREDS2 eye vitamins.      Nuclear sclerotic cataract of both eyes Not visually significant      PVD (posterior vitreous detachment), both eyes      Dry eye of right side      Meibomian gland dysfunction (MGD) of both eyes      Dry eye syndrome, bilateral        Plan:  Continue artificial tears up to four times a day as needed    Glasses prescription given - optional to update    Recommend taking an eye vitamin with AREDS2 on the packaging (like Preservision, iCaps, or generic). Follow dosing directions on the package.     Connie Martinez MD  [phone]        Again, thank you for allowing me to participate in the care of your patient.        Sincerely,        Connie Martinez MD

## 2019-12-09 NOTE — PROGRESS NOTES
Current Eye Medications:  Tears three times a day.     Subjective:  Comprehensive eye exam.   Pt reports he is seeing well left eye , however the floaters in his right eye make his vision seem blurry. Pt states also has floaters in the left eye but not as bothersome as in his right eye. Denies eye pain or discomfort.     Objective:  See Ophthalmology Exam.      Assessment:  Chi Newman is a 74 year old male who presents with:   Encounter Diagnoses   Name Primary?     Examination of eyes and vision      Myopia of both eyes      Regular astigmatism of both eyes      Presbyopia of both eyes        Early dry stage nonexudative age-related macular degeneration of both eyes Mild both eyes. Discussed AREDS2 eye vitamins.      Nuclear sclerotic cataract of both eyes Not visually significant      PVD (posterior vitreous detachment), both eyes      Dry eye of right side      Meibomian gland dysfunction (MGD) of both eyes      Dry eye syndrome, bilateral        Plan:  Continue artificial tears up to four times a day as needed    Glasses prescription given - optional to update    Recommend taking an eye vitamin with AREDS2 on the packaging (like Preservision, iCaps, or generic). Follow dosing directions on the package.     Connie Martinez MD  [phone]

## 2019-12-09 NOTE — PATIENT INSTRUCTIONS
Continue artificial tears up to four times a day as needed    Glasses prescription given - optional to update    Recommend taking an eye vitamin with AREDS2 on the packaging (like Preservision, iCaps, or generic). Follow dosing directions on the package.     Connie Martinez MD  [phone]

## 2020-01-10 ASSESSMENT — ENCOUNTER SYMPTOMS
COUGH: 0
NERVOUS/ANXIOUS: 1
MYALGIAS: 1
HEMATOCHEZIA: 0
WEAKNESS: 0
PALPITATIONS: 0
CHILLS: 0
PARESTHESIAS: 0
ARTHRALGIAS: 1
HEADACHES: 0
CONSTIPATION: 1
DIARRHEA: 1
DIZZINESS: 0
JOINT SWELLING: 0
FREQUENCY: 0
ABDOMINAL PAIN: 0
SHORTNESS OF BREATH: 0
SORE THROAT: 0
NAUSEA: 1
DYSURIA: 0
FEVER: 0
EYE PAIN: 1
HEMATURIA: 0
HEARTBURN: 1

## 2020-01-10 ASSESSMENT — ACTIVITIES OF DAILY LIVING (ADL): CURRENT_FUNCTION: NO ASSISTANCE NEEDED

## 2020-01-13 ENCOUNTER — OFFICE VISIT (OUTPATIENT)
Dept: INTERNAL MEDICINE | Facility: CLINIC | Age: 75
End: 2020-01-13
Payer: COMMERCIAL

## 2020-01-13 VITALS
OXYGEN SATURATION: 100 % | HEIGHT: 65 IN | HEART RATE: 71 BPM | TEMPERATURE: 97.6 F | DIASTOLIC BLOOD PRESSURE: 70 MMHG | RESPIRATION RATE: 14 BRPM | SYSTOLIC BLOOD PRESSURE: 124 MMHG | WEIGHT: 169 LBS | BODY MASS INDEX: 28.16 KG/M2

## 2020-01-13 DIAGNOSIS — H90.6 MIXED CONDUCTIVE AND SENSORINEURAL HEARING LOSS OF BOTH EARS: ICD-10-CM

## 2020-01-13 DIAGNOSIS — K57.32 DIVERTICULITIS OF COLON: ICD-10-CM

## 2020-01-13 DIAGNOSIS — Z12.5 SCREENING FOR PROSTATE CANCER: ICD-10-CM

## 2020-01-13 DIAGNOSIS — Z00.00 MEDICARE ANNUAL WELLNESS VISIT, SUBSEQUENT: Primary | ICD-10-CM

## 2020-01-13 DIAGNOSIS — R97.20 ELEVATED PROSTATE SPECIFIC ANTIGEN (PSA): ICD-10-CM

## 2020-01-13 DIAGNOSIS — R73.09 ELEVATED GLUCOSE: ICD-10-CM

## 2020-01-13 DIAGNOSIS — E55.9 HYPOVITAMINOSIS D: ICD-10-CM

## 2020-01-13 DIAGNOSIS — Z23 NEED FOR PROPHYLACTIC VACCINATION AND INOCULATION AGAINST INFLUENZA: ICD-10-CM

## 2020-01-13 DIAGNOSIS — E78.5 HYPERLIPIDEMIA WITH TARGET LDL LESS THAN 130: ICD-10-CM

## 2020-01-13 LAB
ANION GAP SERPL CALCULATED.3IONS-SCNC: 5 MMOL/L (ref 3–14)
BUN SERPL-MCNC: 14 MG/DL (ref 7–30)
CALCIUM SERPL-MCNC: 9 MG/DL (ref 8.5–10.1)
CHLORIDE SERPL-SCNC: 108 MMOL/L (ref 94–109)
CHOLEST SERPL-MCNC: 159 MG/DL
CO2 SERPL-SCNC: 25 MMOL/L (ref 20–32)
CREAT SERPL-MCNC: 0.87 MG/DL (ref 0.66–1.25)
GFR SERPL CREATININE-BSD FRML MDRD: 86 ML/MIN/{1.73_M2}
GLUCOSE SERPL-MCNC: 89 MG/DL (ref 70–99)
HBA1C MFR BLD: 5.4 % (ref 0–5.6)
HDLC SERPL-MCNC: 39 MG/DL
LDLC SERPL CALC-MCNC: 82 MG/DL
NONHDLC SERPL-MCNC: 120 MG/DL
POTASSIUM SERPL-SCNC: 4.3 MMOL/L (ref 3.4–5.3)
PSA SERPL-ACNC: 10.5 UG/L (ref 0–4)
SODIUM SERPL-SCNC: 138 MMOL/L (ref 133–144)
TRIGL SERPL-MCNC: 189 MG/DL

## 2020-01-13 PROCEDURE — 99213 OFFICE O/P EST LOW 20 MIN: CPT | Mod: 25 | Performed by: INTERNAL MEDICINE

## 2020-01-13 PROCEDURE — G0103 PSA SCREENING: HCPCS | Performed by: INTERNAL MEDICINE

## 2020-01-13 PROCEDURE — G0008 ADMIN INFLUENZA VIRUS VAC: HCPCS | Performed by: INTERNAL MEDICINE

## 2020-01-13 PROCEDURE — 83036 HEMOGLOBIN GLYCOSYLATED A1C: CPT | Performed by: INTERNAL MEDICINE

## 2020-01-13 PROCEDURE — 80061 LIPID PANEL: CPT | Performed by: INTERNAL MEDICINE

## 2020-01-13 PROCEDURE — 80048 BASIC METABOLIC PNL TOTAL CA: CPT | Performed by: INTERNAL MEDICINE

## 2020-01-13 PROCEDURE — 82306 VITAMIN D 25 HYDROXY: CPT | Performed by: INTERNAL MEDICINE

## 2020-01-13 PROCEDURE — 36415 COLL VENOUS BLD VENIPUNCTURE: CPT | Performed by: INTERNAL MEDICINE

## 2020-01-13 PROCEDURE — 90686 IIV4 VACC NO PRSV 0.5 ML IM: CPT | Performed by: INTERNAL MEDICINE

## 2020-01-13 PROCEDURE — 99397 PER PM REEVAL EST PAT 65+ YR: CPT | Mod: 25 | Performed by: INTERNAL MEDICINE

## 2020-01-13 ASSESSMENT — ENCOUNTER SYMPTOMS
DIZZINESS: 0
FEVER: 0
HEARTBURN: 1
MYALGIAS: 1
HEMATOCHEZIA: 0
DIARRHEA: 1
CHILLS: 0
PARESTHESIAS: 0
HEADACHES: 0
SORE THROAT: 0
DYSURIA: 0
NAUSEA: 1
FREQUENCY: 0
PALPITATIONS: 0
ARTHRALGIAS: 1
CONSTIPATION: 1
JOINT SWELLING: 0
ABDOMINAL PAIN: 0
NERVOUS/ANXIOUS: 1
HEMATURIA: 0
COUGH: 0
WEAKNESS: 0
EYE PAIN: 1
SHORTNESS OF BREATH: 0

## 2020-01-13 ASSESSMENT — PAIN SCALES - GENERAL: PAINLEVEL: NO PAIN (0)

## 2020-01-13 ASSESSMENT — ACTIVITIES OF DAILY LIVING (ADL): CURRENT_FUNCTION: NO ASSISTANCE NEEDED

## 2020-01-13 ASSESSMENT — MIFFLIN-ST. JEOR: SCORE: 1431.58

## 2020-01-13 NOTE — PATIENT INSTRUCTIONS
Follow-up with dermatology for a routine skin check and possible removal of lesions if desired and or recommended.     We will recheck th cholesterol numbers today and decide how to go ahead with this given the fact you are off the statin.     If your PSA test comes back abnormal we will decide what to do next and if follow-up with urology is appropriate.     Zantac, nexium, omeprazole, prevacid are some examples of medications for heartburn.

## 2020-01-13 NOTE — PROGRESS NOTES
"SUBJECTIVE:   Chi Newman is a 75 year old male who presents for Preventive Visit.  Are you in the first 12 months of your Medicare coverage?  No    Healthy Habits:     In general, how would you rate your overall health?  Good    Frequency of exercise:  2-3 days/week    Duration of exercise:  15-30 minutes    Do you usually eat at least 4 servings of fruit and vegetables a day, include whole grains    & fiber and avoid regularly eating high fat or \"junk\" foods?  Yes    Taking medications regularly:  No    Barriers to taking medications:  Side effects    Medication side effects:  Muscle aches, Lightheadedness and Other    Ability to successfully perform activities of daily living:  No assistance needed    Home Safety:  No safety concerns identified    Hearing Impairment:  Difficulty following a conversation in a noisy restaurant or crowded room and need to ask people to speak up or repeat themselves    In the past 6 months, have you been bothered by leaking of urine?  No    In general, how would you rate your overall mental or emotional health?  Good      PHQ-2 Total Score: 0    Additional concerns today:  Yes    Do you feel safe in your environment? Yes    Have you ever done Advance Care Planning? (For example, a Health Directive, POLST, or a discussion with a medical provider or your loved ones about your wishes): No, advance care planning information given to patient to review.  Advanced care planning was discussed at today's visit.    Fall risk  Fallen 2 or more times in the past year?: No  Any fall with injury in the past year?: No    Cognitive Screening   1) Repeat 3 items (Leader, Season, Table)    2) Clock draw: NORMAL   3) 3 item recall: Recalls 2 objects   Results: NORMAL clock, 1-2 items recalled:  COGNITIVE IMPAIRMENT LESS LIKELY**    Mini-CogTM Copyright S Alaiyah. Licensed by the author for use in [facility]; reprinted with permission ([email or web address]). All rights reserved.      Do you have " "sleep apnea, excessive snoring or daytime drowsiness?: no    He did not have a flu vaccine, he thinks that he might have had some flu symptoms, otherwise this was some of what he calls \"irritable bowel syndrome\" exacerbation. Also had some minor abdominal pain with recent diarrhea. Generally errs on the side of constipation and tries to follow a high fiber diet.     Has been off his statin medication for about 10 months or so he estimates. He had side effects of muscle aches and headaches so when he stopped this medicine these symptoms resolved for him.     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 Had a left ear infection when he was younger and since had some hearing changes he believes. Plans to follow-up with audiology.     Often urinates a few times a night. Has a steady urine flow.     Reviewed and updated as needed this visit by clinical staff  Tobacco  Allergies  Meds       Reviewed and updated as needed this visit by Provider        Social History     Tobacco Use     Smoking status: Never Smoker     Smokeless tobacco: Never Used   Substance Use Topics     Alcohol use: Yes     Alcohol/week: 0.0 standard drinks     Comment: Occasional glass of wine and beer.     Alcohol Use 1/10/2020   Prescreen: >3 drinks/day or >7 drinks/week? No   Prescreen: >3 drinks/day or >7 drinks/week? -     Current providers sharing in care for this patient include:   Patient Care Team:  Isaac Knutson MD as PCP - General (Internal Medicine)  Isaac Knutson MD as Assigned PCP  Fawad Robles MD (Ophthalmology)    The following health maintenance items are reviewed in Epic and correct as of today:  Health Maintenance   Topic Date Due     AORTIC ANEURYSM SCREENING (SYSTEM ASSIGNED)  01/06/2012     INFLUENZA VACCINE (1) 09/01/2019     MEDICARE ANNUAL WELLNESS VISIT  11/29/2019     LIPID  11/29/2019     FALL RISK ASSESSMENT  11/29/2019     EYE EXAM  12/09/2020     ADVANCE CARE PLANNING  " 12/11/2023     COLONOSCOPY  01/23/2028     DTAP/TDAP/TD IMMUNIZATION (3 - Td) 11/29/2028     HEPATITIS C SCREENING  Completed     PHQ-2  Completed     PNEUMOCOCCAL IMMUNIZATION 65+ LOW/MEDIUM RISK  Completed     ZOSTER IMMUNIZATION  Completed     IPV IMMUNIZATION  Aged Out     MENINGITIS IMMUNIZATION  Aged Out     BP Readings from Last 3 Encounters:   01/13/20 124/70   08/01/19 104/56   04/12/19 126/70    Wt Readings from Last 3 Encounters:   01/13/20 76.7 kg (169 lb)   08/01/19 80.3 kg (177 lb)   04/12/19 79.8 kg (176 lb)           Patient Active Problem List   Diagnosis     Health Care Home     Hyperlipidemia with target LDL less than 130     Diverticulosis of colon     Elevated liver enzymes     Hypovitaminosis D     Environmental allergies     Seasonal allergic rhinitis     Pneumonia     BPH (benign prostatic hyperplasia)     Diverticulitis of colon     Chronic nonalcoholic liver disease     Microscopic hematuria     Umbilical hernia     Elevated glucose     Elevated prostate specific antigen (PSA)     Drusen of macula of both eyes     Posterior vitreous detachment of right eye     Nonexudative senile macular degeneration of retina     Senile nuclear sclerosis, bilateral     Slow transit constipation     Vitreous syneresis of left eye     PVD (posterior vitreous detachment), both eyes     History of diverticulitis of colon     History of partial colectomy     Meibomian gland dysfunction (MGD) of both eyes     Dry eye of right side     Past Surgical History:   Procedure Laterality Date     ABDOMEN SURGERY      Diverticulitus     ARTHROSCOPY KNEE  7/12/96    right knee, LMT     ARTHROSCOPY KNEE  11/1988    left knee, MMT     BIOPSY      Colonoscopy / Endoscopy     COLONOSCOPY  10/7/2002    diverticulosis, due again in 2012     COLONOSCOPY  7/1/2013     SMALL BOWEL RESECTION  3/19/2014    small bowel resection, lysis of adhesions and pelvic drain placement     TONSILLECTOMY & ADENOIDECTOMY  age of 15       Social  "History     Tobacco Use     Smoking status: Never Smoker     Smokeless tobacco: Never Used   Substance Use Topics     Alcohol use: Yes     Alcohol/week: 0.0 standard drinks     Comment: Occasional glass of wine and beer.     Family History   Problem Relation Age of Onset     Cancer Mother         Colon Cancer     Colon Cancer Mother          of Colon/Intestinal cancer     Diabetes Brother         My brother  from complications of diabetes     Glaucoma No family hx of      Macular Degeneration No family hx of          Review of Systems   Constitutional: Negative for chills and fever.   HENT: Positive for hearing loss. Negative for congestion, ear pain and sore throat.    Eyes: Positive for pain and visual disturbance.   Respiratory: Negative for cough and shortness of breath.    Cardiovascular: Negative for chest pain, palpitations and peripheral edema.   Gastrointestinal: Positive for constipation, diarrhea, heartburn and nausea. Negative for abdominal pain and hematochezia.   Genitourinary: Negative for discharge, dysuria, frequency, genital sores, hematuria, impotence and urgency.   Musculoskeletal: Positive for arthralgias and myalgias. Negative for joint swelling.   Skin: Positive for rash.   Neurological: Negative for dizziness, weakness, headaches and paresthesias.   Psychiatric/Behavioral: Positive for mood changes. The patient is nervous/anxious.      This document serves as a record of the services and decisions personally performed and made by Isaac Knutson MD. It was created on his behalf by Adis Thurman, a trained medical scribe. The creation of this document is based on the provider's statements to the medical scribe.  Adis Thurman 1:43 PM 2020    OBJECTIVE:   /70   Pulse 71   Temp 97.6  F (36.4  C) (Oral)   Resp 14   Ht 1.64 m (5' 4.57\")   Wt 76.7 kg (169 lb)   SpO2 100%   BMI 28.50 kg/m   Estimated body mass index is 28.5 kg/m  as calculated from the following:    " "Height as of this encounter: 1.64 m (5' 4.57\").    Weight as of this encounter: 76.7 kg (169 lb).  Physical Exam  GENERAL: healthy, alert and no distress  EYES: Eyes grossly normal to inspection, PERRL and conjunctivae and sclerae normal  HENT: ear canals and TM's normal, nose and mouth without ulcers or lesions  NECK: no adenopathy, no asymmetry, masses, or scars and thyroid normal to palpation  RESP: lungs clear to auscultation - no rales, rhonchi or wheezes  CV: regular rate and rhythm, normal S1 S2, no S3 or S4, no murmur, click or rub, no peripheral edema and peripheral pulses strong  ABDOMEN: soft, nontender, no hepatosplenomegaly, no masses and bowel sounds normal  MS: no gross musculoskeletal defects noted, no edema  SKIN: large seborrheic keratosis at about the sternum with several scattered seborrheic keratoses on the abdomen and back.   NEURO: Normal strength and tone, mentation intact and speech normal  PSYCH: mentation appears normal, affect normal/bright    ASSESSMENT / PLAN:   (Z00.00) Medicare annual wellness visit, subsequent  (primary encounter diagnosis)  Comment: negative screening physical exam today, except for some benign skin lesions.     Plan:  Follow-up in 1 year      (E78.5) Hyperlipidemia with target LDL less than 130  Comment: not currently on statin, stopped almost a year ago  Plan: STATIN NOT PRESCRIBED (INTENTIONAL), Lipid         panel reflex to direct LDL Fasting        Follow-up with patient regarding results of labs.    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     (R73.09) Elevated glucose  Comment: hemoglobin A1C tests historically within normal limits   Plan: Basic metabolic panel, Hemoglobin A1c            (E55.9) Hypovitaminosis D  Comment: stable, recheck labs today.  Plan: Vitamin D Deficiency   " "         (K57.32) Diverticulitis of colon  Comment: prior instances of diverticulitis with slow transit constipation, recently erring on the side of diarrhea either with acute illness or possibly an Irritable bowel syndrome diagnosis, although this has not been given formally I don't think.  Plan: continue current treatment plan     (R97.20) Elevated prostate specific antigen (PSA)  Comment: prior mild elevation in PSA should recheck, otherwise not a whole lot of symptoms besides frequency of urination.  Plan: recheck labs today     (Z12.5) Screening for prostate cancer  Comment: as above   Plan: PSA, screen        As above       COUNSELING:  Reviewed preventive health counseling, as reflected in patient instructions  Special attention given to:       Regular exercise       Healthy diet/nutrition       Hearing screening       Colon cancer screening       Prostate cancer screening    Estimated body mass index is 28.5 kg/m  as calculated from the following:    Height as of this encounter: 1.64 m (5' 4.57\").    Weight as of this encounter: 76.7 kg (169 lb).     reports that he has never smoked. He has never used smokeless tobacco.    Appropriate preventive services were discussed with this patient, including applicable screening as appropriate for cardiovascular disease, diabetes, osteopenia/osteoporosis, and glaucoma.  As appropriate for age/gender, discussed screening for colorectal cancer, prostate cancer, breast cancer, and cervical cancer. Checklist reviewing preventive services available has been given to the patient.    Reviewed patients plan of care and provided an AVS. The Basic Care Plan (routine screening as documented in Health Maintenance) for Chi meets the Care Plan requirement. This Care Plan has been established and reviewed with the Patient.    Counseling Resources:  ATP IV Guidelines  Pooled Cohorts Equation Calculator  Breast Cancer Risk Calculator  FRAX Risk Assessment  ICSI Preventive " Guidelines  Dietary Guidelines for Americans, 2010  USDA's MyPlate  ASA Prophylaxis  Lung CA Screening    The information in this document, created by the medical scribe for me, accurately reflects the services I personally performed and the decisions made by me. I have reviewed and approved this document for accuracy prior to leaving the patient care area.  January 13, 2020 2:07 PM     Isaac Knutson MD  [facility]    Identified Health Risks:

## 2020-01-14 LAB — DEPRECATED CALCIDIOL+CALCIFEROL SERPL-MC: 22 UG/L (ref 20–75)

## 2020-02-03 ENCOUNTER — MYC MEDICAL ADVICE (OUTPATIENT)
Dept: INTERNAL MEDICINE | Facility: CLINIC | Age: 75
End: 2020-02-03

## 2020-02-03 NOTE — TELEPHONE ENCOUNTER
Replied to pt via [employer] with questions regarding symptoms. Appt scheduled.    Rachel Ledesma RN  [facility]

## 2020-02-04 ENCOUNTER — OFFICE VISIT (OUTPATIENT)
Dept: INTERNAL MEDICINE | Facility: CLINIC | Age: 75
End: 2020-02-04
Payer: COMMERCIAL

## 2020-02-04 VITALS
BODY MASS INDEX: 29.18 KG/M2 | SYSTOLIC BLOOD PRESSURE: 136 MMHG | RESPIRATION RATE: 18 BRPM | TEMPERATURE: 98.7 F | OXYGEN SATURATION: 100 % | WEIGHT: 173 LBS | HEART RATE: 78 BPM | DIASTOLIC BLOOD PRESSURE: 68 MMHG

## 2020-02-04 DIAGNOSIS — L25.9 CONTACT DERMATITIS AND ECZEMA: Primary | ICD-10-CM

## 2020-02-04 PROCEDURE — 99213 OFFICE O/P EST LOW 20 MIN: CPT | Performed by: INTERNAL MEDICINE

## 2020-02-04 RX ORDER — TRIAMCINOLONE ACETONIDE 1 MG/G
CREAM TOPICAL 2 TIMES DAILY
Qty: 453.6 G | Refills: 0 | Status: SHIPPED | OUTPATIENT
Start: 2020-02-04 | End: 2023-07-14

## 2020-02-04 ASSESSMENT — PAIN SCALES - GENERAL: PAINLEVEL: MILD PAIN (3)

## 2020-02-04 NOTE — PROGRESS NOTES
Subjective     Chi Newman is a 75 year old male who presents to clinic today for the following health issues:    Rash on the extremities, back, abdomen, hips, but, and thighs onset roughly 3 days ago and described as round, blotchy, burning, red, painful, and pruritic. No fever or other systemic sx noted like sore throat or body aches. No recent illnesses. No new medications introduced. No new detergents or soaps but patient was suspicious of detergent so re-washed clothes in non-scented detergent. After doing this rash has got better in regards to itching, discomfort, and redness but is still very pruritic. He also has been using gold bond cream and tylenol, which have helped some but rash has not resolved. Patient denies any previous similar rashes in the past but has had eczema like mild rashes, never like today's rash, due to dry skin. No recent travel or exposures.     Patient Active Problem List   Diagnosis     Health Care Home     Hyperlipidemia with target LDL less than 130     Diverticulosis of colon     Elevated liver enzymes     Hypovitaminosis D     Environmental allergies     Seasonal allergic rhinitis     Pneumonia     BPH (benign prostatic hyperplasia)     Diverticulitis of colon     Chronic nonalcoholic liver disease     Microscopic hematuria     Umbilical hernia     Elevated glucose     Elevated prostate specific antigen (PSA)     Drusen of macula of both eyes     Posterior vitreous detachment of right eye     Nonexudative senile macular degeneration of retina     Senile nuclear sclerosis, bilateral     Slow transit constipation     Vitreous syneresis of left eye     PVD (posterior vitreous detachment), both eyes     History of diverticulitis of colon     History of partial colectomy     Meibomian gland dysfunction (MGD) of both eyes     Dry eye of right side     Past Surgical History:   Procedure Laterality Date     ABDOMEN SURGERY      Diverticulitus     ARTHROSCOPY KNEE  7/12/96    right  knee, LMT     ARTHROSCOPY KNEE  1988    left knee, MMT     BIOPSY      Colonoscopy / Endoscopy     COLONOSCOPY  10/7/2002    diverticulosis, due again in      COLONOSCOPY  2013     SMALL BOWEL RESECTION  3/19/2014    small bowel resection, lysis of adhesions and pelvic drain placement     TONSILLECTOMY & ADENOIDECTOMY  age of 15       Social History     Tobacco Use     Smoking status: Never Smoker     Smokeless tobacco: Never Used   Substance Use Topics     Alcohol use: Yes     Alcohol/week: 0.0 standard drinks     Comment: Occasional glass of wine and beer.     Family History   Problem Relation Age of Onset     Cancer Mother         Colon Cancer     Colon Cancer Mother          of Colon/Intestinal cancer     Diabetes Brother         My brother  from complications of diabetes     Glaucoma No family hx of      Macular Degeneration No family hx of          Reviewed and updated as needed this visit by Provider         Review of Systems   ROS COMP: Constitutional, HEENT, cardiovascular, pulmonary, gi and gu systems are negative, except as otherwise noted.      Objective    There were no vitals taken for this visit.  Body mass index is 29.18 kg/m .  Physical Exam   GENERAL: healthy, alert and no distress  RESP: lungs clear to auscultation - no rales, rhonchi or wheezes  CV: regular rate and rhythm, normal S1 S2, no S3 or S4, no murmur, click or rub, no peripheral edema and peripheral pulses strong  ABDOMEN: soft, nontender, no hepatosplenomegaly, no masses and bowel sounds normal  MS: no gross musculoskeletal defects noted, no edema  SKIN: Multiple light pink to skin colored patches of various sizes on the upper and lower extremities, lower abdomen, and back. Largest patch being roughly 5x6cm on right lower abdomen and most being circular and 2x2cm. Rash in blanchable and not raised.   NEURO: Normal strength and tone, mentation intact and speech normal  PSYCH: mentation appears normal, affect  normal/bright    Diagnostic Test Results:  Labs reviewed in Epic  none     No labs ordered.         Assessment & Plan     (L25.9) Contact dermatitis and eczema  (primary encounter diagnosis)  Comment: Likely due to some form of allergy - possibly detergent. No concern for systemic root cause at this time.   Plan:   Start triamcinolone 0.1% cream each day x 14 days.  Continue to take OTC antihistamine (Zyrtec, Claritin, or Allegra) - double dosage (up to 4 pills in 24 hour period) at night as tolerated. Take until rash better - can cause dry mouth, dry eyes, and constipation.   Continue with fragrance-free detergent.   Moisturize skin well each day. CereVe recommended. Limit bathes and showers to prevent dry skin.         Return in about 1 week (around 2/11/2020) for If not improving.    DEEJAY Roman    This patient office visit today was staffed with me, I did review the entire clinical presentation and history, exam and medical decision making with physicians assistant student DEEJAY Roman  . I agree with and have approved the office visit entirely. Patient seen with me and DEEJAY Roman   together today. I agree with assessment and plans.     Isaac Knutson MD  [facility]

## 2020-02-04 NOTE — PATIENT INSTRUCTIONS
Antihistamine (Zyrtec, Claritin, or Allegra) - double dosage (up to 4 pills in 24 hour period). Take until rash better - can cause dry mouth, dry eyes, and constipation.     Start triamcinolone cream each day x 14 days.     Continue with fragrance detergent.     Moisturize skin well each day. Limit bathes and showers. CereVe recommended.

## 2020-02-11 ENCOUNTER — MYC REFILL (OUTPATIENT)
Dept: INTERNAL MEDICINE | Facility: CLINIC | Age: 75
End: 2020-02-11

## 2020-02-11 DIAGNOSIS — R79.89 LOW VITAMIN D LEVEL: ICD-10-CM

## 2020-03-02 ENCOUNTER — NURSE TRIAGE (OUTPATIENT)
Dept: FAMILY MEDICINE | Facility: CLINIC | Age: 75
End: 2020-03-02

## 2020-03-02 NOTE — TELEPHONE ENCOUNTER
Reason for call:  Other     Patient called regarding (reason for call): Red Flag     Additional comments: Patient calling stating that he is having severe dizzy spells and is breathing super hard on the phone, reached out to the RN line, think he's about to faint. Patient states he's heading over to [place] for the emergency room. Please call to advise.     Phone number to reach patient:  Home number on file [phone] (home)    Best Time:  Any     Can we leave a detailed message on this number?  YES    Travel screening: Not Applicable

## 2020-03-02 NOTE — TELEPHONE ENCOUNTER
Called patient after being lynced by reception that pt was calling clinic for symptoms of severe dizzy spells, panting hard on the phone, and feels like he's about to faint.     Pt states that he woke up with neck and back pain. The pain is located between his shoulders.   Pt sounds SOB over the phone. Pt states that he is not having trouble breathing now, but earlier this morning he was. He declines any chest pain.  States that he only has pain in back of neck and in between shoulders. It is like a burning pain that comes and goes. Right now it is a 4 or 5/10, but earlier was a 6/10.   Pt feels slightly nauseous. He declines any vomiting. Declines any feeling of sweaty, cool, or clammy skin.  Pt then stated that he gets winded with any exertion. He also reports dizziness.     Writer advised pt to go into the ER immediately for assessment. Advised that pt conact a friend or call a cab to drive him and that he NOT drive himself due to concern for his safety. Pt stated that he can drive and he will bring himself into the ER. Writer re-iterated that we recommend that he have someone drive him for his safety. Pt again stated that he can drive himself. Pt states that he plans to go to [facility] emergency room.     Additional Information    Difficulty breathing or unusual sweating (e.g., sweating without exertion)    MODERATE neck pain (e.g., interferes with normal activities like work or school)    Protocols used: NECK PAIN OR [id number]

## 2020-03-06 ENCOUNTER — MYC MEDICAL ADVICE (OUTPATIENT)
Dept: INTERNAL MEDICINE | Facility: CLINIC | Age: 75
End: 2020-03-06

## 2020-03-06 DIAGNOSIS — Z76.89 HEALTH CARE HOME: Primary | ICD-10-CM

## 2020-03-09 ENCOUNTER — PATIENT OUTREACH (OUTPATIENT)
Dept: CARE COORDINATION | Facility: CLINIC | Age: 75
End: 2020-03-09

## 2020-03-09 NOTE — PROGRESS NOTES
Scheduled Follow Up Call: Attempt 1 Community Health Worker called and left a message for the patient. If the patient is returning my call, please transfer the patient to Nicole DEL ANGEL at [phone].            Reason for Referral: Care Transition: Inpatient to outpatient    Additional pertinent details: [facility] discharge referral list. Patient was recently hospitalized for GI bleed, anemia.      Notes:  -Patient was discharged on 3/4/20  -Patient needs to schedule hospital follow up with PCP after completing his swallow test.

## 2020-03-10 ENCOUNTER — PATIENT OUTREACH (OUTPATIENT)
Dept: CARE COORDINATION | Facility: CLINIC | Age: 75
End: 2020-03-10

## 2020-03-10 NOTE — PROGRESS NOTES
Community Health Worker called and left a message for the patient.  If the patient is returning my call, please transfer the patient to Nicole DEL ANGEL at [phone]. Patient has been mailed a unreachable letter and was provided with CHW contact information if they are interested in accessing Clinic Care Coordination. Order for Care Management has been closed, no further outreach will be done at this time and patient can be re-referred.           Reason for Referral: Care Transition: Inpatient to outpatient    Additional pertinent details: [facility] discharge referral list. Patient was recently hospitalized for GI bleed, anemia.      Notes:  -Patient was discharged on 3/4/20  -Patient needs to schedule hospital follow up with PCP after completing his swallow test.

## 2020-03-10 NOTE — LETTER
[place] CARE COORDINATION    March 10, 2020    Chi Newman  [address] [address]  [place] [address]      Dear Chi,    I am a clinic care coordinator who works with Isaac Knutson MD at [facility]. I have been trying to reach you recently to introduce Clinic Care Coordination and to see if there was anything I could assist you with.  Below is a description of clinic care coordination and how I can further assist you.      The clinic care coordinator team is made up of a registered nurse,  and community health worker who understand the health care system. The goal of clinic care coordination is to help you manage your health and improve access to the health care system in the most efficient manner. The team can assist you in meeting your health care goals by providing education, coordinating services, strengthening the communication among your providers  and supporting you with any resource needs.    Please feel free to contact me, at [phone] with any questions or concerns. We are focused on providing you with the highest-quality healthcare experience possible and that all starts with you.     Sincerely,     Nicole Short  [facility] Health Worker   [facility]  [email or web address]   Office: [phone]  Fax: [phone]

## 2020-03-10 NOTE — PROGRESS NOTES
The Clinic Community Health Worker spoke with  the patient today to discuss possible Clinic Care Coordination enrollment.  The service was described to the patient and immediate needs were discussed.  The patient agreed to enrollment and an assessment was scheduled.  The patient was provided with contact information for the clinic CHW.             Assessment date: 3/11/20 at 1130am for a phone assessment with CC RN.     The patient called the CHW back and state that he would like to talk to the RN about his diet. He is looking at getting his diet back and getting his strength back.

## 2020-03-11 ENCOUNTER — PATIENT OUTREACH (OUTPATIENT)
Dept: NURSING | Facility: CLINIC | Age: 75
End: 2020-03-11
Payer: COMMERCIAL

## 2020-03-11 ENCOUNTER — PATIENT OUTREACH (OUTPATIENT)
Dept: CARE COORDINATION | Facility: CLINIC | Age: 75
End: 2020-03-11

## 2020-03-11 SDOH — ECONOMIC STABILITY: TRANSPORTATION INSECURITY
IN THE PAST 12 MONTHS, HAS LACK OF TRANSPORTATION KEPT YOU FROM MEETINGS, WORK, OR FROM GETTING THINGS NEEDED FOR DAILY LIVING?: NO

## 2020-03-11 SDOH — ECONOMIC STABILITY: FOOD INSECURITY: WITHIN THE PAST 12 MONTHS, YOU WORRIED THAT YOUR FOOD WOULD RUN OUT BEFORE YOU GOT MONEY TO BUY MORE.: NEVER TRUE

## 2020-03-11 SDOH — HEALTH STABILITY: PHYSICAL HEALTH: ON AVERAGE, HOW MANY MINUTES DO YOU ENGAGE IN EXERCISE AT THIS LEVEL?: 40 MIN

## 2020-03-11 SDOH — HEALTH STABILITY: PHYSICAL HEALTH: ON AVERAGE, HOW MANY DAYS PER WEEK DO YOU ENGAGE IN MODERATE TO STRENUOUS EXERCISE (LIKE A BRISK WALK)?: 3 DAYS

## 2020-03-11 SDOH — ECONOMIC STABILITY: INCOME INSECURITY: HOW HARD IS IT FOR YOU TO PAY FOR THE VERY BASICS LIKE FOOD, HOUSING, MEDICAL CARE, AND HEATING?: NOT HARD AT ALL

## 2020-03-11 SDOH — ECONOMIC STABILITY: FOOD INSECURITY: WITHIN THE PAST 12 MONTHS, THE FOOD YOU BOUGHT JUST DIDN'T LAST AND YOU DIDN'T HAVE MONEY TO GET MORE.: NEVER TRUE

## 2020-03-11 SDOH — ECONOMIC STABILITY: TRANSPORTATION INSECURITY
IN THE PAST 12 MONTHS, HAS THE LACK OF TRANSPORTATION KEPT YOU FROM MEDICAL APPOINTMENTS OR FROM GETTING MEDICATIONS?: NO

## 2020-03-11 ASSESSMENT — ACTIVITIES OF DAILY LIVING (ADL): DEPENDENT_IADLS:: INDEPENDENT

## 2020-03-11 NOTE — PROGRESS NOTES
Clinic Care Coordination Contact    Clinic Care Coordination Contact  OUTREACH    Referral Information:  Referral Source: [facility]    Primary Diagnosis: GI Disorders    Chief Complaint   Patient presents with     Clinic Care Coordination - Initial     RN        Universal Utilization: patient was [facility] to 3/4/20 with UGIB with acute blood loss anemia  Clinic Utilization  Difficulty keeping appointments:: No  Compliance Concerns: No  No-Show Concerns: No  No PCP office visit in Past Year: No  Utilization    Last refreshed: 3/11/2020 11:56 AM:  Hospital Admissions 0           Last refreshed: 3/11/2020 11:56 AM:  ED Visits 0           Last refreshed: 3/11/2020 11:56 AM:  No Show Count (past year) 0              Current as of: 3/11/2020 11:56 AM          Clinical Concerns:    Current Medical Concerns:  Patient requested call from RN to discuss his diet.     Patent has a history of diverticulitis and has established an individual list of what he can and can not tolerate.  He states he eats a high fiber diet. States away from cabbage, tomatoes, grapefruit, oranges, brussell sprouts. Etc.  He does tolerate dairy. He states he loves tea. He drinks only decaf coffee as he is to stay away from caffeine. Discussed that tea has caffeine. He can attempt to find decaf tea.     Patient states he is still adjusting to the Protonix. He states he fees weak and has a dry mouth for about an hour after taking it in the morning. Then those symptoms pass. He does not always eat breakfast. We discussed eating breakfast even if it is only a small meal.  He states he has occasional diarrhea, usually just one episode.     Patient states he exercises every other day with walking a mile. Doing yoga or stretching exercises.      Patient remains very active in his community. 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 He states he is very independent.    Patient was seen in PCP office for a rash in the past month. He states this has cleared. He is trying to use \"organic\" detergents, shampoos, soaps.     Patient is to schedule a capsule swallow study with [facility]. They were not able to identify a bleed with endoscopy or colonoscopy. He states he sees Dr. Rosalie Murguia.           Current Behavioral Concerns: Denies concerns    Education Provided to patient: Role of care coordination. Diet. Explanation of capsule swallow study.       Pain  Pain (GOAL):: No    Health Maintenance Reviewed:  Health Maintenance Due   Topic Date Due     AORTIC ANEURYSM SCREENING (SYSTEM ASSIGNED)  01/06/2012    Due/Overdue   Health Maintenance Due   Topic Date Due     AORTIC ANEURYSM SCREENING (SYSTEM ASSIGNED)  01/06/2012     Clinical Pathway: None    Medication Management:  Patient is very knowledgeable about his medications, he has researched the actions of all of them Independent in management      Functional Status:  Dependent ADLs:: Independent  Dependent IADLs:: Independent  Bed or wheelchair confined:: No  Mobility Status: Independent  Fallen 2 or more times in the past year?: No  Any fall with injury in the past year?: No    Living Situation:  Current living arrangement:: I live alone, I live in a private home  Type of residence:: Private home - stairs    Lifestyle & Psychosocial Needs:  Lifestyle     Physical activity     Days per week: 3 days     Minutes per session: 40 min     Stress: Not on file     Social Needs     Financial resource strain: Not hard at all     Food insecurity     Worry: Never true     Inability: Never true     Transportation needs     Medical: No     Non-medical: No     Diet:: Other(High fiber/low acid)  Inadequate nutrition (GOAL):: No  Tube Feeding: No  Inadequate activity/exercise (GOAL):: No  Significant changes in sleep pattern (GOAL): No  Transportation means:: Accessible car, Regular car/ Patient drives   "   Cheondoism or spiritual beliefs that impact treatment:: No  Mental health DX:: No  Mental health management concern (GOAL):: No  Informal Support system:: Children   Socioeconomic History     Marital status: Single     Spouse name: Not on file     Number of children: 1     Years of education: Not on file     Highest education level: Not on file     Tobacco Use     Smoking status: Never Smoker     Smokeless tobacco: Never Used   Substance and Sexual Activity     Alcohol use: Yes     Alcohol/week: 0.0 standard drinks     Comment: Occasional glass of wine and beer.     Drug use: No     Sexual activity: Not Currently     Partners: Female     Birth control/protection: Condom     Resources and Interventions:  Current Resources: None     Community Resources: None     Equipment Currently Used at Home: none    Advance Care Plan/Directive  Advanced Care Plans/Directives on file:: Yes  Type Advanced Care Plans/Directives: Advanced Directive - On File    Referrals Placed: None  Patient/Caregiver understanding: Expresses god understanding   Future Appointments              In 9 months Connie Martinez MD [facility] [place] [facility]      Plan: Patient will scheduled with [facility] for capsule swallow study.     Patient feels his questions were answered and does not require care coordination follow up. He has RN care coordinator's contact information and will call if he has future questions or concerns.     Martha Lang RN, [facility] - Primary Care Clinic RN Coordinator  [facility]   3/11/2020    3:16 PM  [phone]

## 2020-03-23 ENCOUNTER — MYC MEDICAL ADVICE (OUTPATIENT)
Dept: INTERNAL MEDICINE | Facility: CLINIC | Age: 75
End: 2020-03-23

## 2020-03-23 DIAGNOSIS — Z90.49 HISTORY OF PARTIAL COLECTOMY: Primary | ICD-10-CM

## 2020-03-23 RX ORDER — PANTOPRAZOLE SODIUM 40 MG/1
40 TABLET, DELAYED RELEASE ORAL DAILY
Qty: 90 TABLET | Refills: 1 | Status: SHIPPED | OUTPATIENT
Start: 2020-03-23 | End: 2020-09-29

## 2020-04-14 ENCOUNTER — MYC MEDICAL ADVICE (OUTPATIENT)
Dept: INTERNAL MEDICINE | Facility: CLINIC | Age: 75
End: 2020-04-14

## 2020-04-21 PROBLEM — K25.9 GASTRIC EROSION, UNSPECIFIED CHRONICITY: Status: ACTIVE | Noted: 2020-04-21

## 2020-04-22 ENCOUNTER — TELEPHONE (OUTPATIENT)
Dept: FAMILY MEDICINE | Facility: CLINIC | Age: 75
End: 2020-04-22

## 2020-04-22 DIAGNOSIS — K25.9 GASTRIC EROSION, UNSPECIFIED CHRONICITY: ICD-10-CM

## 2020-04-22 DIAGNOSIS — Z91.09 ENVIRONMENTAL ALLERGIES: ICD-10-CM

## 2020-04-22 DIAGNOSIS — K57.32 DIVERTICULITIS OF COLON: ICD-10-CM

## 2020-04-22 DIAGNOSIS — K57.30 DIVERTICULOSIS OF COLON: ICD-10-CM

## 2020-04-22 DIAGNOSIS — Z90.49 HISTORY OF PARTIAL COLECTOMY: ICD-10-CM

## 2020-04-22 LAB
BASOPHILS # BLD AUTO: 0.1 10E9/L (ref 0–0.2)
BASOPHILS NFR BLD AUTO: 1.8 %
DIFFERENTIAL METHOD BLD: ABNORMAL
EOSINOPHIL # BLD AUTO: 0.2 10E9/L (ref 0–0.7)
EOSINOPHIL NFR BLD AUTO: 2.7 %
ERYTHROCYTE [DISTWIDTH] IN BLOOD BY AUTOMATED COUNT: 20.2 % (ref 10–15)
FERRITIN SERPL-MCNC: 3 NG/ML (ref 26–388)
HCT VFR BLD AUTO: 25.4 % (ref 40–53)
HGB BLD-MCNC: 7.4 G/DL (ref 13.3–17.7)
IRON SATN MFR SERPL: 2 % (ref 15–46)
IRON SERPL-MCNC: 10 UG/DL (ref 35–180)
LYMPHOCYTES # BLD AUTO: 1.4 10E9/L (ref 0.8–5.3)
LYMPHOCYTES NFR BLD AUTO: 19.9 %
MCH RBC QN AUTO: 19.4 PG (ref 26.5–33)
MCHC RBC AUTO-ENTMCNC: 29.1 G/DL (ref 31.5–36.5)
MCV RBC AUTO: 67 FL (ref 78–100)
MONOCYTES # BLD AUTO: 0.8 10E9/L (ref 0–1.3)
MONOCYTES NFR BLD AUTO: 11.8 %
NEUTROPHILS # BLD AUTO: 4.3 10E9/L (ref 1.6–8.3)
NEUTROPHILS NFR BLD AUTO: 63.8 %
PLATELET # BLD AUTO: 503 10E9/L (ref 150–450)
RBC # BLD AUTO: 3.82 10E12/L (ref 4.4–5.9)
TIBC SERPL-MCNC: 418 UG/DL (ref 240–430)
WBC # BLD AUTO: 6.8 10E9/L (ref 4–11)

## 2020-04-22 PROCEDURE — 85025 COMPLETE CBC W/AUTO DIFF WBC: CPT | Performed by: INTERNAL MEDICINE

## 2020-04-22 PROCEDURE — 83550 IRON BINDING TEST: CPT | Performed by: INTERNAL MEDICINE

## 2020-04-22 PROCEDURE — 82728 ASSAY OF FERRITIN: CPT | Performed by: INTERNAL MEDICINE

## 2020-04-22 PROCEDURE — 36415 COLL VENOUS BLD VENIPUNCTURE: CPT | Performed by: INTERNAL MEDICINE

## 2020-04-22 PROCEDURE — 83540 ASSAY OF IRON: CPT | Performed by: INTERNAL MEDICINE

## 2020-04-22 RX ORDER — FLUTICASONE PROPIONATE 50 MCG
SPRAY, SUSPENSION (ML) NASAL
Qty: 16 G | Refills: 3 | Status: SHIPPED | OUTPATIENT
Start: 2020-04-22 | End: 2020-08-17

## 2020-04-22 NOTE — TELEPHONE ENCOUNTER
Called patient. Notified him of provider's message. Asked if he has dizziness, chest pain, or SOB? He said, no he does not, then said that sometimes he gets a little dizzy and thinks that it is from his medication Protonix.   Asked pt if he has blood in stools? He states that the last time he had blood in stool was a couple of days ago, it was like a liquid. He has not had any blood in stool today.   Writer advised pt to go to the ER for the above symptoms, (blood in stool and SOB) per provider's recommendation. Pt then began to ask if the provider looked at his chart. States that he already described his symptoms and that Dr. Knutson sent him a message that the blood in stool likely had to do with his lower GI system or lower part in the colon area. Erosion taking place, nothing above.   Writer re-iterated provider's recommendation multiple times, and then pt asked if his lab result is dangerous. Notified him that his hgb is critically low. Pt then stated that he will see how it goes, and if he has symptoms he will go to the ER, but he doesn't want to go out. He plans to do telephone appt with provider tomorrow.

## 2020-04-22 NOTE — TELEPHONE ENCOUNTER
Please call patient-    I am covering for Dr. Knutson today and understand that he has a phone visit with him tomorrow.  His labs show that his hgb has dropped to 7.4 from his hospital stay when it was 8.7 at discharge.  Is he having any symptoms of dizziness, chest pain, shortness of breath?  Has he seen any more blood in his stools?  If he is having any significant rectal bleeding or any of the above symptoms, I would recommend ED treatment.  If he feels okay, I would recommend that he keep is phone appointment with Isaac Knutson MD tomorrow to determine next best course of treatment for patient.    Thanks,  Suyapa Hernandez, CNP

## 2020-04-23 ENCOUNTER — TELEPHONE (OUTPATIENT)
Dept: FAMILY MEDICINE | Facility: CLINIC | Age: 75
End: 2020-04-23

## 2020-04-23 NOTE — TELEPHONE ENCOUNTER
Reason for call:  Other   Patient called regarding (reason for call): ER     Additional comments: Patient is calling to state he is going to admit himself to the ER today due to feeling weaker & worse than yesterday. Please call to advise.     Phone number to reach patient:  Home number on file [phone] (home)    Best Time:  Any     Can we leave a detailed message on this number?  YES    Travel screening: Not Applicable

## 2020-04-23 NOTE — TELEPHONE ENCOUNTER
See phone encounter from yesterday 4/22/2020  Patient was advised to go to ED due to low hgb    Called patient back and he stated that he is currently at [facility] ED checking in  He did not need a return call    Jl Beckford RN

## 2020-04-24 ENCOUNTER — MYC MEDICAL ADVICE (OUTPATIENT)
Dept: INTERNAL MEDICINE | Facility: CLINIC | Age: 75
End: 2020-04-24

## 2020-04-24 ENCOUNTER — PATIENT OUTREACH (OUTPATIENT)
Dept: CARE COORDINATION | Facility: CLINIC | Age: 75
End: 2020-04-24

## 2020-04-24 DIAGNOSIS — D62 ANEMIA DUE TO BLOOD LOSS, ACUTE: Primary | ICD-10-CM

## 2020-04-24 DIAGNOSIS — K92.2 GASTROINTESTINAL HEMORRHAGE: ICD-10-CM

## 2020-04-24 DIAGNOSIS — D50.0 IRON DEFICIENCY ANEMIA DUE TO CHRONIC BLOOD LOSS: Primary | ICD-10-CM

## 2020-04-24 RX ORDER — HEPARIN SODIUM,PORCINE 10 UNIT/ML
5 VIAL (ML) INTRAVENOUS
Status: CANCELLED | OUTPATIENT
Start: 2020-04-24

## 2020-04-24 RX ORDER — HEPARIN SODIUM (PORCINE) LOCK FLUSH IV SOLN 100 UNIT/ML 100 UNIT/ML
5 SOLUTION INTRAVENOUS
Status: CANCELLED | OUTPATIENT
Start: 2020-04-24

## 2020-04-24 NOTE — TELEPHONE ENCOUNTER
I called patient . We reviewed his situation in significant detail  . He understands that oral iron supplements are going to be slow, difficulties with possible constipation side effects or GI upset and we can't predict rate of absorption. He agrees with the urgency of needing iron transfusions and so these are ordered     I am asking you to call patient back and just try to make sure this project remains on track. If necessary, next week when I am furloughed one of my partners will help with any unforseen issues     See orders / treatment plan     Isaac Knutson MD

## 2020-04-24 NOTE — TELEPHONE ENCOUNTER
Called patient and left detailed message to schedule iron infusions at [phone], infusion center. Nothing else needed.       Cassandra CASTILLO CMA ([facility])

## 2020-04-24 NOTE — PROGRESS NOTES
Clinic Care Coordination Contact  Community Health Worker Initial Outreach    Patient accepts CC: Patient would like to talk to CC RN about COVID questions, may or may not enroll after conversation.    CHW spoke with patient today. Patient said he was in ER because of anemic situation. CHW said she saw patient talked to doctor today and wanted to see if patient had other questions or concerns. Patient gave summary of when he was in the ER in March and how he was not keen on how the problem was taken care of, nothing was really done. Patient was back in ER going through the same situation and hemoglobin went down to serious levels. Patient said PCP is working to get patient on a program for something like iron  transfusions. This should start soon. CHW said she saw a note from the doctor about this and if patient does not hear anything about it by mid-week next week to call PCP and ask for one of the nurses.    Patient said that when PCP sends lab results, he would like for PCP to explain what the results say right away because it is all in medical jargon. Patient said PCP used to do this a few years ago. Patient said this would make it easier to understand and have clear communication. CC RN will discuss this with patient.    Patient reported that he has not felt well in over two months. Patient also said he was feeling poorly in 2019.    CHW asked if patient had any other questions or concerns. Patient said yes, had questions about COVID. Patient is concerned by the fact he has not been asked if he would like a test for COVID. CHW said she can set up a call with CC RN to help try to answer some of the patient's COVID questions, patient said that would be great. CHW scheduled a call with CC RN on 4/27 at 1:30 p.m.    Patient said he should be available at 1:30 on 4/27 but if he doesn't answer, to please leave a message.

## 2020-04-24 NOTE — TELEPHONE ENCOUNTER
Please make sure he is aware to schedule iron infusions and give him the phone number to infusion center    Jl Beckford RN

## 2020-04-24 NOTE — TELEPHONE ENCOUNTER
Reason for call:  Other   Patient called regarding (reason for call): prescription  Additional comments: Check [facility] message, requesting iron. Please call to advise.     Phone number to reach patient:  Home number on file [phone] (home)    Best Time:  Any     Can we leave a detailed message on this number?  YES    Travel screening: Not Applicable

## 2020-04-27 ENCOUNTER — PATIENT OUTREACH (OUTPATIENT)
Dept: CARE COORDINATION | Facility: CLINIC | Age: 75
End: 2020-04-27

## 2020-04-27 ENCOUNTER — APPOINTMENT (OUTPATIENT)
Dept: NURSING | Facility: CLINIC | Age: 75
End: 2020-04-27
Payer: COMMERCIAL

## 2020-04-27 DIAGNOSIS — D50.9 IRON DEFICIENCY ANEMIA, UNSPECIFIED: ICD-10-CM

## 2020-04-27 ASSESSMENT — ACTIVITIES OF DAILY LIVING (ADL): DEPENDENT_IADLS:: INDEPENDENT

## 2020-04-27 NOTE — LETTER
[place] CARE COORDINATION  [address]  [place] [address]    April 27, 2020    Chi Newman  [address]  [place] [address]      Dear Chi,    I am a clinic care coordinator who works with Isaac Knutson MD at [facility]. I wanted to thank you for spending the time to talk with me.  Below is a description of clinic care coordination and how I can further assist you.      The clinic care coordination team is made up of a registered nurse,  and community health worker who understand the health care system. The goal of clinic care coordination is to help you manage your health and improve access to the health care system in the most efficient manner. The team can assist you in meeting your health care goals by providing education, coordinating services, strengthening the communication among your providers and supporting you with any resource needs.    Please feel free to contact me at [phone] with any questions or concerns. We are focused on providing you with the highest-quality healthcare experience possible and that all starts with you.     Sincerely,     Martha Lang RN, [facility] - Primary Care Clinic RN Coordinator  [facility]     [phone]

## 2020-04-27 NOTE — LETTER
[facility]  Complex Care Plan  About Me:    Patient Name:  Chi Newman    YOB: 1945  Age:         75 year old   [place] MRN:    [id number] Telephone Information:  Home Phone [phone]   Mobile [phone]       Address:  [address] [place] [address] Email address:  [email or web address]      Emergency Contact(s)    Name Relationship Lgl Grd Work Phone Home Phone Mobile Phone   1. TIFFANIE VARMA Daughter No  [phone]   2. DECLINE,PER PT Declined               Primary language:  English     needed? No   [facility] [employer]:  [phone] op. 1  Other communication barriers: Glasses  Preferred Method of Communication:  Mail  Current living arrangement: I live alone, I live in a private home  Mobility Status/ Medical Equipment: Independent    Health Maintenance  Health Maintenance Reviewed:      My Access Plan  Medical Emergency 911   Primary Clinic Line [facility] - [phone]   24 Hour Appointment Line [phone] or  [phone]) (toll-free)   24 Hour Nurse Line [phone] (toll-free)   Preferred Urgent Care     Preferred Hospital [facility]  [phone]   Preferred Pharmacy [facility] [place] - [address] [facility]     Behavioral Health Crisis Line The National Suicide Prevention Lifeline at 1-769.495.4091 or 911             My Care Team Members  Patient Care Team       Relationship Specialty Notifications Start End    Isaac Knutson MD PCP - General Internal Medicine  7/2/13     Phone: [phone] Fax: [phone] [address] [address]    Isaac Knutson MD Assigned PCP   10/27/13     Phone: [phone] Fax: [phone] [address] [address]    Fawad Robles MD  Ophthalmology  1/5/16     Phone: [phone] Fax: [phone]         [address] [place] [address]     "Martha Lang, RN Lead Care Coordinator Primary Care - CC Admissions 4/27/20     Phone: [phone]                 My Care Plans  Self Management and Treatment Plan  Goals and (Comments)  Goals        General    Medical (pt-stated)     Notes - Note created  4/27/2020  1:59 PM by Martha Lang, RN    Goal Statement: I want to feel \"like my old self again\"     Date Goal set: 4/27/2020    Barriers: Iron deficiency    Strengths: Motivated    Date to Achieve By: July 2020    Patient expressed understanding of goal: Yes    Action steps to achieve this goal:  1. I will have iron infusions as scheduled  2. I will eat a healthy diet  3. I will get adequate sleep  4. I will stay home, social distance, wear a mask and perform good handwashing to decrease exposure to covid-19 pandemic              Action Plans on File:                       Advance Care Plans/Directives Type:   Type Advanced Care Plans/Directives: Advanced Directive - On File    My Medical and Care Information  Problem List   Patient Active Problem List   Diagnosis     Health Care Home     Hyperlipidemia with target LDL less than 130     Diverticulosis of colon     Elevated liver enzymes     Hypovitaminosis D     Environmental allergies     Seasonal allergic rhinitis     Pneumonia     BPH (benign prostatic hyperplasia)     Diverticulitis of colon     Chronic nonalcoholic liver disease     Microscopic hematuria     Umbilical hernia     Elevated glucose     Elevated prostate specific antigen (PSA)     Drusen of macula of both eyes     Posterior vitreous detachment of right eye     Nonexudative senile macular degeneration of retina     Senile nuclear sclerosis, bilateral     Slow transit constipation     Vitreous syneresis of left eye     PVD (posterior vitreous detachment), both eyes     History of diverticulitis of colon     History of partial colectomy     Meibomian gland dysfunction (MGD) of both eyes     Dry eye of right side     Gastric erosion, unspecified " chronicity     Iron deficiency anemia due to chronic blood loss     Iron deficiency anemia, unspecified      Current Medications and Allergies:  See printed Medication Report.    Care Coordination Start Date: No linked episodes   Frequency of Care Coordination: monthly   Form Last Updated: 04/27/2020

## 2020-04-27 NOTE — PROGRESS NOTES
"Clinic Care Coordination Contact    Clinic Care Coordination Contact  OUTREACH    Referral Information:  Referral Source: [facility]    Primary Diagnosis: GI Disorders    Chief Complaint   Patient presents with     Clinic Care Coordination - Post Hospital     RN   Universal Utilization: [facility]ses [facility]  Clinic Utilization  Difficulty keeping appointments:: No  Compliance Concerns: No  No-Show Concerns: No  No PCP office visit in Past Year: No  Utilization    Last refreshed: 4/27/2020  1:36 PM:  Hospital Admissions 0           Last refreshed: 4/27/2020  1:36 PM:  ED Visits 0           Last refreshed: 4/27/2020  1:36 PM:  No Show Count (past year) 0              Current as of: 4/27/2020  1:36 PM          Clinical Concerns:    Current Medical Concerns:  Outreach to patient.    Patient states he \"was under the weather when he woke this morning\"  He has nothing specific of what this means but states once he was up and moving he is feeling better now.     Patient states he does not know if his fatigue is from covid-19, iron deficiency or seasonal allergies.     We reviewed the symptoms of Specific symptoms of covid-19. Fever, persistent cough, shortness of breath, muscles aches consistently, loss of taste and smell.  Patient has none of these symptoms. he does have muscle aching off and on.    Patient states he has allergies and takes allegra and flonase with relief.    Patient denies any symptoms of bleeding, no acute blood, no black stools, no stomach upset.   Patient states he is scheduled for his first iron infusion tomorrow (4/28/20) he states he [facility] this past weekend so he knew where he was going. He states he used to spend time in the area and he could not believe how developed it has become.     We talked about what he could expect when he went for his infusion. He will be screened for covid-19 symptoms before entering, he will wear a mask and practice social " distancing.  If her were to have any symptoms he would be tested.    Patient states he is very conscious about staying home, wearing a mask, washing everything down, carries hand  with him at all times. States he is washing his clothes more often as well. He is being very cautious.          Current Behavioral Concerns: Anxiety over covid-19 pandemic      Education Provided to patient: Role of care coordination. Education as above.      Pain  Pain (GOAL):: No    Health Maintenance Reviewed:   Health Maintenance Due   Topic Date Due     AORTIC ANEURYSM SCREENING (SYSTEM ASSIGNED)  01/06/2012     Clinical Pathway: None    Medication Management:  Independent in administration.  Knowledgeable about actions    Functional Status:  Dependent ADLs:: Independent  Dependent IADLs:: Independent  Bed or wheelchair confined:: No  Mobility Status: Independent    Living Situation:  Current living arrangement:: I live alone, I live in a private home  Type of residence:: Private home - stairs    Lifestyle & Psychosocial Needs:  Lifestyle     Physical activity     Days per week: 3 days     Minutes per session: 40 min     Stress: Not on file     Social Needs     Financial resource strain: Not hard at all     Food insecurity     Worry: Never true     Inability: Never true     Transportation needs     Medical: No     Non-medical: No     Diet:: Other(High fiber/low acid)  Inadequate nutrition (GOAL):: No  Tube Feeding: No  Inadequate activity/exercise (GOAL):: No  Significant changes in sleep pattern (GOAL): No  Transportation means:: Accessible car, Regular car     Adventism or spiritual beliefs that impact treatment:: No  Mental health DX:: No  Mental health management concern (GOAL):: No  Informal Support system:: Children   Socioeconomic History     Marital status: Single     Spouse name: Not on file     Number of children: 1     Years of education: Not on file     Highest education level: Not on file     Tobacco Use      "Smoking status: Never Smoker     Smokeless tobacco: Never Used   Substance and Sexual Activity     Alcohol use: Yes     Alcohol/week: 0.0 standard drinks     Comment: Occasional glass of wine and beer.     Drug use: No     Sexual activity: Not Currently     Partners: Female     Birth control/protection: Condom   Resources and Interventions:  Current Resources:      Community Resources: None     Equipment Currently Used at Home: none    Advance Care Plan/Directive  Advanced Care Plans/Directives on file:: Yes  Type Advanced Care Plans/Directives: Advanced Directive - On File    Referrals Placed: None     Goals:   Goals        General    Medical (pt-stated)     Notes - Note created  4/27/2020  1:59 PM by Martha Lang RN    Goal Statement: I want to feel \"like my old self again\"     Date Goal set: 4/27/2020    Barriers: Iron deficiency    Strengths: Motivated    Date to Achieve By: July 2020    Patient expressed understanding of goal: Yes    Action steps to achieve this goal:  1. I will have iron infusions as scheduled  2. I will eat a healthy diet  3. I will get adequate sleep  4. I will stay home, social distance, wear a mask and perform good handwashing to decrease exposure to covid-19 pandemic             Patient/Caregiver understanding: Expresses understanding    Outreach Frequency: monthly  Future Appointments              Tomorrow [place] [facility]    In 1 week [place]    In 7 months Connie Martinez MD [facility] [place] [facility]          Plan: Patient will complete iron infusions.      Clinic care coordinator will follow up in one month.    Martha Lang RN, [facility] - Primary Care Clinic RN Coordinator  [facility]   4/27/2020    2:17 PM  [phone]    "

## 2020-04-28 ENCOUNTER — INFUSION THERAPY VISIT (OUTPATIENT)
Dept: INFUSION THERAPY | Facility: CLINIC | Age: 75
End: 2020-04-28
Payer: COMMERCIAL

## 2020-04-28 VITALS
HEART RATE: 62 BPM | SYSTOLIC BLOOD PRESSURE: 145 MMHG | OXYGEN SATURATION: 100 % | RESPIRATION RATE: 18 BRPM | DIASTOLIC BLOOD PRESSURE: 70 MMHG | WEIGHT: 174 LBS | BODY MASS INDEX: 29.34 KG/M2

## 2020-04-28 DIAGNOSIS — K25.9 GASTRIC EROSION, UNSPECIFIED CHRONICITY: Primary | ICD-10-CM

## 2020-04-28 DIAGNOSIS — D50.0 IRON DEFICIENCY ANEMIA DUE TO CHRONIC BLOOD LOSS: ICD-10-CM

## 2020-04-28 PROCEDURE — 99207 ZZC NO CHARGE LOS: CPT

## 2020-04-28 PROCEDURE — 96365 THER/PROPH/DIAG IV INF INIT: CPT | Performed by: NURSE PRACTITIONER

## 2020-04-28 RX ORDER — HEPARIN SODIUM,PORCINE 10 UNIT/ML
5 VIAL (ML) INTRAVENOUS
Status: CANCELLED | OUTPATIENT
Start: 2020-05-05

## 2020-04-28 RX ORDER — HEPARIN SODIUM (PORCINE) LOCK FLUSH IV SOLN 100 UNIT/ML 100 UNIT/ML
5 SOLUTION INTRAVENOUS
Status: CANCELLED | OUTPATIENT
Start: 2020-05-05

## 2020-04-28 RX ADMIN — Medication 250 ML: at 14:51

## 2020-04-28 NOTE — PROGRESS NOTES
Infusion Nursing Note:  Chi Newman presents today for Injectafer.    Patient seen by provider today: No   present during visit today: Not Applicable.    Note: N/A.    Intravenous Access:  Peripheral IV placed.    Treatment Conditions:  Not Applicable.      Post Infusion Assessment:  Patient tolerated infusion without incident.  Patient observed for 30 minutes post infusion per protocol.       Discharge Plan:    Patient will return 5/5/2020 for next appointment.   Patient discharged in stable condition accompanied by: self.  Departure Mode: Ambulatory.    Charo Larkin RN

## 2020-05-05 ENCOUNTER — INFUSION THERAPY VISIT (OUTPATIENT)
Dept: INFUSION THERAPY | Facility: CLINIC | Age: 75
End: 2020-05-05
Payer: COMMERCIAL

## 2020-05-05 VITALS
BODY MASS INDEX: 27.9 KG/M2 | DIASTOLIC BLOOD PRESSURE: 64 MMHG | OXYGEN SATURATION: 96 % | WEIGHT: 173.6 LBS | HEIGHT: 66 IN | SYSTOLIC BLOOD PRESSURE: 146 MMHG | HEART RATE: 54 BPM | RESPIRATION RATE: 16 BRPM | TEMPERATURE: 98.4 F

## 2020-05-05 DIAGNOSIS — D50.0 IRON DEFICIENCY ANEMIA DUE TO CHRONIC BLOOD LOSS: ICD-10-CM

## 2020-05-05 DIAGNOSIS — K25.9 GASTRIC EROSION, UNSPECIFIED CHRONICITY: Primary | ICD-10-CM

## 2020-05-05 PROCEDURE — 99207 ZZC NO CHARGE LOS: CPT | Performed by: NURSE PRACTITIONER

## 2020-05-05 PROCEDURE — 96365 THER/PROPH/DIAG IV INF INIT: CPT | Performed by: NURSE PRACTITIONER

## 2020-05-05 RX ORDER — HEPARIN SODIUM (PORCINE) LOCK FLUSH IV SOLN 100 UNIT/ML 100 UNIT/ML
5 SOLUTION INTRAVENOUS
Status: CANCELLED | OUTPATIENT
Start: 2020-05-05

## 2020-05-05 RX ORDER — HEPARIN SODIUM,PORCINE 10 UNIT/ML
5 VIAL (ML) INTRAVENOUS
Status: CANCELLED | OUTPATIENT
Start: 2020-05-05

## 2020-05-05 RX ADMIN — Medication 250 ML: at 14:47

## 2020-05-05 ASSESSMENT — PAIN SCALES - GENERAL: PAINLEVEL: NO PAIN (0)

## 2020-05-05 ASSESSMENT — MIFFLIN-ST. JEOR: SCORE: 1475.19

## 2020-05-05 NOTE — PROGRESS NOTES
Infusion Nursing Note:  Chi Newman presents today for Injectafer 2 of 2.    Patient seen by provider today: No   present during visit today: Not Applicable.    Note: Patient complains of vertigo on and off and feeling wiped out from first infusion. Suggested tylenol as needed and to contact MD regarding vertigo/dizziness if persists.    Intravenous Access:  Peripheral IV placed.    Treatment Conditions:  Not Applicable.      Post Infusion Assessment:  Patient tolerated infusion without incident.  Patient observed for 30 minutes post infusion per protocol.  Blood return noted pre and post infusion.  Site patent and intact, free from redness, edema or discomfort.  No evidence of extravasations.  Access discontinued per protocol.       Discharge Plan:   Discharge instructions reviewed with: Patient.  Patient and/or family verbalized understanding of discharge instructions and all questions answered.  Patient discharged in stable condition accompanied by: self.  Departure Mode: Ambulatory.    Eleonora Ling RN

## 2020-05-05 NOTE — PATIENT INSTRUCTIONS
Pt to return as needed for infusion therapy needs. Copies of medication list and upcoming appointments given prior to discharge.

## 2020-05-08 ENCOUNTER — PATIENT OUTREACH (OUTPATIENT)
Dept: CARE COORDINATION | Facility: CLINIC | Age: 75
End: 2020-05-08

## 2020-05-08 NOTE — PROGRESS NOTES
"Clinic Care Coordination Contact    Follow Up Progress Note      Assessment: Patient calling clinic care coordinator.    Chi states that he has finished his second iron infusion. He states he is feeling some better. He has \"good days and then a bad day\"  He states he does feel more alert and mentally sharp.    His stomach is feeling better, denies pain. He does verify that he is taking protonix. Encouraged him to continue this.  He states the diarrhea has stopped. He is having soft stools daily. He states he stays away from dairy as that does not agree with him. We discussed that he can try yogurt in moderation. His appetite is good and he feels he is eating a healthy diet.    He states he has an occasional headache, which may be related to his allergies, and tylenol takes care of this.     He states he is having one or tow nights a week where he can't get to sleep. He knows some of this is his anxiety over the current world situation.     He is walking again and trying to develop an exercise routine.     He suffers from allergies and states right now is bad with everythnig blooming. He states he has not opened his windows and he has a heppa filter air purifier which makes a huge difference for him.     Goals addressed this encounter: making progress  Goals Addressed                 This Visit's Progress      Medical (pt-stated)   On track     Goal Statement: I want to feel \"like my old self again\"     Date Goal set: 4/27/2020    Barriers: Iron deficiency    Strengths: Motivated    Date to Achieve By: July 2020    Patient expressed understanding of goal: Yes    Action steps to achieve this goal:  1. I will have iron infusions as scheduled  2. I will eat a healthy diet  3. I will get adequate sleep  4. I will stay home, social distance, wear a mask and perform good handwashing to decrease exposure to covid-19 pandemic         Intervention/Education provided during outreach: Discussed diet, protinix use, exercise " and patience for his recovery.    We had a lengthy discussion regarding covid-19 and his concerns, knowledge and feelings.      Plan:   Patient will stay home, practice social distancing, good hand washing, wear a mask.    Care Coordinator will follow up in one month.    Martha Lang RN, [facility] RN Coordinator  [facility]   5/8/2020    3:39 PM  [phone]

## 2020-05-20 ENCOUNTER — MYC MEDICAL ADVICE (OUTPATIENT)
Dept: INTERNAL MEDICINE | Facility: CLINIC | Age: 75
End: 2020-05-20

## 2020-05-20 DIAGNOSIS — D50.0 IRON DEFICIENCY ANEMIA DUE TO CHRONIC BLOOD LOSS: Primary | ICD-10-CM

## 2020-06-02 ENCOUNTER — MYC MEDICAL ADVICE (OUTPATIENT)
Dept: INTERNAL MEDICINE | Facility: CLINIC | Age: 75
End: 2020-06-02

## 2020-06-03 NOTE — TELEPHONE ENCOUNTER
Dr. Knutson,  Please see LIA message below. Thanks.    Rachel Ledesma RN  [facility]

## 2020-06-04 ENCOUNTER — MYC MEDICAL ADVICE (OUTPATIENT)
Dept: INTERNAL MEDICINE | Facility: CLINIC | Age: 75
End: 2020-06-04

## 2020-06-09 ENCOUNTER — PATIENT OUTREACH (OUTPATIENT)
Dept: CARE COORDINATION | Facility: CLINIC | Age: 75
End: 2020-06-09

## 2020-06-09 NOTE — PROGRESS NOTES
"Clinic Care Coordination Contact    Follow Up Progress Note      Assessment: Outreach to patient. Left message on his voicemail and requested return call.     Patient has had several MyChart communications with PCP regarding covid-19 testing.  He felt he had the virus, had a negative test, but is not confident of its accuracy. PCP recommended he can have serology testing in August to determine if he has antibodies.     Patient has scheduled routine follow up for hemoglobin and iron labs.        Goals addressed this encounter:   Goals Addressed                 This Visit's Progress      Medical (pt-stated)   60%     Goal Statement: I want to feel \"like my old self again\"     Date Goal set: 4/27/2020    Barriers: Iron deficiency    Strengths: Motivated    Date to Achieve By: July 2020    Patient expressed understanding of goal: Yes    Action steps to achieve this goal:  1. I will have iron infusions as scheduled  2. I will eat a healthy diet  3. I will get adequate sleep  4. I will stay home, social distance, wear a mask and perform good handwashing to decrease exposure to covid-19 pandemic         Intervention/Education provided during outreach: Requested return call     Outreach Frequency: monthly    Plan:   Patient will have routine labs done.    Care Coordinator will follow up in one month.     Martha Lang RN, [facility] - [facility] Clinic RN Coordinator  [facility]   6/9/2020    3:27 PM  [phone]  "

## 2020-06-16 ENCOUNTER — PATIENT OUTREACH (OUTPATIENT)
Dept: CARE COORDINATION | Facility: CLINIC | Age: 75
End: 2020-06-16

## 2020-07-02 DIAGNOSIS — D50.0 IRON DEFICIENCY ANEMIA DUE TO CHRONIC BLOOD LOSS: ICD-10-CM

## 2020-07-02 LAB
BASOPHILS # BLD AUTO: 0.1 10E9/L (ref 0–0.2)
BASOPHILS NFR BLD AUTO: 0.8 %
DIFFERENTIAL METHOD BLD: ABNORMAL
EOSINOPHIL # BLD AUTO: 0.2 10E9/L (ref 0–0.7)
EOSINOPHIL NFR BLD AUTO: 3.3 %
ERYTHROCYTE [DISTWIDTH] IN BLOOD BY AUTOMATED COUNT: 20.2 % (ref 10–15)
FERRITIN SERPL-MCNC: 15 NG/ML (ref 26–388)
HCT VFR BLD AUTO: 36.7 % (ref 40–53)
HGB BLD-MCNC: 11.9 G/DL (ref 13.3–17.7)
IRON SATN MFR SERPL: 9 % (ref 15–46)
IRON SERPL-MCNC: 31 UG/DL (ref 35–180)
LYMPHOCYTES # BLD AUTO: 1.4 10E9/L (ref 0.8–5.3)
LYMPHOCYTES NFR BLD AUTO: 21.9 %
MCH RBC QN AUTO: 26.2 PG (ref 26.5–33)
MCHC RBC AUTO-ENTMCNC: 32.4 G/DL (ref 31.5–36.5)
MCV RBC AUTO: 81 FL (ref 78–100)
MONOCYTES # BLD AUTO: 0.5 10E9/L (ref 0–1.3)
MONOCYTES NFR BLD AUTO: 8.3 %
NEUTROPHILS # BLD AUTO: 4.1 10E9/L (ref 1.6–8.3)
NEUTROPHILS NFR BLD AUTO: 65.7 %
PLATELET # BLD AUTO: 314 10E9/L (ref 150–450)
RBC # BLD AUTO: 4.54 10E12/L (ref 4.4–5.9)
TIBC SERPL-MCNC: 341 UG/DL (ref 240–430)
WBC # BLD AUTO: 6.3 10E9/L (ref 4–11)

## 2020-07-02 PROCEDURE — 83540 ASSAY OF IRON: CPT | Performed by: INTERNAL MEDICINE

## 2020-07-02 PROCEDURE — 85025 COMPLETE CBC W/AUTO DIFF WBC: CPT | Performed by: INTERNAL MEDICINE

## 2020-07-02 PROCEDURE — 82728 ASSAY OF FERRITIN: CPT | Performed by: INTERNAL MEDICINE

## 2020-07-02 PROCEDURE — 83550 IRON BINDING TEST: CPT | Performed by: INTERNAL MEDICINE

## 2020-07-02 PROCEDURE — 36415 COLL VENOUS BLD VENIPUNCTURE: CPT | Performed by: INTERNAL MEDICINE

## 2020-07-06 ENCOUNTER — MYC MEDICAL ADVICE (OUTPATIENT)
Dept: INTERNAL MEDICINE | Facility: CLINIC | Age: 75
End: 2020-07-06

## 2020-07-16 ENCOUNTER — PATIENT OUTREACH (OUTPATIENT)
Dept: NURSING | Facility: CLINIC | Age: 75
End: 2020-07-16
Payer: COMMERCIAL

## 2020-07-16 NOTE — PROGRESS NOTES
"Clinic Care Coordination Contact    Follow Up Progress Note      Assessment: Outreach to patient.     [place]  He states he is feeling better. He thinks his stamina is better and he no longer feels weak. He states he has not started the over the counter iron as he discussed with PCP. He says he is waiting to the end of the month to see if he really needs it. He was very happy with his lab results.    Patient states he is eating well. Trying to eat vegetables, fiber and grains that are higher in oron.      Patient states he is walking. He finds he \"gets sore muscles\" after walking. He states he needs to slow down a bit. He talks about running marathons when he was younger.      Patient states he went to [employer], where he used to work out. He states he found they were not complying with any of rules regarding covid-19, no social distancing, no masks and no cleaning. He states he will not be going back.       Patient talks at length about covid-19. He follows the news and medical experts opinions closely.  He states he has not seen his daughter and grandchildren for four months. He stalks about being able to zoom. We discussed some ways to see them by being outside, social distancing, wearing masks or even drive by encounters.      Goals addressed this encounter:   Goals Addressed                 This Visit's Progress      Medical (pt-stated)   60%     Goal Statement: I want to feel \"like my old self again\"     Date Goal set: 4/27/2020    Barriers: Iron deficiency    Strengths: Motivated    Date to Achieve By: October 2020    Patient expressed understanding of goal: Yes    Action steps to achieve this goal:  1. I will have iron infusions as scheduled  2. I will eat a healthy diet  3. I will get adequate sleep  4. I will stay home, social distance, wear a mask and perform good handwashing to decrease exposure to covid-19 pandemic         Intervention/Education provided during outreach: As " above     Outreach Frequency: monthly    Plan:   patient will continue to have labs checked monthly.  Patient is planning to schedule audiology check, dental appointment and eye appointment before the end of the year.     Care Coordinator will follow up in one month    Martha Lang RN, [facility] - [facility] Clinic RN Coordinator  [facility]   7/16/2020    3:11 PM  [phone]

## 2020-07-28 ENCOUNTER — MYC MEDICAL ADVICE (OUTPATIENT)
Dept: INTERNAL MEDICINE | Facility: CLINIC | Age: 75
End: 2020-07-28

## 2020-07-28 DIAGNOSIS — H61.23 BILATERAL IMPACTED CERUMEN: Primary | ICD-10-CM

## 2020-07-28 NOTE — TELEPHONE ENCOUNTER
"Routing to provider to document \"okay\" for ear wash to remove impacted cerumen or can sign new order (cued). Last one was done 04/12/2019 and clinic needs to have annual provider approval for pt to schedule with ancillary MA for ear wash.  "

## 2020-08-03 ENCOUNTER — ALLIED HEALTH/NURSE VISIT (OUTPATIENT)
Dept: NURSING | Facility: CLINIC | Age: 75
End: 2020-08-03
Payer: COMMERCIAL

## 2020-08-03 DIAGNOSIS — H61.23 BILATERAL IMPACTED CERUMEN: Primary | ICD-10-CM

## 2020-08-03 DIAGNOSIS — D50.0 IRON DEFICIENCY ANEMIA DUE TO CHRONIC BLOOD LOSS: ICD-10-CM

## 2020-08-03 LAB
BASOPHILS # BLD AUTO: 0 10E9/L (ref 0–0.2)
BASOPHILS NFR BLD AUTO: 0.6 %
DIFFERENTIAL METHOD BLD: ABNORMAL
EOSINOPHIL # BLD AUTO: 0.2 10E9/L (ref 0–0.7)
EOSINOPHIL NFR BLD AUTO: 2.9 %
ERYTHROCYTE [DISTWIDTH] IN BLOOD BY AUTOMATED COUNT: 17.2 % (ref 10–15)
FERRITIN SERPL-MCNC: 7 NG/ML (ref 26–388)
HCT VFR BLD AUTO: 35.8 % (ref 40–53)
HGB BLD-MCNC: 11.2 G/DL (ref 13.3–17.7)
IRON SATN MFR SERPL: 6 % (ref 15–46)
IRON SERPL-MCNC: 23 UG/DL (ref 35–180)
LYMPHOCYTES # BLD AUTO: 1.4 10E9/L (ref 0.8–5.3)
LYMPHOCYTES NFR BLD AUTO: 22.1 %
MCH RBC QN AUTO: 25.3 PG (ref 26.5–33)
MCHC RBC AUTO-ENTMCNC: 31.3 G/DL (ref 31.5–36.5)
MCV RBC AUTO: 81 FL (ref 78–100)
MONOCYTES # BLD AUTO: 0.6 10E9/L (ref 0–1.3)
MONOCYTES NFR BLD AUTO: 9.9 %
NEUTROPHILS # BLD AUTO: 4 10E9/L (ref 1.6–8.3)
NEUTROPHILS NFR BLD AUTO: 64.5 %
PLATELET # BLD AUTO: 343 10E9/L (ref 150–450)
RBC # BLD AUTO: 4.42 10E12/L (ref 4.4–5.9)
TIBC SERPL-MCNC: 368 UG/DL (ref 240–430)
WBC # BLD AUTO: 6.2 10E9/L (ref 4–11)

## 2020-08-03 PROCEDURE — 82728 ASSAY OF FERRITIN: CPT | Performed by: INTERNAL MEDICINE

## 2020-08-03 PROCEDURE — 83540 ASSAY OF IRON: CPT | Performed by: INTERNAL MEDICINE

## 2020-08-03 PROCEDURE — 36415 COLL VENOUS BLD VENIPUNCTURE: CPT | Performed by: INTERNAL MEDICINE

## 2020-08-03 PROCEDURE — 83550 IRON BINDING TEST: CPT | Performed by: INTERNAL MEDICINE

## 2020-08-03 PROCEDURE — 99207 ZZC NON-BILLABLE SERV PER CHARTING: CPT

## 2020-08-03 PROCEDURE — 85025 COMPLETE CBC W/AUTO DIFF WBC: CPT | Performed by: INTERNAL MEDICINE

## 2020-08-03 NOTE — PROGRESS NOTES
Patient identified using two patient identifiers.  Ear exam showing wax occlusion completed by RN.  Solution: warm water was placed in the bilateral ear(s) via irrigation tool: elephant ear. Chaya Barba MA

## 2020-08-10 ENCOUNTER — PATIENT OUTREACH (OUTPATIENT)
Dept: NURSING | Facility: CLINIC | Age: 75
End: 2020-08-10
Payer: COMMERCIAL

## 2020-08-10 NOTE — PROGRESS NOTES
"Clinic Care Coordination Contact    Follow Up Progress Note      Assessment: Patient calling clinic care coordinator.     Patient states he has received his lab results and his hemoglobin is lower and his iron is lower. This is concerning to him.     He states he is feeling \"sluggish, weak and had been getting headaches\"  He states he has soreness in his neck, shoulders and arms.  He feels he is eating well. He states \"I force myself to eat because I don't have much of an appetite.\" He feels he is eating a diet high in iron.     He states his thinking is clear, he does not have \"the foggy feeling\" he had previously.  He states his eyes get blurry when he is tired. He is scheduled for a routine eye exam in December. He has not had his glasses changed in 2 years.     Patient states he has some ferrous sulfate that he picked up. He states PCP told him he could take these no more than every other day. He took one today, about two hours ago, and he feels like he now has more energy.   He denies constipation or diarrhea. States his bowel regime is good. No black stools, no signs of bleeding. He verifies he is continuing to take protonix.     Patient states he naps daily, but in the last couple of weeks he feels tired when he gets up from the nap.     Patient states his allergies also bother him currently. He states he does not leave the house without knowing what the pollen counts are.        Patient states he is very hopeful that there will soon be a covid-19 vaccine. He states he will feel much safer when that is available.     Goals addressed this encounter:   Goals Addressed                 This Visit's Progress      Medical (pt-stated)   30%     Goal Statement: I want to feel \"like my old self again\"     Date Goal set: 4/27/2020    Barriers: Iron deficiency    Strengths: Motivated    Date to Achieve By: October 2020    Patient expressed understanding of goal: Yes    Action steps to achieve this goal:  1. I will have " iron infusions as scheduled  2. I will eat a healthy diet  3. I will get adequate sleep  4. I will stay home, social distance, wear a mask and perform good handwashing to decrease exposure to covid-19 pandemic         Intervention/Education provided during outreach: Discussed diet, drinking fluids, signs and symptoms of GI bleed.      Outreach Frequency: monthly    Plan:   Patient will discuss his concerns with PCP on 8/13/2020 as scheduled.      Care Coordinator will follow up in one month.    Martha Lang RN, [facility] - [facility] Care Clinic RN Coordinator  [facility]   8/10/2020    3:23 PM  [phone]

## 2020-08-13 ENCOUNTER — VIRTUAL VISIT (OUTPATIENT)
Dept: INTERNAL MEDICINE | Facility: CLINIC | Age: 75
End: 2020-08-13
Payer: COMMERCIAL

## 2020-08-13 DIAGNOSIS — K25.9 GASTRIC EROSION, UNSPECIFIED CHRONICITY: ICD-10-CM

## 2020-08-13 DIAGNOSIS — D50.0 IRON DEFICIENCY ANEMIA DUE TO CHRONIC BLOOD LOSS: Primary | ICD-10-CM

## 2020-08-13 DIAGNOSIS — K57.32 DIVERTICULITIS OF COLON: ICD-10-CM

## 2020-08-13 PROCEDURE — 99213 OFFICE O/P EST LOW 20 MIN: CPT | Mod: 95 | Performed by: INTERNAL MEDICINE

## 2020-08-13 NOTE — PROGRESS NOTES
"Chi Newman is a 75 year old male who is being evaluated via a billable telephone visit.      The patient has been notified of following:     \"This telephone visit will be conducted via a call between you and your physician/provider. We have found that certain health care needs can be provided without the need for a physical exam.  This service lets us provide the care you need with a short phone conversation.  If a prescription is necessary we can send it directly to your pharmacy.  If lab work is needed we can place an order for that and you can then stop by our lab to have the test done at a later time.    Telephone visits are billed at different rates depending on your insurance coverage. During this emergency period, for some insurers they may be billed the same as an in-person visit.  Please reach out to your insurance provider with any questions.    If during the course of the call the physician/provider feels a telephone visit is not appropriate, you will not be charged for this service.\"    Patient has given verbal consent for Telephone visit?  Yes    What phone number would you like to be contacted at? [phone]    How would you like to obtain your AVS? [place]     Chi Newman is a 75 year old male who presents via phone visit today for the following health issues:    HPI  Chief Complaint   Patient presents with     Results     go over most recent lab work         Iron deficiency anemia due to chronic blood loss  Diverticulitis of colon  Gastric erosion, unspecified chronicity     Patient feels like 6 months is such a long time, since last appointment with me. Patient generally doing well. He had pre-visit laboratory studies to be reviewed today. From 8/3/2020. Drop with iron studies reviewed today and we are now really concerned with his persistent gastroenterological bleed , see assessment and plan section   .    Also hemoglobin has dropped some.     Patient takes daily " pantoprazole (Protonix).     Already had an upper and lower endoscopy January 2018 with the redo in march 2020 with no clear cut findings .       The patient had an exploratory laparotomy, small bowel resection for lysis of adhesions.  This was secondary to diverticulitis roughly [id number]. See care everywhere     He's enrolled with [facility] and was to have an upcoming video capsule swallow study to evaluate cryptogenic GI bleeding. This was supposed to be May but has been postponed secondary to Coronavirus (COVID-19).    He had a hemoglobin of 7 and was hospitalized and had several transfusions in March 2020.    Patient is not taking iron regularly due to worries about chronic constipation and side effects yet takes it when he develops fatigue and symptoms of generalized weakness and dizziness symptoms.    As of right now he doesn't have a scheduled [facility] follow up appointment.    6 months of pantoprazole (Protonix) has not solved this problem !    Patient Active Problem List   Diagnosis     Health Care Home     Hyperlipidemia with target LDL less than 130     Diverticulosis of colon     Elevated liver enzymes     Hypovitaminosis D     Environmental allergies     Seasonal allergic rhinitis     Pneumonia     BPH (benign prostatic hyperplasia)     Diverticulitis of colon     Chronic nonalcoholic liver disease     Microscopic hematuria     Umbilical hernia     Elevated glucose     Elevated prostate specific antigen (PSA)     Drusen of macula of both eyes     Posterior vitreous detachment of right eye     Nonexudative senile macular degeneration of retina     Senile nuclear sclerosis, bilateral     Slow transit constipation     Vitreous syneresis of left eye     PVD (posterior vitreous detachment), both eyes     History of diverticulitis of colon     History of partial colectomy     Meibomian gland dysfunction (MGD) of both eyes     Dry eye of right side     Gastric  erosion, unspecified chronicity     Iron deficiency anemia due to chronic blood loss     Iron deficiency anemia, unspecified     Past Surgical History:   Procedure Laterality Date     ABDOMEN SURGERY      Diverticulitus     ARTHROSCOPY KNEE  89    right knee, LMT     ARTHROSCOPY KNEE  1988    left knee, MMT     BIOPSY      Colonoscopy / Endoscopy     COLONOSCOPY  10/7/2002    diverticulosis, due again in      COLONOSCOPY  2013     SMALL BOWEL RESECTION  3/19/2014    small bowel resection, lysis of adhesions and pelvic drain placement     TONSILLECTOMY & ADENOIDECTOMY  age of 15       Social History     Tobacco Use     Smoking status: Never Smoker     Smokeless tobacco: Never Used   Substance Use Topics     Alcohol use: Yes     Alcohol/week: 0.0 standard drinks     Comment: Occasional glass of wine and beer.     Family History   Problem Relation Age of Onset     Cancer Mother         Colon Cancer     Colon Cancer Mother          of Colon/Intestinal cancer     Diabetes Brother         My brother  from complications of diabetes     Glaucoma No family hx of      Macular Degeneration No family hx of          Current Outpatient Medications   Medication Sig Dispense Refill     acetaminophen (TYLENOL) 500 MG tablet Take 500-1,000 mg by mouth every 6 hours as needed       cholecalciferol 25 MCG (1000 UT) TABS Take 3,000 Units by mouth daily 300 tablet 2     fexofenadine (ALLEGRA) 180 MG tablet Take 1 tablet (180 mg) by mouth daily Take 180 mg by mouth once daily. Indications: SEASONAL ALLERGIC RHINITIS 90 tablet 3     fluticasone (FLONASE) 50 MCG/ACT nasal spray USE 1 OR 2 SPRAYS IN EACH NOSTRIL DAILY 16 g 3     hypromellose (GENTEAL) ophthalmic gel 0.3% Place 1 drop into both eyes every hour as needed for dry eyes       ibuprofen (ADVIL,MOTRIN) 200 MG tablet Take 200 mg by mouth every 4 hours as needed       pantoprazole (PROTONIX) 40 MG EC tablet Take 1 tablet (40 mg) by mouth daily 90 tablet 1      Probiotic Product (PROBIOTIC ADVANCED PO)        Propylene Glycol (SYSTANE BALANCE OP) Apply 1 drop to eye 4 times daily       triamcinolone (KENALOG) 0.1 % external cream Apply topically 2 times daily 453.6 g 0     STATIN NOT PRESCRIBED (INTENTIONAL) Please choose reason not prescribed, below (Patient not taking: Reported on 5/5/2020)       tobramycin-dexamethasone (TOBRADEX) 0.3-0.1 % ophthalmic suspension Place 1 drop into the right eye 3 times daily (Patient not taking: Reported on 5/5/2020) 1 Bottle 1     Allergies   Allergen Reactions     Atorvastatin      Vytorin      Vancomycin Rash     Rash/hives     Recent Labs   Lab Test 01/13/20  1418 11/29/18  1501 10/31/17  1221 10/18/16  1357  10/15/15  1412  11/05/13  1453   A1C 5.4 5.7* 5.4 5.5  --  5.6   < >  --    LDL 82 Cannot estimate LDL when triglyceride exceeds 400 mg/dL  126* 135*  --    < > 150*   < > 137*   HDL 39* 32* 38*  --    < >  --    < > 28*   TRIG 189* 481* 246*  --    < >  --    < >  --    ALT  --   --  71* 34  --  46   < > 65   CR 0.87 0.82 0.86 0.99  --  0.91   < > 0.98   GFRESTIMATED 86 >90 88 75  --  83   < > 77   GFRESTBLACK >90 >90 >90 >90  African American GFR Calc    --  >90   GFR Calc     < > >90   POTASSIUM 4.3 3.8 4.2 4.3  --  3.9   < > 3.8   TSH  --   --   --   --   --   --   --  1.96    < > = values in this interval not displayed.      BP Readings from Last 3 Encounters:   05/05/20 (!) 146/64   04/28/20 (!) 145/70   02/04/20 136/68    Wt Readings from Last 3 Encounters:   05/05/20 78.7 kg (173 lb 9.6 oz)   04/28/20 78.9 kg (174 lb)   02/04/20 78.5 kg (173 lb)                    Reviewed and updated as needed this visit by Provider         Review of Systems   Constitutional, HEENT, cardiovascular, pulmonary, gi and gu systems are negative, except as otherwise noted.       Objective          Vitals:  No vitals were obtained today due to virtual visit.    healthy, alert and no distress  PSYCH: Alert and oriented times 3;  coherent speech, normal   rate and volume, able to articulate logical thoughts, able   to abstract reason, no tangential thoughts, no hallucinations   or delusions  His affect is normal  RESP: No cough, no audible wheezing, able to talk in full sentences  Remainder of exam unable to be completed due to telephone visits    Diagnostic Test Results:  Labs reviewed in Epic        Assessment/Plan:    Assessment & Plan     (D50.0) Iron deficiency anemia due to chronic blood loss  (primary encounter diagnosis)  Comment: it's not an emerging at all however it is a serious problem here  Plan: recommended ongoing waxing and waning and ongoing laboratory monitoring of hemoglobin and iron studies however in the big picture the underlying question is why is he still losing blood ? For this problem we need a gastroenterologist ! Recommended to contact [facility] and get in for further evaluation. Meanwhile monthly monitoring of laboratory studies recommended     (K57.32) Diverticulitis of colon  Comment: noted as a point of historical importance   Plan:     (K25.9) Gastric erosion, unspecified chronicity  Comment: noted as a point of historical importance   Plan:        Return in about 1 month (around 9/13/2020) for Lab Work.    Isaac Knutson MD  [facility]    Telephone MD visit     Call began at 11:24 AM   Call ended at 11:41 AM      Phone call duration:  17 minutes

## 2020-08-13 NOTE — PROGRESS NOTES
"Subjective     Chi Newman is a 75 year old male who presents to clinic today for the following health issues:    HPI       {SUPERLIST (Optional):749306}  {additonal problems for provider to add (Optional):979020}    {HIST REVIEW/ LINKS 2 (Optional):630238}    Reviewed and updated as needed this visit by Provider         Review of Systems   {ROS COMP (Optional):256945}      Objective    There were no vitals taken for this visit.  There is no height or weight on file to calculate BMI.  Physical Exam   {Exam List (Optional):017937}    {Diagnostic Test Results (Optional):755455::\"Diagnostic Test Results:\",\"Labs reviewed in Epic\"}        {PROVIDER CHARTING PREFERENCE:396225}    "

## 2020-08-13 NOTE — Clinical Note
"Send copy of my office visit note to \" last gastroenterological consultation with [facility] \""

## 2020-08-17 DIAGNOSIS — Z91.09 ENVIRONMENTAL ALLERGIES: ICD-10-CM

## 2020-08-17 RX ORDER — FLUTICASONE PROPIONATE 50 MCG
SPRAY, SUSPENSION (ML) NASAL
Qty: 16 G | Refills: 3 | Status: SHIPPED | OUTPATIENT
Start: 2020-08-17 | End: 2020-12-16

## 2020-08-20 ENCOUNTER — MYC MEDICAL ADVICE (OUTPATIENT)
Dept: INTERNAL MEDICINE | Facility: CLINIC | Age: 75
End: 2020-08-20

## 2020-08-24 ENCOUNTER — PATIENT OUTREACH (OUTPATIENT)
Dept: NURSING | Facility: CLINIC | Age: 75
End: 2020-08-24
Payer: COMMERCIAL

## 2020-08-24 ENCOUNTER — OFFICE VISIT (OUTPATIENT)
Dept: AUDIOLOGY | Facility: CLINIC | Age: 75
End: 2020-08-24
Payer: COMMERCIAL

## 2020-08-24 DIAGNOSIS — H90.3 SENSORINEURAL HEARING LOSS, BILATERAL: Primary | ICD-10-CM

## 2020-08-24 PROCEDURE — 92557 COMPREHENSIVE HEARING TEST: CPT | Performed by: AUDIOLOGIST

## 2020-08-24 PROCEDURE — 99207 ZZC NO CHARGE LOS: CPT | Performed by: AUDIOLOGIST

## 2020-08-24 PROCEDURE — 92550 TYMPANOMETRY & REFLEX THRESH: CPT | Performed by: AUDIOLOGIST

## 2020-08-24 NOTE — PROGRESS NOTES
AUDIOLOGY REPORT    SUBJECTIVE:  Chi Newman is a 75 year old male who was seen in the Audiology Clinic [facility] on 8/24/20 for audiologic evaluation, referred by Dr. Knutson.   The patient reports a gradual decline in hearing, inner ear infection that damaged the left ear hearing when he was 14 or 15 years old, and bilateral tinnitus when his allergies are really bad. 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 The patient denies  bilateral otalgia, bilateral drainage, bilateral aural fullness, family history of hearing loss, and dizziness. The patient notes difficulty with communication in a variety of listening situations. Patient was unaccompanied to today's visit.     Abuse Screening:  Do you feel unsafe at home or work/school? No  Do you feel threatened by someone? No  Does anyone try to keep you from having contact with others, or doing things outside of your home? No  Physical signs of abuse present? No     OBJECTIVE:    Otoscopic exam indicates ears are clear of cerumen bilaterally     Pure Tone Thresholds assessed using standard techniques  audiometry with good  reliability from 250-8000 Hz bilaterally using insert earphones and circumaural headphones     RIGHT:  normal and borderline-normal hearing sensitivity through 1000 Hz then a mild to moderate sensorineural hearing loss    LEFT:    normal and borderline-normal hearing sensitivity through 1000 Hz then a mild to moderate sensorineural hearing loss    NOTE: there is a slight asymmetry between ears between 3000 and 8000 Hzand a large asymmetry between ears at 6000 Hz only. Change in transducers did not merit a change in thresholds.     Tympanogram:    RIGHT: normal eardrum mobility    LEFT:   restricted eardrum mobility (Type As)    Reflexes (reported by stimulus ear): 1000 Hz  RIGHT: Ipsilateral is present at normal levels  RIGHT: Contralateral is absent at frequencies tested  LEFT:    Ipsilateral is absent at frequencies tested  LEFT:   Contralateral is present at normal levels    Speech Reception Threshold:    RIGHT: 30 dB HL    LEFT:   30 dB HL    Word Recognition Score:     RIGHT: 96% at 70 dB HL using NU-6 recorded word list.    LEFT:   92% at 70 dB HL using NU-6 recorded word list.    ASSESSMENT:   Bilateral slightly asymmetric sensorineural hearing loss      Today s results were discussed with the patient in detail.     PLAN:  Patient was counseled regarding hearing loss and impact on communication.  Patient is a candidate for amplification at this time. Handout on good communication strategies, and hearing aid use was given to patient. It is recommended that the patient see ENT for the asymmetry between ears. Following ENT appointment patient should return to audiology for a hearing aid consultation.  Please call this clinic with questions regarding these results or recommendations.    Bryce Knight CCC-A  Licensed Audiologist #[id number]  8/24/2020    CC: Dr. Knutson

## 2020-08-24 NOTE — LETTER
[facility]  Complex Care Plan  About Me:    Patient Name:  Chi Newman    YOB: 1945  Age:         75 year old   Hannah MRN:    [id number] Telephone Information:  Home Phone [phone]   Mobile [phone]       Address:  [address] [place] [address] Email address:  [email or web address]      Emergency Contact(s)    Name Relationship Lgl Grd Work Phone Home Phone Mobile Phone   1. TIFFANIE VARMA Daughter No  [phone]   2. DECLINE,PER PT Declined               Primary language:  English     needed? No   [facility]:  [phone] op. 1  Other communication barriers: Glasses  Preferred Method of Communication:  Mail  Current living arrangement:    Mobility Status/ Medical Equipment:      Health Maintenance  Health Maintenance Reviewed:      My Access Plan  Medical Emergency 911   Primary Clinic Line [facility] - [phone]   24 Hour Appointment Line [phone] or  [phone]) (toll-free)   24 Hour Nurse Line [phone] (toll-free)   Preferred Urgent Care     Preferred Hospital     Preferred Pharmacy [facility]  CHARLES Clark  [address]     Behavioral Health Crisis Line The National Suicide Prevention Lifeline at 1-826.263.5986 or 911             My Care Team Members  Patient Care Team       Relationship Specialty Notifications Start End    Isaac Knutson MD PCP - General Internal Medicine  7/2/13     Phone: [phone] Fax: [phone] [address] [place] [address]    Isaac Knutson MD Assigned PCP   10/27/13     Phone: [phone] Fax: [phone] [address] [facility] [address]    Fawad Robles MD  Ophthalmology  1/5/16     Phone: [phone] Fax: [phone]         [address] [place] [address]    Martha Lang, PRINCESS Lead Care Coordinator Primary Care - CC Admissions 4/27/20      "Phone: [phone]                 My Care Plans  Self Management and Treatment Plan  Goals and (Comments)  Goals        General    Medical (pt-stated)     Notes - Note edited  6/16/2020  2:48 PM by Martha Lang RN    Goal Statement: I want to feel \"like my old self again\"     Date Goal set: 4/27/2020    Barriers: Iron deficiency    Strengths: Motivated    Date to Achieve By: October 2020    Patient expressed understanding of goal: Yes    Action steps to achieve this goal:  1. I will have iron infusions as scheduled  2. I will eat a healthy diet  3. I will get adequate sleep  4. I will stay home, social distance, wear a mask and perform good handwashing to decrease exposure to covid-19 pandemic              Action Plans on File:                       Advance Care Plans/Directives Type:        My Medical and Care Information  Problem List   Patient Active Problem List   Diagnosis     Health Care Home     Hyperlipidemia with target LDL less than 130     Diverticulosis of colon     Elevated liver enzymes     Hypovitaminosis D     Environmental allergies     Seasonal allergic rhinitis     Pneumonia     BPH (benign prostatic hyperplasia)     Diverticulitis of colon     Chronic nonalcoholic liver disease     Microscopic hematuria     Umbilical hernia     Elevated glucose     Elevated prostate specific antigen (PSA)     Drusen of macula of both eyes     Posterior vitreous detachment of right eye     Nonexudative senile macular degeneration of retina     Senile nuclear sclerosis, bilateral     Slow transit constipation     Vitreous syneresis of left eye     PVD (posterior vitreous detachment), both eyes     History of diverticulitis of colon     History of partial colectomy     Meibomian gland dysfunction (MGD) of both eyes     Dry eye of right side     Gastric erosion, unspecified chronicity     Iron deficiency anemia due to chronic blood loss     Iron deficiency anemia, unspecified      Current Medications and " Allergies:  See printed Medication Report.    Care Coordination Start Date: 4/27/2020   Frequency of Care Coordination: monthly   Form Last Updated: 08/24/2020

## 2020-08-24 NOTE — PROGRESS NOTES
"Clinic Care Coordination Contact    Follow Up Progress Note      Assessment: Patient calling clinic care coordinator.    Patient states he is scheduled for a hearing test today. \"One appointment to check off his list\"     He states he has a \"terrible\" weekend. He states he had diarrhea all weekend and was feeling really weak and worn out. He is just starting to feel better today. He states he did not change his diet, but he had an avocado in the refrigerator for a day or so and he ate a 1/2 of one. He started having the diarrhea after that. He denies any signs of blood in the stool.  Patient is scheduled for a \"pill cam\" on Thursday 8/27/20 with [facility]. He is concerned if he should have this or cancel because of his diarrhea.  He is worried he will feel too weak after the prep.   Encouraged patient to keep this appointment. He can take the miralax prep with gatorade and he needs to make sure he is drinking plenty of clear fluids during the prep.      Patient states he continues to take the Protonix. He would like to stop taking this. He states it gives him a rash about every three weeks. He feels it give him headaches and muscle aches. We discussed that his headache may be from his allergies that he suffers with, but he does not feel that to be the cause.      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 He states they are one of the companies that has done well even with covid-19.        Goals addressed this encounter: Intermittent improvement  Goals Addressed                 This Visit's Progress      Medical (pt-stated)   40%     Goal Statement: I want to feel \"like my old self again\"     Date Goal set: 4/27/2020    Barriers: Iron deficiency    Strengths: Motivated    Date to Achieve By: October 2020    Patient expressed understanding of goal: Yes    Action steps to achieve this goal:  1. I will have iron infusions as scheduled  2. I will eat a healthy diet  3. I will get adequate sleep  4. I " will stay home, social distance, wear a mask and perform good handwashing to decrease exposure to covid-19 pandemic         Intervention/Education provided during outreach: As above.   Discussed the importance of getting a flu shot this year when available.      Outreach Frequency: monthly    Plan:   Patient will follow through with the pill cam.  Patient will have audiology test and opthalmology.     Care Coordinator will follow up in one month.    Martha Lang RN, [facility] - Primary Care Clinic RN Coordinator  [facility]   8/24/2020    2:53 PM  [phone]

## 2020-08-27 ENCOUNTER — PATIENT OUTREACH (OUTPATIENT)
Dept: CARE COORDINATION | Facility: CLINIC | Age: 75
End: 2020-08-27

## 2020-08-27 NOTE — PROGRESS NOTES
Clinic Care Coordination Contact    Patient left message for RN care coordinator on voicemail.     Patient states he cancelled his Pill Cam. He states he was too exhausted and did not feel that he could go through with it.    He states he rescheduled it for 9/17/2020.    RN care coordinator will follow up in on suraj, following Pill Cam.     Martha Lang RN, [facility] - Primary Care Clinic RN Coordinator  [facility]   8/27/2020    1:08 PM  [phone]

## 2020-09-01 DIAGNOSIS — D50.0 IRON DEFICIENCY ANEMIA DUE TO CHRONIC BLOOD LOSS: ICD-10-CM

## 2020-09-01 LAB
BASOPHILS # BLD AUTO: 0.1 10E9/L (ref 0–0.2)
BASOPHILS NFR BLD AUTO: 1 %
DIFFERENTIAL METHOD BLD: ABNORMAL
EOSINOPHIL # BLD AUTO: 0.2 10E9/L (ref 0–0.7)
EOSINOPHIL NFR BLD AUTO: 2.7 %
ERYTHROCYTE [DISTWIDTH] IN BLOOD BY AUTOMATED COUNT: 16 % (ref 10–15)
FERRITIN SERPL-MCNC: 10 NG/ML (ref 26–388)
HCT VFR BLD AUTO: 32.7 % (ref 40–53)
HGB BLD-MCNC: 10.2 G/DL (ref 13.3–17.7)
IRON SATN MFR SERPL: 7 % (ref 15–46)
IRON SERPL-MCNC: 24 UG/DL (ref 35–180)
LYMPHOCYTES # BLD AUTO: 1.3 10E9/L (ref 0.8–5.3)
LYMPHOCYTES NFR BLD AUTO: 22.2 %
MCH RBC QN AUTO: 24.2 PG (ref 26.5–33)
MCHC RBC AUTO-ENTMCNC: 31.2 G/DL (ref 31.5–36.5)
MCV RBC AUTO: 78 FL (ref 78–100)
MONOCYTES # BLD AUTO: 0.6 10E9/L (ref 0–1.3)
MONOCYTES NFR BLD AUTO: 10.2 %
NEUTROPHILS # BLD AUTO: 3.8 10E9/L (ref 1.6–8.3)
NEUTROPHILS NFR BLD AUTO: 63.9 %
PLATELET # BLD AUTO: 381 10E9/L (ref 150–450)
RBC # BLD AUTO: 4.21 10E12/L (ref 4.4–5.9)
TIBC SERPL-MCNC: 354 UG/DL (ref 240–430)
WBC # BLD AUTO: 5.9 10E9/L (ref 4–11)

## 2020-09-01 PROCEDURE — 83540 ASSAY OF IRON: CPT | Performed by: INTERNAL MEDICINE

## 2020-09-01 PROCEDURE — 85025 COMPLETE CBC W/AUTO DIFF WBC: CPT | Performed by: INTERNAL MEDICINE

## 2020-09-01 PROCEDURE — 82728 ASSAY OF FERRITIN: CPT | Performed by: INTERNAL MEDICINE

## 2020-09-01 PROCEDURE — 36415 COLL VENOUS BLD VENIPUNCTURE: CPT | Performed by: INTERNAL MEDICINE

## 2020-09-01 PROCEDURE — 83550 IRON BINDING TEST: CPT | Performed by: INTERNAL MEDICINE

## 2020-09-02 ENCOUNTER — MYC MEDICAL ADVICE (OUTPATIENT)
Dept: INTERNAL MEDICINE | Facility: CLINIC | Age: 75
End: 2020-09-02

## 2020-09-02 DIAGNOSIS — D50.0 IRON DEFICIENCY ANEMIA DUE TO CHRONIC BLOOD LOSS: Primary | ICD-10-CM

## 2020-09-18 ENCOUNTER — MYC MEDICAL ADVICE (OUTPATIENT)
Dept: INTERNAL MEDICINE | Facility: CLINIC | Age: 75
End: 2020-09-18

## 2020-09-24 ENCOUNTER — PATIENT OUTREACH (OUTPATIENT)
Dept: NURSING | Facility: CLINIC | Age: 75
End: 2020-09-24
Payer: COMMERCIAL

## 2020-09-24 NOTE — PROGRESS NOTES
"Clinic Care Coordination Contact    Follow Up Progress Note      Assessment: Returned call to patient.     Patient states he has completed his \"Pill Cam test\"   He states this was quite the ordeal. Took about 16 hours for the whole process. He feels it went smoothly.    Patient states he is feeling at baseline. A little more energy. He is eating well and his sleep has been good.     Patient continues to have monthly labs. He is scheduled next on 10/5/2020.    He is still waiting for the results f his pill cam test from [facility]. He plans to discuss the results with PCP as well once he has them.       Goals addressed this encounter:   Goals Addressed                 This Visit's Progress      Medical (pt-stated)   50%     Goal Statement: I want to feel \"like my old self again\"     Date Goal set: 4/27/2020    Barriers: Iron deficiency    Strengths: Motivated    Date to Achieve By: February 2020    Patient expressed understanding of goal: Yes    Action steps to achieve this goal:  1. I will have iron infusions as needed  2. I will eat a healthy diet  3. I will get adequate sleep  4. I will stay home, social distance, wear a mask and perform good handwashing to decrease exposure to covid-19 pandemic         Intervention/Education provided during outreach: Listening, reassurance     Outreach Frequency: monthly    Plan:   Patient will have labs drawn as scheduled.    Care Coordinator will follow up in one month.     Martha Lang RN, [facility] - Primary Care Clinic RN Coordinator  [facility]   9/24/2020      [phone]  "

## 2020-09-28 DIAGNOSIS — Z90.49 HISTORY OF PARTIAL COLECTOMY: ICD-10-CM

## 2020-09-29 RX ORDER — PANTOPRAZOLE SODIUM 40 MG/1
TABLET, DELAYED RELEASE ORAL
Qty: 90 TABLET | Refills: 1 | Status: SHIPPED | OUTPATIENT
Start: 2020-09-29 | End: 2021-03-26

## 2020-10-01 ENCOUNTER — TRANSFERRED RECORDS (OUTPATIENT)
Dept: HEALTH INFORMATION MANAGEMENT | Facility: CLINIC | Age: 75
End: 2020-10-01

## 2020-10-05 DIAGNOSIS — D50.0 IRON DEFICIENCY ANEMIA DUE TO CHRONIC BLOOD LOSS: ICD-10-CM

## 2020-10-05 LAB
BASOPHILS # BLD AUTO: 0.1 10E9/L (ref 0–0.2)
BASOPHILS NFR BLD AUTO: 0.9 %
DIFFERENTIAL METHOD BLD: ABNORMAL
EOSINOPHIL # BLD AUTO: 0.2 10E9/L (ref 0–0.7)
EOSINOPHIL NFR BLD AUTO: 2.4 %
ERYTHROCYTE [DISTWIDTH] IN BLOOD BY AUTOMATED COUNT: 15.7 % (ref 10–15)
FERRITIN SERPL-MCNC: 5 NG/ML (ref 26–388)
HCT VFR BLD AUTO: 28.9 % (ref 40–53)
HGB BLD-MCNC: 8.7 G/DL (ref 13.3–17.7)
IRON SATN MFR SERPL: 5 % (ref 15–46)
IRON SERPL-MCNC: 18 UG/DL (ref 35–180)
LYMPHOCYTES # BLD AUTO: 1.1 10E9/L (ref 0.8–5.3)
LYMPHOCYTES NFR BLD AUTO: 17.2 %
MCH RBC QN AUTO: 22.3 PG (ref 26.5–33)
MCHC RBC AUTO-ENTMCNC: 30.1 G/DL (ref 31.5–36.5)
MCV RBC AUTO: 74 FL (ref 78–100)
MONOCYTES # BLD AUTO: 0.6 10E9/L (ref 0–1.3)
MONOCYTES NFR BLD AUTO: 9.1 %
NEUTROPHILS # BLD AUTO: 4.5 10E9/L (ref 1.6–8.3)
NEUTROPHILS NFR BLD AUTO: 70.4 %
PLATELET # BLD AUTO: 425 10E9/L (ref 150–450)
RBC # BLD AUTO: 3.91 10E12/L (ref 4.4–5.9)
TIBC SERPL-MCNC: 373 UG/DL (ref 240–430)
WBC # BLD AUTO: 6.4 10E9/L (ref 4–11)

## 2020-10-05 PROCEDURE — 83540 ASSAY OF IRON: CPT | Performed by: INTERNAL MEDICINE

## 2020-10-05 PROCEDURE — 36415 COLL VENOUS BLD VENIPUNCTURE: CPT | Performed by: INTERNAL MEDICINE

## 2020-10-05 PROCEDURE — 85025 COMPLETE CBC W/AUTO DIFF WBC: CPT | Performed by: INTERNAL MEDICINE

## 2020-10-05 PROCEDURE — 82728 ASSAY OF FERRITIN: CPT | Performed by: INTERNAL MEDICINE

## 2020-10-05 PROCEDURE — 83550 IRON BINDING TEST: CPT | Performed by: INTERNAL MEDICINE

## 2020-10-06 ENCOUNTER — TELEPHONE (OUTPATIENT)
Dept: INTERNAL MEDICINE | Facility: CLINIC | Age: 75
End: 2020-10-06

## 2020-10-06 NOTE — TELEPHONE ENCOUNTER
Pt was notified of lab result message. Routing to provider to place orders for iron infusion. Pt requests to call him back to notify once order is placed. There are already standing orders for hgb/iron studies. Pt prefers to wait to see when he is able to schedule with infusion center and then he will schedule lab appt for re-check.      I am so sorry to have to tell you this but there is absolutely no doubt that there is recurring gastroenterological bleeding going on here. As you can see, the hemoglobin continues to drop and also the iron stores are diminishing. Unfortunately we are again at the point where for me , what makes the most sense is to schedule you for another iron transfusion with [facility]. This is not an emergency but it is urgent and I want to get this set up for next week. I would like to always keep your hemoglobin up above 10 and we have clearly dropped a good bit below that.     Lets aim for next week with ongoing monthly hemoglobin and iron testing from here and  For the foreseeable future       Notes to RN - reroute for signing or placement of orders tomorrow .     Laboratory studies are already ordered according to my understanding as standing orders.     Iron transfusion with Injectafer [Ferric Carboxymaltose Injection] to be scheduled     Isaac Knutson MD

## 2020-10-09 ENCOUNTER — INFUSION THERAPY VISIT (OUTPATIENT)
Dept: INFUSION THERAPY | Facility: CLINIC | Age: 75
End: 2020-10-09
Payer: COMMERCIAL

## 2020-10-09 VITALS
RESPIRATION RATE: 14 BRPM | HEART RATE: 59 BPM | WEIGHT: 174.4 LBS | TEMPERATURE: 98.5 F | OXYGEN SATURATION: 100 % | SYSTOLIC BLOOD PRESSURE: 138 MMHG | DIASTOLIC BLOOD PRESSURE: 74 MMHG | BODY MASS INDEX: 28.15 KG/M2

## 2020-10-09 DIAGNOSIS — K25.9 GASTRIC EROSION, UNSPECIFIED CHRONICITY: Primary | ICD-10-CM

## 2020-10-09 DIAGNOSIS — D50.0 IRON DEFICIENCY ANEMIA DUE TO CHRONIC BLOOD LOSS: ICD-10-CM

## 2020-10-09 PROCEDURE — 99207 PR NO CHARGE LOS: CPT

## 2020-10-09 PROCEDURE — 96365 THER/PROPH/DIAG IV INF INIT: CPT | Performed by: INTERNAL MEDICINE

## 2020-10-09 RX ORDER — HEPARIN SODIUM,PORCINE 10 UNIT/ML
5 VIAL (ML) INTRAVENOUS
Status: CANCELLED | OUTPATIENT
Start: 2020-10-16

## 2020-10-09 RX ORDER — HEPARIN SODIUM (PORCINE) LOCK FLUSH IV SOLN 100 UNIT/ML 100 UNIT/ML
5 SOLUTION INTRAVENOUS
Status: CANCELLED | OUTPATIENT
Start: 2020-10-16

## 2020-10-09 RX ADMIN — Medication 250 ML: at 09:11

## 2020-10-09 ASSESSMENT — PAIN SCALES - GENERAL: PAINLEVEL: NO PAIN (1)

## 2020-10-09 NOTE — PROGRESS NOTES
Infusion Nursing Note:  Chi Newman presents today for Injectafer dose 1 of 2.    Patient seen by provider today: No   present during visit today: Not Applicable.    Note: Reminded patient of potential side effects.    Intravenous Access:  Peripheral IV placed.    Treatment Conditions:  Patient is not on dialysis.      Post Infusion Assessment:  Patient tolerated infusion without incident.  Patient observed for 30 minutes post Injectafer per protocol.  Blood return noted pre and post infusion.  Site patent and intact, free from redness, edema or discomfort.  No evidence of extravasations.  Access discontinued per protocol.       Discharge Plan:   Patient will return 10/16/2020 for next appointment.   Patient discharged in stable condition accompanied by: self.  Departure Mode: Ambulatory.    Jimena Lomas RN

## 2020-10-16 ENCOUNTER — INFUSION THERAPY VISIT (OUTPATIENT)
Dept: INFUSION THERAPY | Facility: CLINIC | Age: 75
End: 2020-10-16
Payer: COMMERCIAL

## 2020-10-16 VITALS
HEART RATE: 57 BPM | WEIGHT: 172 LBS | RESPIRATION RATE: 18 BRPM | SYSTOLIC BLOOD PRESSURE: 143 MMHG | DIASTOLIC BLOOD PRESSURE: 76 MMHG | TEMPERATURE: 98.8 F | OXYGEN SATURATION: 100 % | BODY MASS INDEX: 27.76 KG/M2

## 2020-10-16 DIAGNOSIS — K25.9 GASTRIC EROSION, UNSPECIFIED CHRONICITY: Primary | ICD-10-CM

## 2020-10-16 DIAGNOSIS — D50.0 IRON DEFICIENCY ANEMIA DUE TO CHRONIC BLOOD LOSS: ICD-10-CM

## 2020-10-16 PROCEDURE — 99207 PR NO CHARGE LOS: CPT

## 2020-10-16 PROCEDURE — 96365 THER/PROPH/DIAG IV INF INIT: CPT | Performed by: INTERNAL MEDICINE

## 2020-10-16 RX ORDER — HEPARIN SODIUM,PORCINE 10 UNIT/ML
5 VIAL (ML) INTRAVENOUS
Status: CANCELLED | OUTPATIENT
Start: 2020-10-16

## 2020-10-16 RX ORDER — HEPARIN SODIUM (PORCINE) LOCK FLUSH IV SOLN 100 UNIT/ML 100 UNIT/ML
5 SOLUTION INTRAVENOUS
Status: CANCELLED | OUTPATIENT
Start: 2020-10-16

## 2020-10-16 RX ADMIN — Medication 250 ML: at 10:42

## 2020-10-16 ASSESSMENT — PAIN SCALES - GENERAL: PAINLEVEL: NO PAIN (0)

## 2020-10-16 NOTE — PROGRESS NOTES
Infusion Nursing Note:  Chi Newman presents today for Injectafer 2/2.    Patient seen by provider today: No   present during visit today: Not Applicable.    Note: N/A.    Intravenous Access:  Peripheral IV placed.    Treatment Conditions:  Not Applicable.      Post Infusion Assessment:  Patient tolerated infusion without incident.  Patient observed for 30 minutes post Injectafer per protocol.  Site patent and intact, free from redness, edema or discomfort.  No evidence of extravasations.  Access discontinued per protocol.       Discharge Plan:   Patient will return as needed for next appointment.   Patient discharged in stable condition accompanied by: self.  Departure Mode: Ambulatory.    Luann Chacon RN                        
The patient is a 54y Male complaining of fever.

## 2020-10-23 ENCOUNTER — ALLIED HEALTH/NURSE VISIT (OUTPATIENT)
Dept: NURSING | Facility: CLINIC | Age: 75
End: 2020-10-23
Payer: COMMERCIAL

## 2020-10-23 DIAGNOSIS — Z23 NEED FOR VACCINATION: Primary | ICD-10-CM

## 2020-10-23 PROCEDURE — G0010 ADMIN HEPATITIS B VACCINE: HCPCS

## 2020-10-23 PROCEDURE — 90746 HEPB VACCINE 3 DOSE ADULT IM: CPT

## 2020-10-26 ENCOUNTER — PATIENT OUTREACH (OUTPATIENT)
Dept: NURSING | Facility: CLINIC | Age: 75
End: 2020-10-26
Payer: COMMERCIAL

## 2020-10-26 NOTE — PROGRESS NOTES
"Clinic Care Coordination Contact    Follow Up Progress Note      Assessment: Outreach to patient.    Patient left after hour message with update of his condition.    Patient states he has \"had a head cold\" for the last three weeks. No fever, just runny nose. He has fall allergies.   He states he gets this every year, sometimes goes to a sinus infection but for now just a runny nose.    Patient had pill scan. 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  Options were discussed. A balloon endoscopy to get a better view of the area. It is difficult to treat with endoscopy so could require surgery. The decision was made with [facility] that they would continue to monitor. If he should have black stools he need to follow up asap with [facility]. Patient states he has not had any black stools in over six months.     Patient states he is very careful with his diet. He does not tolerate beef at all. Eats mostly chicken, he has some pork. He states he monitors his fiber closely. He does not want to get constipated but also does not want diarrhea.     Patient states he is feeling better after his last iron infusion. He states he was feeling \"very run down, fatigued and head achy\" before the infusion. He will have labs drawn again next Monday 11/2/20.     Patient states he has finally followed up on Hep B vaccinations. He will get flu shot in the next week. He is still not ready to go to the dentist. He states he will wait until next year as he will have better coverage at that time.  He has scheduled an eye exam. He states it has been two years and he knows he needs stronger correction.     Patient talks at length regarding covid-19. He is concerned with the increasing numbers and poor compliance of safety precautions among people he comes into contact with.      Patient states he is going to work at the [email or web address] on election day. He feels confident that they have safety measures in place to " "protect him fro exposure to covid-19.        Goals addressed this encounter:   Goals Addressed                 This Visit's Progress      Medical (pt-stated)   40%     Goal Statement: I want to feel \"like my old self again\"     Date Goal set: 4/27/2020    Barriers: Iron deficiency    Strengths: Motivated    Date to Achieve By: February 2020    Patient expressed understanding of goal: Yes    Action steps to achieve this goal:  1. I will have iron infusions as needed  2. I will eat a healthy diet  3. I will get adequate sleep  4. I will stay home, social distance, wear a mask and perform good handwashing to decrease exposure to covid-19 pandemic         Intervention/Education provided during outreach: Eat healthy, follow with routine monthly labs, be safe in the community      Outreach Frequency: monthly    Plan:   Patient will continue to feel stronger.    Care Coordinator will follow up in one month.     Martha Lang RN, [facility] - Primary Care Clinic RN Coordinator  [facility]   10/26/2020    10:57 AM  [phone]  "

## 2020-11-02 ENCOUNTER — TELEPHONE (OUTPATIENT)
Dept: FAMILY MEDICINE | Facility: CLINIC | Age: 75
End: 2020-11-02

## 2020-11-02 ENCOUNTER — VIRTUAL VISIT (OUTPATIENT)
Dept: URGENT CARE | Facility: CLINIC | Age: 75
End: 2020-11-02
Payer: COMMERCIAL

## 2020-11-02 DIAGNOSIS — J01.90 ACUTE SINUSITIS WITH SYMPTOMS > 10 DAYS: ICD-10-CM

## 2020-11-02 DIAGNOSIS — Z20.822 EXPOSURE TO COVID-19 VIRUS: Primary | ICD-10-CM

## 2020-11-02 PROCEDURE — 99214 OFFICE O/P EST MOD 30 MIN: CPT | Mod: 95 | Performed by: FAMILY MEDICINE

## 2020-11-02 RX ORDER — AMOXICILLIN 875 MG
875 TABLET ORAL 2 TIMES DAILY
Qty: 20 TABLET | Refills: 0 | Status: SHIPPED | OUTPATIENT
Start: 2020-11-02 | End: 2020-11-12

## 2020-11-02 NOTE — TELEPHONE ENCOUNTER
No cough or fever   Per patient, he has been feeling ill with cold symptoms for weeks but 4-5 days after getting his hep B shot, his symptoms worsened  Has body aches, sinus congestion, sore throat  Denies fever or cough  Has been using tylenol, cough drops, and Allegra without relief  He has also noticed that he has more trouble with focusing his vision since the symptoms worsened  Denies seeing double  He did admit that his symptoms of blurred vision and sinus congestion was improving as the phone conversation went on  Denies any known exposure to anyone who has been ill with cold/flu or Covid19 symptoms    Advised virtual appointment needed  He stated that he tried to do oncare visit but could not find the symptoms that pertained to him   Appointment schedule for a phone visit with virtual urgent care provider today instead    Jl Segura RN

## 2020-11-02 NOTE — PROGRESS NOTES
"The patient has been notified of following:     \"This telephone or video visit will be conducted via a call between you and your physician/provider. We have found that certain health care needs can be provided without the need for a physical exam.  This service lets us provide the care you need with a short phone conversation.  If a prescription is necessary we can send it directly to your pharmacy.  If lab work is needed we can place an order for that and you can then stop by our lab to have the test done at a later time.    Telephone or video visits are billed at different rates depending on your insurance coverage. During this emergency period, for some insurers they may be billed the same as an in-person visit.  Please reach out to your insurance provider with any questions.    Patient has given verbal consent for Telephone or video visit?  Yes  Subjective   HPI    Patient wondering about covid     In March was hospitalized because of anemia secondary to GI bleed.     Patient states in June he was wondering if he might have had covid then   Peculiar symptoms - he said he had some dizziness and sleeplessness headaches and loss of appetites that caused some veritgo-type symptoms   Symptoms lasted for 2-3 days then went away gradually over the course of 1-2 weeks  Patient did a free test for covid swab test was negative     Had lower counts in October so needed IV infusion Iron.   Started having runny nose 2-3 weeks ago   Thought it was cold initially  But then started having a lot more productive sinus congestion  Also feeling very tried  No fevers or chills chest pain or shortness of breath   No abdominal pain  No nausea vomiting or diarrhea     The last week started feeling worse  A lot of body aches   And nose still running headaches  Fatigue is better since having his iron infusion       Patient Active Problem List   Diagnosis     Health Care Home     Hyperlipidemia with target LDL less than 130     " Diverticulosis of colon     Elevated liver enzymes     Hypovitaminosis D     Environmental allergies     Seasonal allergic rhinitis     Pneumonia     BPH (benign prostatic hyperplasia)     Diverticulitis of colon     Chronic nonalcoholic liver disease     Microscopic hematuria     Umbilical hernia     Elevated glucose     Elevated prostate specific antigen (PSA)     Drusen of macula of both eyes     Posterior vitreous detachment of right eye     Nonexudative senile macular degeneration of retina     Senile nuclear sclerosis, bilateral     Slow transit constipation     Vitreous syneresis of left eye     PVD (posterior vitreous detachment), both eyes     History of diverticulitis of colon     History of partial colectomy     Meibomian gland dysfunction (MGD) of both eyes     Dry eye of right side     Gastric erosion, unspecified chronicity     Iron deficiency anemia due to chronic blood loss     Iron deficiency anemia, unspecified     Past Surgical History:   Procedure Laterality Date     ABDOMEN SURGERY      Diverticulitus     ARTHROSCOPY KNEE  96    right knee, LMT     ARTHROSCOPY KNEE  1988    left knee, MMT     BIOPSY      Colonoscopy / Endoscopy     COLONOSCOPY  10/7/2002    diverticulosis, due again in      COLONOSCOPY  2013     SMALL BOWEL RESECTION  3/19/2014    small bowel resection, lysis of adhesions and pelvic drain placement     TONSILLECTOMY & ADENOIDECTOMY  age of 15       Social History     Tobacco Use     Smoking status: Never Smoker     Smokeless tobacco: Never Used   Substance Use Topics     Alcohol use: Yes     Alcohol/week: 0.0 standard drinks     Comment: Occasional glass of wine and beer.     Family History   Problem Relation Age of Onset     Cancer Mother         Colon Cancer     Colon Cancer Mother          of Colon/Intestinal cancer     Diabetes Brother         My brother  from complications of diabetes     Glaucoma No family hx of      Macular Degeneration No family  hx of            Reviewed and updated as needed this visit by Provider   Allergies  Meds              Review of Systems   Constitutional, HEENT, cardiovascular, pulmonary, GI, , musculoskeletal, neuro, skin, endocrine and psych systems are negative, except as otherwise noted.       Objective   Reported vitals:  There were no vitals taken for this visit.   healthy, alert and no distress  PSYCH: Alert and oriented times 3; coherent speech, normal   rate and volume, able to articulate logical thoughts, able   to abstract reason, no tangential thoughts, no hallucinations   or delusions  His  affect is normal  RESP: No cough, no audible wheezing, able to talk in full sentences  Additional exam:  none  Remainder of exam unable to be completed due to telephone visits    Diagnostic Test Results:  Labs reviewed in Epic  none         Assessment/Plan:      ICD-10-CM    1. Exposure to COVID-19 virus  Z20.828 Symptomatic COVID-19 Virus (Coronavirus) by PCR   2. Acute sinusitis with symptoms > 10 days  J01.90 amoxicillin (AMOXIL) 875 MG tablet     Patient advised that he/she also has symptoms consistent with covid19  covid19 precautions advised  Patient referred for testing   Alarm signs or symptoms discussed, if present recommend go to ER   If with any symptoms of chest pain or shortness of breath, lightheadedness, palpitations, feeling like passing out or change and worsening in the quality of your symptoms, please proceed to ER. Recommend follow up with PCP in a few days for re-evaluation.     Patient has a history of anemia and his symptoms are improved since his infusion. He does not feel his anemia is playing a role right now but close follow up and low threshold for inperson evaluation if concerns.   His main concern is his persistent nasal congestion   Because persistent symptoms and double sickening phenomenon possible sinus infection, will treat with amoxicillin   Patient agrees, seems similar to his sinus symptoms  or infections but just a bit earlier this year than usual.       call duration:  28  minutes  Video: juan Vargas MD

## 2020-11-02 NOTE — TELEPHONE ENCOUNTER
Reason for Call:  Other call back    Detailed comments: pt had hep b vaccine on October 23rd. He currently has body aches and a lot of stuffiness in sinus. Sore throat. Informed pt of oncare but he declined.     Phone Number Patient can be reached at: Home number on file [phone] (home)    Best Time: any    Can we leave a detailed message on this number? YES    Call taken on 11/2/2020 at 9:02 AM by Breana Kahn

## 2020-11-09 DIAGNOSIS — D50.0 IRON DEFICIENCY ANEMIA DUE TO CHRONIC BLOOD LOSS: ICD-10-CM

## 2020-11-09 LAB
BASOPHILS # BLD AUTO: 0.1 10E9/L (ref 0–0.2)
BASOPHILS NFR BLD AUTO: 2 %
DACRYOCYTES BLD QL SMEAR: SLIGHT
DIFFERENTIAL METHOD BLD: ABNORMAL
ELLIPTOCYTES BLD QL SMEAR: SLIGHT
EOSINOPHIL # BLD AUTO: 0.2 10E9/L (ref 0–0.7)
EOSINOPHIL NFR BLD AUTO: 4 %
ERYTHROCYTE [DISTWIDTH] IN BLOOD BY AUTOMATED COUNT: ABNORMAL % (ref 10–15)
HCT VFR BLD AUTO: 42.4 % (ref 40–53)
HGB BLD-MCNC: 13.7 G/DL (ref 13.3–17.7)
LYMPHOCYTES # BLD AUTO: 1 10E9/L (ref 0.8–5.3)
LYMPHOCYTES NFR BLD AUTO: 17 %
MCH RBC QN AUTO: 26.7 PG (ref 26.5–33)
MCHC RBC AUTO-ENTMCNC: 32.3 G/DL (ref 31.5–36.5)
MCV RBC AUTO: 83 FL (ref 78–100)
METAMYELOCYTES # BLD: 0.1 10E9/L
METAMYELOCYTES NFR BLD MANUAL: 2 %
MONOCYTES # BLD AUTO: 0.1 10E9/L (ref 0–1.3)
MONOCYTES NFR BLD AUTO: 2 %
NEUTROPHILS # BLD AUTO: 4.6 10E9/L (ref 1.6–8.3)
NEUTROPHILS NFR BLD AUTO: 73 %
PLATELET # BLD AUTO: 239 10E9/L (ref 150–450)
PLATELET # BLD EST: ABNORMAL 10*3/UL
RBC # BLD AUTO: 5.13 10E12/L (ref 4.4–5.9)
WBC # BLD AUTO: 6.1 10E9/L (ref 4–11)

## 2020-11-09 PROCEDURE — 83550 IRON BINDING TEST: CPT | Performed by: INTERNAL MEDICINE

## 2020-11-09 PROCEDURE — 83540 ASSAY OF IRON: CPT | Performed by: INTERNAL MEDICINE

## 2020-11-09 PROCEDURE — 85025 COMPLETE CBC W/AUTO DIFF WBC: CPT | Performed by: INTERNAL MEDICINE

## 2020-11-09 PROCEDURE — 82728 ASSAY OF FERRITIN: CPT | Performed by: INTERNAL MEDICINE

## 2020-11-10 ENCOUNTER — MYC MEDICAL ADVICE (OUTPATIENT)
Dept: INTERNAL MEDICINE | Facility: CLINIC | Age: 75
End: 2020-11-10

## 2020-11-10 LAB
FERRITIN SERPL-MCNC: 242 NG/ML (ref 26–388)
IRON SATN MFR SERPL: 24 % (ref 15–46)
IRON SERPL-MCNC: 66 UG/DL (ref 35–180)
TIBC SERPL-MCNC: 276 UG/DL (ref 240–430)

## 2020-11-13 DIAGNOSIS — Z20.822 EXPOSURE TO COVID-19 VIRUS: ICD-10-CM

## 2020-11-13 PROCEDURE — U0003 INFECTIOUS AGENT DETECTION BY NUCLEIC ACID (DNA OR RNA); SEVERE ACUTE RESPIRATORY SYNDROME CORONAVIRUS 2 (SARS-COV-2) (CORONAVIRUS DISEASE [COVID-19]), AMPLIFIED PROBE TECHNIQUE, MAKING USE OF HIGH THROUGHPUT TECHNOLOGIES AS DESCRIBED BY CMS-2020-01-R: HCPCS | Performed by: FAMILY MEDICINE

## 2020-11-16 LAB
SARS-COV-2 RNA SPEC QL NAA+PROBE: NOT DETECTED
SPECIMEN SOURCE: NORMAL

## 2020-11-17 ENCOUNTER — NURSE TRIAGE (OUTPATIENT)
Dept: NURSING | Facility: CLINIC | Age: 75
End: 2020-11-17

## 2020-11-18 ENCOUNTER — VIRTUAL VISIT (OUTPATIENT)
Dept: URGENT CARE | Facility: CLINIC | Age: 75
End: 2020-11-18
Payer: COMMERCIAL

## 2020-11-18 DIAGNOSIS — J01.90 ACUTE SINUSITIS WITH SYMPTOMS > 10 DAYS: Primary | ICD-10-CM

## 2020-11-18 PROCEDURE — 99213 OFFICE O/P EST LOW 20 MIN: CPT | Mod: 95 | Performed by: NURSE PRACTITIONER

## 2020-11-18 RX ORDER — DOXYCYCLINE 100 MG/1
100 CAPSULE ORAL 2 TIMES DAILY
Qty: 20 CAPSULE | Refills: 0 | Status: SHIPPED | OUTPATIENT
Start: 2020-11-18 | End: 2020-11-28

## 2020-11-18 NOTE — PATIENT INSTRUCTIONS
Use Netti pot twice a day for the next 3-5 days. Use distilled water and the packets of powder included with the pot.    Take Sudafed (Pseudoephredine) 30mg every 6 hours while awake for the next 3-5 days. Do not take this if you have a history of high blood pressure or take medications for high blood pressure.    Take Tylenol or Ibuprofen as needed for fever or pain.    Drink Plenty of water and rest.

## 2020-11-18 NOTE — TELEPHONE ENCOUNTER
Triage Call:      Pt is calling stating he finished his coarse of antibiotics and his sinus infection returned. Pt would like another round of the antibiotic prescribed.  Pt stated for a while when he was on the Amoxacillin that his sinus infection was better but now is coming back since he finished the coarse of ABX. Pt has a headache earlier today that subsided with tylenol usage. Pt stated he has has a hx of sinus infections every year for 10 years around this time. Pt was advised to call back if his symptoms worsen, pt stated if they worsen he is just going to go the the ER. Pt was transferred to scheduling.     COVID 19 Nurse Triage Plan/Patient Instructions    Please be aware that novel coronavirus (COVID-19) may be circulating in the community. If you develop symptoms such as fever, cough, or SOB or if you have concerns about the presence of another infection including coronavirus (COVID-19), please contact your health care provider or visit [email or web address].     Disposition/Instructions    Virtual Visit with provider recommended. Reference Visit Selection Guide.    Thank you for taking steps to prevent the spread of this virus.  o Limit your contact with others.  o Wear a simple mask to cover your cough.  o Wash your hands well and often.    Resources    [facility]: About COVID-19: [email or web address]    CDC: What to Do If You're Sick: www.cdc.gov/coronavirus/2019-ncov/about/steps-when-sick.html    CDC: Ending Home Isolation: www.cdc.gov/coronavirus/2019-ncov/hcp/disposition-in-home-patients.html     CDC: Caring for Someone: www.cdc.gov/coronavirus/2019-ncov/if-you-are-sick/care-for-someone.html     [facility]: Interim Guidance for Hospital Discharge to Home: www.health.[facility].[place]/diseases/coronavirus/hcp/hospdischarge.pdf    [facility] clinical trials (COVID-19 research studies): [email or web address]-trials     Below are the COVID-19 hotlines at the [place]  Department of Health ([facility]). Interpreters are available.   o For health questions: Call 970-053-6422 or 1-414.538.7930 (7 a.m. to 7 p.m.)  o For questions about schools and childcare: Call 649-377-6868 or 1-647.184.3418 (7 a.m. to 7 p.m.)     Denise Fuentes RN Nursing Advisor 11/17/2020 9:18 PM     Additional Information    Negative: Severe difficulty breathing (e.g., struggling for each breath, speaks in single words)    Negative: Sounds like a life-threatening emergency to the triager    Negative: [1] Difficulty breathing AND [2] not from stuffy nose (e.g., not relieved by cleaning out the nose)    Negative: [1] SEVERE headache AND [2] fever    Negative: [1] Taking antibiotic > 24 hours AND [2] fever > 103 F (39.4 C)    Negative: [1] Redness or swelling on the cheek, forehead or around the eye AND [2] fever    Negative: Patient sounds very sick or weak to the triager    Negative: [1] SEVERE sinus pain AND [2] not improved 2 hours after pain medicine    Negative: [1] Redness or swelling on the cheek, forehead or around the eye AND [2] new since starting antibiotics    Negative: [1] Taking antibiotic > 48 hours (2 days) AND [2] fever persists    [1] Taking antibiotic > 72 hours (3 days) AND [2] sinus pain not improved    Protocols used: SINUS INFECTION ON ANTIBIOTIC FOLLOW-UP CALL-AMolly

## 2020-11-18 NOTE — PROGRESS NOTES
"  Chi Newman is a 75 year old male who is being evaluated via a billable telephone visit.      The patient has been notified of following:     \"This telephone visit will be conducted via a call between you and your physician/provider. We have found that certain health care needs can be provided without the need for a physical exam.  This service lets us provide the care you need with a short phone conversation.  If a prescription is necessary we can send it directly to your pharmacy.  If lab work is needed we can place an order for that and you can then stop by our lab to have the test done at a later time.    If during the course of the call the physician/provider feels a telephone visit is not appropriate, you will not be charged for this service.\"     Patient has given verbal consent for Telephone visit?  Yes    Chief Complaint:    Chief Complaint   Patient presents with     Sinus Problem       HPI: Chi Newman is an 75 year old male who calls with concerns that include sinus pain, facial pressure, nasal congestion, fatigued, headache for 3-4 weeks.    He had Amoxicillin prescribed on 2020 for sinusitis. He took ten days, didn't miss any doses He felt better, but as soon as he stopped the medication he. He gets sinus infections 1-2 times a year.    ROS:      A 10 point ROS is negative except as expressed in HPI.    Past Medical History  Past Medical History:   Diagnosis Date     Arthritis     Mother suffered from Arthritis.     Cancer (H)     Mother  of Cancer.     Depressive disorder     My mother suffered from Depression.     Diverticulosis of colon      Elevated liver enzymes      Hyperlipidemia LDL goal < 130      Hypovitaminosis D      Nonsenile cataract         Family History   Family History   Problem Relation Age of Onset     Cancer Mother         Colon Cancer     Colon Cancer Mother          of Colon/Intestinal cancer     Diabetes Brother         My brother  from complications of " diabetes     Glaucoma No family hx of      Macular Degeneration No family hx of        Social History  Social History     Socioeconomic History     Marital status: Single     Spouse name: Not on file     Number of children: 1     Years of education: Not on file     Highest education level: Not on file   Occupational History     Not on file   Social Needs     Financial resource strain: Not hard at all     Food insecurity     Worry: Never true     Inability: Never true     Transportation needs     Medical: No     Non-medical: No   Tobacco Use     Smoking status: Never Smoker     Smokeless tobacco: Never Used   Substance and Sexual Activity     Alcohol use: Yes     Alcohol/week: 0.0 standard drinks     Comment: Occasional glass of wine and beer.     Drug use: No     Sexual activity: Not Currently     Partners: Female     Birth control/protection: Condom   Lifestyle     Physical activity     Days per week: 3 days     Minutes per session: 40 min     Stress: Not on file   Relationships     Social connections     Talks on phone: Not on file     Gets together: Not on file     Attends Anabaptism service: Not on file     Active member of club or organization: Not on file     Attends meetings of clubs or organizations: Not on file     Relationship status: Not on file     Intimate partner violence     Fear of current or ex partner: Not on file     Emotionally abused: Not on file     Physically abused: Not on file     Forced sexual activity: Not on file   Other Topics Concern     Parent/sibling w/ CABG, MI or angioplasty before 65F 55M? No   Social History Narrative     Not on file        Surgical History:  Past Surgical History:   Procedure Laterality Date     ABDOMEN SURGERY      Diverticulitus     ARTHROSCOPY KNEE  7/12/96    right knee, LMT     ARTHROSCOPY KNEE  11/1988    left knee, MMT     BIOPSY      Colonoscopy / Endoscopy     COLONOSCOPY  10/7/2002    diverticulosis, due again in 2012     COLONOSCOPY  7/1/2013     SMALL  BOWEL RESECTION  3/19/2014    small bowel resection, lysis of adhesions and pelvic drain placement     TONSILLECTOMY & ADENOIDECTOMY  age of 15          Allergies:  Allergies   Allergen Reactions     Atorvastatin      Vytorin      Vancomycin Rash     Rash/hives        Current Meds:    Current Outpatient Medications:      doxycycline hyclate (VIBRAMYCIN) 100 MG capsule, Take 1 capsule (100 mg) by mouth 2 times daily for 10 days, Disp: 20 capsule, Rfl: 0     acetaminophen (TYLENOL) 500 MG tablet, Take 500-1,000 mg by mouth every 6 hours as needed, Disp: , Rfl:      cholecalciferol 25 MCG (1000 UT) TABS, Take 3,000 Units by mouth daily, Disp: 300 tablet, Rfl: 2     fexofenadine (ALLEGRA) 180 MG tablet, Take 1 tablet (180 mg) by mouth daily Take 180 mg by mouth once daily. Indications: SEASONAL ALLERGIC RHINITIS, Disp: 90 tablet, Rfl: 3     fluticasone (FLONASE) 50 MCG/ACT nasal spray, USE ONE TO TWO SPRAYS IN EACH NOSTRIL EVERY DAY, Disp: 16 g, Rfl: 3     hypromellose (GENTEAL) ophthalmic gel 0.3%, Place 1 drop into both eyes every hour as needed for dry eyes, Disp: , Rfl:      pantoprazole (PROTONIX) 40 MG EC tablet, TAKE ONE TABLET BY MOUTH ONCE DAILY, Disp: 90 tablet, Rfl: 1     Propylene Glycol (SYSTANE BALANCE OP), Apply 1 drop to eye 4 times daily, Disp: , Rfl:      STATIN NOT PRESCRIBED (INTENTIONAL), Please choose reason not prescribed, below (Patient not taking: Reported on 5/5/2020), Disp: , Rfl:      triamcinolone (KENALOG) 0.1 % external cream, Apply topically 2 times daily, Disp: 453.6 g, Rfl: 0     PHYSICAL EXAM:     Patient is alert and oriented. Able to speak in full sentences. No wheezing or cough heard during telephone conversation. Mood is appropriate.        ASSESSMENT:       ICD-10-CM    1. Acute sinusitis with symptoms > 10 days  J01.90 doxycycline hyclate (VIBRAMYCIN) 100 MG capsule     Will try Doxycycline, if not effective see ENT. Nasal irrigation and decongestants.    Worrisome symptoms  discussed with instructions to go to the ED.  Patient verbalized understanding and agreed with this plan.     Molly Trinidad CNP  11/18/2020, 8:46 AM      Phone call duration:  14 minutes   detailed exam warm and dry

## 2020-11-30 ENCOUNTER — MYC MEDICAL ADVICE (OUTPATIENT)
Dept: INTERNAL MEDICINE | Facility: CLINIC | Age: 75
End: 2020-11-30

## 2020-11-30 ENCOUNTER — NURSE TRIAGE (OUTPATIENT)
Dept: NURSING | Facility: CLINIC | Age: 75
End: 2020-11-30

## 2020-12-01 ENCOUNTER — TELEPHONE (OUTPATIENT)
Dept: FAMILY MEDICINE | Facility: CLINIC | Age: 75
End: 2020-12-01

## 2020-12-01 DIAGNOSIS — D50.0 IRON DEFICIENCY ANEMIA DUE TO CHRONIC BLOOD LOSS: ICD-10-CM

## 2020-12-01 LAB
ANISOCYTOSIS BLD QL SMEAR: SLIGHT
BASOPHILS # BLD AUTO: 0.1 10E9/L (ref 0–0.2)
BASOPHILS # BLD AUTO: NORMAL 10E9/L (ref 0–0.2)
BASOPHILS NFR BLD AUTO: 1 %
BASOPHILS NFR BLD AUTO: NORMAL %
DIFFERENTIAL METHOD BLD: ABNORMAL
DIFFERENTIAL METHOD BLD: NORMAL
ELLIPTOCYTES BLD QL SMEAR: SLIGHT
EOSINOPHIL # BLD AUTO: 0.1 10E9/L (ref 0–0.7)
EOSINOPHIL # BLD AUTO: NORMAL 10E9/L (ref 0–0.7)
EOSINOPHIL NFR BLD AUTO: 2 %
EOSINOPHIL NFR BLD AUTO: NORMAL %
ERYTHROCYTE [DISTWIDTH] IN BLOOD BY AUTOMATED COUNT: 22.3 % (ref 10–15)
ERYTHROCYTE [DISTWIDTH] IN BLOOD BY AUTOMATED COUNT: NORMAL % (ref 10–15)
FERRITIN SERPL-MCNC: 72 NG/ML (ref 26–388)
HCT VFR BLD AUTO: 42.3 % (ref 40–53)
HCT VFR BLD AUTO: NORMAL % (ref 40–53)
HGB BLD-MCNC: 14 G/DL (ref 13.3–17.7)
HGB BLD-MCNC: NORMAL G/DL (ref 13.3–17.7)
IRON SATN MFR SERPL: 17 % (ref 15–46)
IRON SERPL-MCNC: 49 UG/DL (ref 35–180)
LYMPHOCYTES # BLD AUTO: 1.4 10E9/L (ref 0.8–5.3)
LYMPHOCYTES # BLD AUTO: NORMAL 10E9/L (ref 0.8–5.3)
LYMPHOCYTES NFR BLD AUTO: 20 %
LYMPHOCYTES NFR BLD AUTO: NORMAL %
MACROCYTES BLD QL SMEAR: PRESENT
MCH RBC QN AUTO: 27.2 PG (ref 26.5–33)
MCH RBC QN AUTO: NORMAL PG (ref 26.5–33)
MCHC RBC AUTO-ENTMCNC: 33.1 G/DL (ref 31.5–36.5)
MCHC RBC AUTO-ENTMCNC: NORMAL G/DL (ref 31.5–36.5)
MCV RBC AUTO: 82 FL (ref 78–100)
MCV RBC AUTO: NORMAL FL (ref 78–100)
MONOCYTES # BLD AUTO: 0.6 10E9/L (ref 0–1.3)
MONOCYTES # BLD AUTO: NORMAL 10E9/L (ref 0–1.3)
MONOCYTES NFR BLD AUTO: 9 %
MONOCYTES NFR BLD AUTO: NORMAL %
NEUTROPHILS # BLD AUTO: 4.6 10E9/L (ref 1.6–8.3)
NEUTROPHILS # BLD AUTO: NORMAL 10E9/L (ref 1.6–8.3)
NEUTROPHILS NFR BLD AUTO: 68 %
NEUTROPHILS NFR BLD AUTO: NORMAL %
PLATELET # BLD AUTO: 297 10E9/L (ref 150–450)
PLATELET # BLD AUTO: NORMAL 10E9/L (ref 150–450)
PLATELET # BLD EST: ABNORMAL 10*3/UL
RBC # BLD AUTO: 5.14 10E12/L (ref 4.4–5.9)
RBC # BLD AUTO: NORMAL 10E12/L (ref 4.4–5.9)
TIBC SERPL-MCNC: 295 UG/DL (ref 240–430)
WBC # BLD AUTO: 6.8 10E9/L (ref 4–11)
WBC # BLD AUTO: NORMAL 10E9/L (ref 4–11)

## 2020-12-01 PROCEDURE — 83550 IRON BINDING TEST: CPT | Performed by: INTERNAL MEDICINE

## 2020-12-01 PROCEDURE — 83540 ASSAY OF IRON: CPT | Performed by: INTERNAL MEDICINE

## 2020-12-01 PROCEDURE — 85025 COMPLETE CBC W/AUTO DIFF WBC: CPT | Performed by: INTERNAL MEDICINE

## 2020-12-01 PROCEDURE — 82728 ASSAY OF FERRITIN: CPT | Performed by: INTERNAL MEDICINE

## 2020-12-01 NOTE — TELEPHONE ENCOUNTER
"Pt called in states he had sinus infection in the past.  The Pt states he antibiotic for the symptom 2 times.  Treated with  2 types of antibiotic because of the symptom is not go completely.  The Pt states he has abdominal pain.  The pain started on 11/26/20.  Pt was taking antibiotic that time.  Pt stop antibiotic taking on 11/27/30.  The Pt has abdominal pain.  The pain 3/10 on the scale.  The pain is come and go.  No diarrhea,   No vomit.  Pt feel much better after BM  Pt eating and drinking okay.  No bleed in BM.  No fever.  The disposition is to be seen with in 24 hours  Care advice given per protocol.  Patient agrees with care advice given.   Agreed to call back if he has additional symptoms or questions.      Patricio [place] Nurse Advisor 11/30/2020 8:36 PM      Additional Information    Negative: Shock suspected (e.g., cold/pale/clammy skin, too weak to stand, low BP, rapid pulse)    Negative: Difficult to awaken or acting confused (e.g., disoriented, slurred speech)    Negative: Passed out (i.e., lost consciousness, collapsed and was not responding)    Negative: Sounds like a life-threatening emergency to the triager    Negative: Chest pain    Negative: Pain is mainly in upper abdomen  (if needed ask: \"is it mainly above the belly button?\")    Negative: Followed an abdomen (stomach) injury    Negative: [1] SEVERE pain (e.g., excruciating) AND [2] present > 1 hour    Negative: [1] SEVERE pain AND [2] age > 60    Negative: [1] Vomiting AND [2] contains red blood or black (\"coffee ground\") material  (Exception: few red streaks in vomit that only happened once)    Negative: Blood in bowel movements  (Exception: Blood on surface of BM with constipation)    Negative: Black or tarry bowel movements  (Exception: chronic-unchanged  black-grey bowel movements AND is taking iron pills or Pepto-bismol)    Negative: [1] Unable to urinate (or only a few drops) > 4 hours AND [2] bladder feels very full (e.g., " palpable bladder or strong urge to urinate)    Negative: Shock suspected (e.g., cold/pale/clammy skin, too weak to stand, low BP, rapid pulse)    Negative: Difficult to awaken or acting confused (e.g., disoriented, slurred speech)    Negative: Sounds like a life-threatening emergency to the triager    Negative: Vomiting also present and worse than the diarrhea    Negative: [1] Blood in stool AND [2] without diarrhea    Negative: Diarrhea in a cancer patient who is currently (or recently) receiving chemotherapy or radiation therapy, or cancer patient who has metastatic or end-stage cancer and is receiving palliative care    Negative: [1] Pain in the scrotum or testicle AND [2] present > 1 hour    Negative: Patient sounds very sick or weak to the triager    Negative: [1] MILD-MODERATE pain AND [2] constant AND [3] present > 2 hours    Negative: [1] Vomiting AND [2] abdomen looks much more swollen than usual    Negative: [1] Vomiting AND [2] contains bile (green color)    Negative: White of the eyes have turned yellow (i.e., jaundice)    Negative: Fever > 103 F (39.4 C)    Negative: [1] Fever > 101 F (38.3 C) AND [2] age > 60    Negative: [1] Fever > 100.0 F (37.8 C) AND [2] bedridden (e.g., nursing home patient, CVA, chronic illness, recovering from surgery)    Negative: [1] Fever > 100.0 F (37.8 C) AND [2] diabetes mellitus or weak immune system (e.g., HIV positive, cancer chemo, splenectomy, organ transplant, chronic steroids)    Negative: [1] SEVERE pain AND [2] present < 1 hour    [1] MILD pain (e.g., does not interfere with normal activities) AND [2] pain comes and goes (cramps) [3] present > 48 hours    Negative: [1] MODERATE pain (e.g., interferes with normal activities) AND [2] pain comes and goes (cramps) AND [3] present > 24 hours  (Exception: pain with Vomiting or Diarrhea - see that Guideline)    Protocols used: ABDOMINAL PAIN - MALE-A-AH, DIARRHEA-A-AH

## 2020-12-02 ENCOUNTER — PATIENT OUTREACH (OUTPATIENT)
Dept: NURSING | Facility: CLINIC | Age: 75
End: 2020-12-02
Payer: COMMERCIAL

## 2020-12-02 NOTE — PROGRESS NOTES
"Clinic Care Coordination Contact    Follow Up Progress Note      Assessment: Outreach to patient.    Patient states he had a terrible week before and during Thanksgiving. He states he developed a bad sinus infection. He was initially on Amoxicillin and then Doxycycline for 10 days. He states he developed terrible diarrhea and abdominal pain. He was not able to eat and felt \"really awful\"  Patient states he called Urgent Care. They told him \"to calm down\" and gave him instructions on what to eat. Told him to stop the Doxycycline. He was on day nine so missed the last day.     Patient reviewed the diet. He states the diarrhea has stopped. He still \"feels sore in his gut\" but no pain. The gas has improved. Discussed adding a baked chicken breast with his rice for dinner. He states his appetite is much improved. He is still feeling tired and a little weak.    Patient got his lab results back and they looked good. He was pleased.     Patient states he still needs to get his flu shot, but will wait a week or so until he feels stronger.     Patient states he has been going to covid-19 vaccine websites and looking at testing results. He talks at length and detail about the vaccines and covid virus.     He states he daughter and all of the family had covid-19 and she can attest to how awful it was.  Patient is very involved in any news or recommendations from the scientists.       Goals addressed this encounter: declined due to sinus infection and  diarrhea  Goals Addressed                 This Visit's Progress      Medical (pt-stated)   30%     Goal Statement: I want to feel \"like my old self again\"     Date Goal set: 4/27/2020    Barriers: Iron deficiency    Strengths: Motivated    Date to Achieve By: February 2020    Patient expressed understanding of goal: Yes    Action steps to achieve this goal:  1. I will have iron infusions as needed  2. I will eat a healthy diet  3. I will get adequate sleep  4. I will stay home, " social distance, wear a mask and perform good handwashing to decrease exposure to covid-19 pandemic         Intervention/Education provided during outreach: Diet, Lots of listening, reassurance     Outreach Frequency: monthly    Plan:   Patient will get flu shot  Care Coordinator will follow up in one month.    Martha Lang RN, [facility] RN Coordinator  [facility]   12/2/2020    3:29 PM  [phone]

## 2020-12-03 NOTE — TELEPHONE ENCOUNTER
Patient has questions about his labs, he has standing orders for Ferritin and hgb due to an ulcer.  He was last hospitalized on 4/23/20 for blood loss anemia and GI hemorrhage.  Patient has been coming in month for labs.  He is wondering how often he should be checking these labs so he can schedule for the upcoming year.   Rashmi Maria RN

## 2020-12-03 NOTE — TELEPHONE ENCOUNTER
We have reviewed these questions before.     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 There is no clear cut recipe to follow and this is a matter of discretion and the art of practicing the Practice of Medicine     In my opinion we can step back to every other month for the next 6 months and if these checks remain all fine we can drop back to every third month after that,    Please let me know if patient has further questions for me     Isaac Knutson MD

## 2020-12-04 NOTE — TELEPHONE ENCOUNTER
Patient notified of providers message as written.  Patient verbalized understanding, no further questions or concerns.  Lab appointment scheduled.   Rashmi Maria RN

## 2020-12-11 ENCOUNTER — OFFICE VISIT (OUTPATIENT)
Dept: OPHTHALMOLOGY | Facility: CLINIC | Age: 75
End: 2020-12-11
Payer: COMMERCIAL

## 2020-12-11 DIAGNOSIS — H35.3131 EARLY DRY STAGE NONEXUDATIVE AGE-RELATED MACULAR DEGENERATION OF BOTH EYES: ICD-10-CM

## 2020-12-11 DIAGNOSIS — H04.123 DRY EYE SYNDROME, BILATERAL: ICD-10-CM

## 2020-12-11 DIAGNOSIS — H52.223 MYOPIA OF BOTH EYES WITH REGULAR ASTIGMATISM AND PRESBYOPIA: ICD-10-CM

## 2020-12-11 DIAGNOSIS — Z01.00 EXAMINATION OF EYES AND VISION: Primary | ICD-10-CM

## 2020-12-11 DIAGNOSIS — H52.4 MYOPIA OF BOTH EYES WITH REGULAR ASTIGMATISM AND PRESBYOPIA: ICD-10-CM

## 2020-12-11 DIAGNOSIS — H25.13 NUCLEAR SCLEROTIC CATARACT OF BOTH EYES: ICD-10-CM

## 2020-12-11 DIAGNOSIS — H43.813 PVD (POSTERIOR VITREOUS DETACHMENT), BOTH EYES: ICD-10-CM

## 2020-12-11 DIAGNOSIS — H52.13 MYOPIA OF BOTH EYES WITH REGULAR ASTIGMATISM AND PRESBYOPIA: ICD-10-CM

## 2020-12-11 PROCEDURE — 92014 COMPRE OPH EXAM EST PT 1/>: CPT | Performed by: STUDENT IN AN ORGANIZED HEALTH CARE EDUCATION/TRAINING PROGRAM

## 2020-12-11 PROCEDURE — 92015 DETERMINE REFRACTIVE STATE: CPT | Performed by: STUDENT IN AN ORGANIZED HEALTH CARE EDUCATION/TRAINING PROGRAM

## 2020-12-11 ASSESSMENT — TONOMETRY
OD_IOP_MMHG: 16
OS_IOP_MMHG: 15
IOP_METHOD: APPLANATION

## 2020-12-11 ASSESSMENT — REFRACTION_MANIFEST
OD_ADD: +2.75
OD_CYLINDER: +1.00
OD_AXIS: 010
OS_SPHERE: -1.50
OS_CYLINDER: SPHERE
OD_SPHERE: -1.50
OS_ADD: +2.75

## 2020-12-11 ASSESSMENT — CONF VISUAL FIELD
OD_NORMAL: 1
OS_NORMAL: 1
METHOD: COUNTING FINGERS

## 2020-12-11 ASSESSMENT — REFRACTION_WEARINGRX
OD_CYLINDER: +1.00
OD_SPHERE: -1.75
OS_SPHERE: -2.00
OS_AXIS: 017
OS_ADD: +2.50
OD_ADD: +2.50
SPECS_TYPE: PAL
OS_CYLINDER: +0.25
OD_AXIS: 020

## 2020-12-11 ASSESSMENT — VISUAL ACUITY
METHOD: SNELLEN - LINEAR
OD_CC: 20/25
OS_CC: 20/20
OD_CC+: -2
CORRECTION_TYPE: GLASSES

## 2020-12-11 ASSESSMENT — CUP TO DISC RATIO
OS_RATIO: 0.5
OD_RATIO: 0.5

## 2020-12-11 ASSESSMENT — SLIT LAMP EXAM - LIDS
COMMENTS: 1+ MEIBOMIAN GLAND DYSFUNCTION
COMMENTS: 1+ MEIBOMIAN GLAND DYSFUNCTION

## 2020-12-11 ASSESSMENT — EXTERNAL EXAM - RIGHT EYE: OD_EXAM: NORMAL

## 2020-12-11 ASSESSMENT — EXTERNAL EXAM - LEFT EYE: OS_EXAM: NORMAL

## 2020-12-11 NOTE — PATIENT INSTRUCTIONS
"Continue artificial tears up to four times a day as needed (Refresh Optive, Systane Balance, TheraTears, or generic artificial tears are ok. Avoid \"get the red out\" drops). And continue gel drop at bedtime in both eyes.      Glasses prescription given - optional to update     Recommend taking an eye vitamin with AREDS2 on the packaging (like Preservision, iCaps, or generic). Follow dosing directions on the package.      Connie Martinez MD  [phone]  "

## 2020-12-11 NOTE — LETTER
"    12/11/2020         RE: Chi Newman  [address] [place] [address]  [place] [address]        Dear Colleague,    Thank you for referring your patient, Chi Newman, to the [facility]. Please see a copy of my visit note below.     Current Eye Medications:  Systane three times a day both eyes, Gel drop at bedtime right eye eyes. Not taking any eye vitamins by mouth.     Subjective:  Complete eye exam. Vision is down distance and near both eyes, since March. Has to take break from computer every 20 minutes, because eyes get tired. Has a lot of floaters both eyes, right eye has the worse floaters (unchanged for last 3 years). Not having any light flashes or curtain effect. No eye pain or discomfort in either eye. Patient was hoping to get another Rx for Tobradex, in case he gets infection.       Objective:  See Ophthalmology Exam.       Assessment:  Chi Newman is a 75 year old male who presents with:   Encounter Diagnoses   Name Primary?     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      Nuclear sclerotic cataract of both eyes Not visually significant      PVD (posterior vitreous detachment), both eyes      Dry eye syndrome, bilateral        Plan:  Continue artificial tears up to four times a day as needed (Refresh Optive, Systane Balance, TheraTears, or generic artificial tears are ok. Avoid \"get the red out\" drops). And continue gel drop at bedtime in both eyes.      Glasses prescription given - optional to update     Recommend taking an eye vitamin with AREDS2 on the packaging (like Preservision, iCaps, or generic). Follow dosing directions on the package.      Connie Martinez MD  [phone]        Again, thank you for allowing me to participate in the care of your patient.        Sincerely,        Connie Martinez MD    "

## 2020-12-11 NOTE — PROGRESS NOTES
" Current Eye Medications:  Systane three times a day both eyes, Gel drop at bedtime right eye eyes. Not taking any eye vitamins by mouth.     Subjective:  Complete eye exam. Vision is down distance and near both eyes, since March. Has to take break from computer every 20 minutes, because eyes get tired. Has a lot of floaters both eyes, right eye has the worse floaters (unchanged for last 3 years). Not having any light flashes or curtain effect. No eye pain or discomfort in either eye. Patient was hoping to get another Rx for Tobradex, in case he gets infection.       Objective:  See Ophthalmology Exam.       Assessment:  Chi Newman is a 75 year old male who presents with:   Encounter Diagnoses   Name Primary?     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      Nuclear sclerotic cataract of both eyes Not visually significant      PVD (posterior vitreous detachment), both eyes      Dry eye syndrome, bilateral        Plan:  Continue artificial tears up to four times a day as needed (Refresh Optive, Systane Balance, TheraTears, or generic artificial tears are ok. Avoid \"get the red out\" drops). And continue gel drop at bedtime in both eyes.      Glasses prescription given - optional to update     Recommend taking an eye vitamin with AREDS2 on the packaging (like Preservision, iCaps, or generic). Follow dosing directions on the package.      Connie Martinez MD  [phone]    "

## 2020-12-13 ENCOUNTER — HEALTH MAINTENANCE LETTER (OUTPATIENT)
Age: 75
End: 2020-12-13

## 2020-12-15 ENCOUNTER — MYC REFILL (OUTPATIENT)
Dept: INTERNAL MEDICINE | Facility: CLINIC | Age: 75
End: 2020-12-15

## 2020-12-15 DIAGNOSIS — Z91.09 ENVIRONMENTAL ALLERGIES: ICD-10-CM

## 2020-12-15 RX ORDER — FLUTICASONE PROPIONATE 50 MCG
SPRAY, SUSPENSION (ML) NASAL
Qty: 16 G | Refills: 3 | Status: CANCELLED | OUTPATIENT
Start: 2020-12-15

## 2020-12-16 ENCOUNTER — PATIENT OUTREACH (OUTPATIENT)
Dept: CARE COORDINATION | Facility: CLINIC | Age: 75
End: 2020-12-16

## 2020-12-16 ENCOUNTER — PATIENT OUTREACH (OUTPATIENT)
Dept: NURSING | Facility: CLINIC | Age: 75
End: 2020-12-16
Payer: COMMERCIAL

## 2020-12-16 ENCOUNTER — MYC MEDICAL ADVICE (OUTPATIENT)
Dept: INTERNAL MEDICINE | Facility: CLINIC | Age: 75
End: 2020-12-16

## 2020-12-16 RX ORDER — FLUTICASONE PROPIONATE 50 MCG
SPRAY, SUSPENSION (ML) NASAL
Qty: 16 G | Refills: 3 | Status: SHIPPED | OUTPATIENT
Start: 2020-12-16 | End: 2021-04-20

## 2020-12-16 NOTE — PROGRESS NOTES
"Clinic Care Coordination Contact    Follow Up Progress Note      Assessment: Patient returns call to clinic care coordinator.    Patient states he was feeling a little better this am with BM.  He states he did go to the grocery store and get some fruit and vegetables.   We discussed increasing his bran in take. He needs to do this gradually as he often then has an episode of diarrhea if too much change.     We also discussed that his activity has decreased. He states he was going to the health club several times a week to walk on the treadmill. The health clubs have been closed for a month now due to the pandemic. He states he will try walking in the [facility]  [place] instead, He does not like to walk in the cold and wind. He states he is thinking about getting his own treadmill.    Patient talks about covid-19 and wonders when he might be able to get the vaccine. Advised there is currently no time table for the general population but anticipate that it will be several months before enough vaccine is available. Discuss the need to continue to wear a mask, social distance and avoid crowds.   Patient states he hopes people can comply with the recommendations and get the virus under control in [place].      Goals addressed this encounter: Stable  Goals Addressed                 This Visit's Progress      Medical (pt-stated)   40%     Goal Statement: I want to feel \"like my old self again\"     Date Goal set: 4/27/2020    Barriers: Iron deficiency    Strengths: Motivated    Date to Achieve By: February 2020    Patient expressed understanding of goal: Yes    Action steps to achieve this goal:  1. I will have iron infusions as needed  2. I will eat a healthy diet  3. I will get adequate sleep  4. I will stay home, social distance, wear a mask and perform good handwashing to decrease exposure to covid-19 pandemic         Intervention/Education provided during outreach: Use senna as a stool softner. Use Miralax only if no " BM for two days or more.      Plan:   Patient will have less constipation.    Care Coordinator will follow up in one month.     Martha Lang RN, [facility] - Primary Care Clinic RN Coordinator  [facility]   12/16/2020     [phone]

## 2020-12-16 NOTE — PROGRESS NOTES
"Clinic Care Coordination Contact    Follow Up Progress Note      Assessment: RN care coordinator received after hours voicemail from patient.     Patient states he is having some difficulty with constipation. He is having a daily BM, but it is more difficult. He states he diet is the same, no changes. He feels he is drinking the same. Patient is wondering what he might be able to take if needed.     Returned call to patient and left message . Advised he can increase fiber in his diet, eats fresh fruits and vegetables. Increase fluid intake.    Advised patient could use senna as a stool softner if more is needed he could try Miralax once.     Goals addressed this encounter:   Goals Addressed                 This Visit's Progress      Medical (pt-stated)   40%     Goal Statement: I want to feel \"like my old self again\"     Date Goal set: 4/27/2020    Barriers: Iron deficiency    Strengths: Motivated    Date to Achieve By: February 2020    Patient expressed understanding of goal: Yes    Action steps to achieve this goal:  1. I will have iron infusions as needed  2. I will eat a healthy diet  3. I will get adequate sleep  4. I will stay home, social distance, wear a mask and perform good handwashing to decrease exposure to covid-19 pandemic         Intervention/Education provided during outreach: As above     Outreach Frequency: monthly    Plan:   Patient will return call as needed.    Care Coordinator will follow up in one month.    Martha Lang RN, [facility] - Primary Care Clinic RN Coordinator  [facility]   12/16/2020    1:21 PM  [phone]  "

## 2020-12-16 NOTE — LETTER
[facility]  Complex Care Plan  About Me:    Patient Name:  Chi Newman    YOB: 1945  Age:         75 year old   [facility] MRN:    [id number] Telephone Information:  Home Phone [phone]   Mobile [phone]       Address:  [address] [place] [address] Email address:  [email or web address]      Emergency Contact(s)    Name Relationship Lgl Grd Work Phone Home Phone Mobile Phone   1. TIFFANIE AVRMA Daughter No  [phone]           Primary language:  English     needed? No   [facility] [employer]:  [phone] op. 1  Other communication barriers:  None  Preferred Method of Communication:  Mail  Current living arrangement:  Own home  Mobility Status/ Medical Equipment:  None    Health Maintenance  Health Maintenance Reviewed:      My Access Plan  Medical Emergency 911   Primary Clinic Line [facility] [phone]   24 Hour Appointment Line [phone] or  [phone]) (toll-free)   24 Hour Nurse Line [phone] (toll-free)   Preferred Urgent Care     Preferred Hospital     Preferred Pharmacy [facility]  [address] [facility]     Behavioral Health Crisis Line The National Suicide Prevention Lifeline at 1-718.501.2829 or 911             My Care Team Members  Patient Care Team       Relationship Specialty Notifications Start End    Isaac Knutson MD PCP - General Internal Medicine  7/2/13     Phone: [phone] Fax: [phone] [address] [facility] [place] [address]    Isaac Knutson MD Assigned PCP   10/27/13     Phone: [phone] Fax: [phone] [address] [facility] [place] [address]    Fawad Robles MD  Ophthalmology  1/5/16     Phone: [phone] Fax: [phone]         [address] [place] [address]    Martha Lang, PRINCESS Lead Care Coordinator Primary Care - [facility] Admissions 4/27/20     Phone: [phone]   "       Connie Martinez MD Assigned Surgical Provider   10/23/20     Phone: [phone] Fax: [phone] [address] [facility] [address]            My Care Plans  Self Management and Treatment Plan  Goals and (Comments)  Goals        General    Medical (pt-stated)     Notes - Note edited  9/24/2020 12:37 PM by Martha Lang RN    Goal Statement: I want to feel \"like my old self again\"     Date Goal set: 4/27/2020    Barriers: Iron deficiency    Strengths: Motivated    Date to Achieve By: February 2020    Patient expressed understanding of goal: Yes    Action steps to achieve this goal:  1. I will have iron infusions as needed  2. I will eat a healthy diet  3. I will get adequate sleep  4. I will stay home, social distance, wear a mask and perform good handwashing to decrease exposure to covid-19 pandemic              Action Plans on File:                       Advance Care Plans/Directives Type:        My Medical and Care Information  Problem List   Patient Active Problem List   Diagnosis     Health Care Home     Hyperlipidemia with target LDL less than 130     Diverticulosis of colon     Elevated liver enzymes     Hypovitaminosis D     Environmental allergies     Seasonal allergic rhinitis     Pneumonia     BPH (benign prostatic hyperplasia)     Diverticulitis of colon     Chronic nonalcoholic liver disease     Microscopic hematuria     Umbilical hernia     Elevated glucose     Elevated prostate specific antigen (PSA)     Drusen of macula of both eyes     Posterior vitreous detachment of right eye     Nonexudative senile macular degeneration of retina     Senile nuclear sclerosis, bilateral     Slow transit constipation     Vitreous syneresis of left eye     PVD (posterior vitreous detachment), both eyes     History of diverticulitis of colon     History of partial colectomy     Meibomian gland dysfunction (MGD) of both eyes     Dry eye of right side     Gastric erosion, unspecified " chronicity     Iron deficiency anemia due to chronic blood loss     Iron deficiency anemia, unspecified      Current Medications and Allergies:  See printed Medication Report.    Care Coordination Start Date: No linked episodes   Frequency of Care Coordination:     Form Last Updated: 12/16/2020

## 2021-01-07 ENCOUNTER — PATIENT OUTREACH (OUTPATIENT)
Dept: NURSING | Facility: CLINIC | Age: 76
End: 2021-01-07
Payer: COMMERCIAL

## 2021-01-07 ASSESSMENT — ACTIVITIES OF DAILY LIVING (ADL): DEPENDENT_IADLS:: INDEPENDENT

## 2021-01-07 NOTE — PROGRESS NOTES
"Clinic Care Coordination Contact    Follow Up Progress Note      Assessment: Patient calling clinic care coordinator.     Patient states he has some issues with constipation so started taking senokot at bedtime and this resolved the issue.      He stopped the senokot but started taking it again a few days ago as he was feeling on \"the edge of constipation.\"  He states he has had some low abdominal pain. This am he had a loose BM, but had not had any more.      We discussed his diet in detail. Advised bland diet without fats for the next day or so. Explained the abdominal pain he is describing appears to be gas pains.  He states he has been drinking a lot of cranberry grape juice. Advised he back off a bit.     Patient is very anxious today, talking without breaks. He states he has some routine doctor appointments next week and he has been worrying about these. He states he continues to be very anxious about covid-19. He states his sister in [place] got it along with all of her family. She has recovered. Patient wonders when he will be able to get the vaccine.      Goals addressed this encounter: Declining today  Goals Addressed                 This Visit's Progress      Medical (pt-stated)   30%     Goal Statement: I want to feel \"like my old self again\"     Date Goal set: 4/27/2020    Barriers: Iron deficiency    Strengths: Motivated    Date to Achieve By: February 2020    Patient expressed understanding of goal: Yes    Action steps to achieve this goal:  1. I will have iron infusions as needed  2. I will eat a healthy diet  3. I will get adequate sleep  4. I will stay home, social distance, wear a mask and perform good handwashing to decrease exposure to covid-19 pandemic         Intervention/Education provided during outreach: Listening, reassurance     Outreach Frequency: monthly    Plan:   Patient will follow up with appointments as scheduled.  Patient will follow a bland diet for 24 hours.      Care " Coordinator will follow up in one month.    Martha Lang RN, [facility] - Primary Care Clinic RN Coordinator  [facility]   1/7/2021    1:43 PM  [phone]

## 2021-01-08 ENCOUNTER — MYC MEDICAL ADVICE (OUTPATIENT)
Dept: INTERNAL MEDICINE | Facility: CLINIC | Age: 76
End: 2021-01-08

## 2021-01-08 NOTE — TELEPHONE ENCOUNTER
Called patient to schedule appointment nothing else needed.       Cassandra CASTILLO CMA ([facility])

## 2021-01-12 ENCOUNTER — PATIENT OUTREACH (OUTPATIENT)
Dept: NURSING | Facility: CLINIC | Age: 76
End: 2021-01-12
Payer: COMMERCIAL

## 2021-01-12 NOTE — PROGRESS NOTES
"Clinic Care Coordination Contact    Follow Up Progress Note      Assessment: Patient calling clinic care coordinator.    Patient states \"I don't know what is going on. I don't know if I have covid-19 or what is wrong with my GI tract\"  When asked what his symptoms are patient denies fever, denies cough, denies shortness of breath. Patient states he is either constipated or had diarrhea.  When asked very specific questions regarding bowel function;  Patient consider constipation to be if he does not have two BM's per day. He thinks diarrhea is soft stools or \"shapes that look funny\"  We discussed in length.  Patient talks in detail about his bowel issues since 2017. He ramble from one episode to the next.     Advised that patient has PCP appointment in less than two days and can discuss his concerns.   Also recommended patient follow up with his GI physician if he has ongoing \"issues\"    Lots of moaning and groaning as he is speaking. Patient keeps talking over and input from care coordinator.    We discussed diet at length. Patient states he was trying not to eat so see if that made any difference. Advised against not eating as that is why he may have a headache and is feeling weak and tired.  We again discussed diet in detail. It is important to eat both liquid and solid foods. Advised he use some yogurt.     Patient appears very anxious. He talks about watching a lot of the TV news and reading a lot. Advised backing off some of the news as it is upsetting and creates anxiety with the unrest.     Patient is anxious about covid and the vaccine. He worries about people refusing to get it and then it might go to waste and the people who need it won't be able to get it.  Lots of projection of doom.      Goals addressed this encounter:   Goals Addressed                 This Visit's Progress      Medical (pt-stated)   20%     Goal Statement: I want to feel \"like my old self again\"     Date Goal set: " 4/27/2020    Barriers: Iron deficiency    Strengths: Motivated    Date to Achieve By: February 2020    Patient expressed understanding of goal: Yes    Action steps to achieve this goal:  1. I will have iron infusions as needed  2. I will eat a healthy diet  3. I will get adequate sleep  4. I will stay home, social distance, wear a mask and perform good handwashing to decrease exposure to covid-19 pandemic         Intervention/Education provided during outreach: Listening, reassurance     Outreach Frequency: monthly    Plan:   See PCP as scheduled    Care Coordinator will follow up in one month    Martha Lang RN, [facility] - Primary Care Clinic RN Coordinator  [facility]   1/12/2021     [phone]

## 2021-01-13 ASSESSMENT — ENCOUNTER SYMPTOMS
JOINT SWELLING: 0
HEADACHES: 1
DIARRHEA: 1
COUGH: 0
CHILLS: 0
FEVER: 0
EYE PAIN: 0
DYSURIA: 0
HEMATURIA: 0
ABDOMINAL PAIN: 1
HEMATOCHEZIA: 0
SHORTNESS OF BREATH: 0
WEAKNESS: 0
PALPITATIONS: 0
FREQUENCY: 0
PARESTHESIAS: 0
HEARTBURN: 0
NERVOUS/ANXIOUS: 1
DIZZINESS: 1
MYALGIAS: 1
CONSTIPATION: 1
SORE THROAT: 0
ARTHRALGIAS: 1
NAUSEA: 0

## 2021-01-13 ASSESSMENT — PATIENT HEALTH QUESTIONNAIRE - PHQ9
10. IF YOU CHECKED OFF ANY PROBLEMS, HOW DIFFICULT HAVE THESE PROBLEMS MADE IT FOR YOU TO DO YOUR WORK, TAKE CARE OF THINGS AT HOME, OR GET ALONG WITH OTHER PEOPLE: SOMEWHAT DIFFICULT
SUM OF ALL RESPONSES TO PHQ QUESTIONS 1-9: 3
SUM OF ALL RESPONSES TO PHQ QUESTIONS 1-9: 3

## 2021-01-13 ASSESSMENT — ACTIVITIES OF DAILY LIVING (ADL): CURRENT_FUNCTION: NO ASSISTANCE NEEDED

## 2021-01-14 ENCOUNTER — OFFICE VISIT (OUTPATIENT)
Dept: INTERNAL MEDICINE | Facility: CLINIC | Age: 76
End: 2021-01-14
Payer: COMMERCIAL

## 2021-01-14 VITALS
SYSTOLIC BLOOD PRESSURE: 158 MMHG | RESPIRATION RATE: 14 BRPM | OXYGEN SATURATION: 98 % | DIASTOLIC BLOOD PRESSURE: 82 MMHG | HEART RATE: 69 BPM | HEIGHT: 65 IN | BODY MASS INDEX: 27.66 KG/M2 | TEMPERATURE: 98.7 F | WEIGHT: 166 LBS

## 2021-01-14 DIAGNOSIS — E78.5 HYPERLIPIDEMIA WITH TARGET LDL LESS THAN 130: ICD-10-CM

## 2021-01-14 DIAGNOSIS — N40.0 BENIGN PROSTATIC HYPERPLASIA, UNSPECIFIED WHETHER LOWER URINARY TRACT SYMPTOMS PRESENT: ICD-10-CM

## 2021-01-14 DIAGNOSIS — Z00.00 ENCOUNTER FOR SUBSEQUENT ANNUAL WELLNESS VISIT (AWV) IN MEDICARE PATIENT: Primary | ICD-10-CM

## 2021-01-14 DIAGNOSIS — Z23 NEED FOR PROPHYLACTIC VACCINATION AND INOCULATION AGAINST INFLUENZA: ICD-10-CM

## 2021-01-14 DIAGNOSIS — I10 ESSENTIAL HYPERTENSION WITH GOAL BLOOD PRESSURE LESS THAN 140/90: ICD-10-CM

## 2021-01-14 DIAGNOSIS — D50.0 IRON DEFICIENCY ANEMIA DUE TO CHRONIC BLOOD LOSS: ICD-10-CM

## 2021-01-14 DIAGNOSIS — Z71.89 ADVANCED DIRECTIVES, COUNSELING/DISCUSSION: ICD-10-CM

## 2021-01-14 DIAGNOSIS — R73.09 ELEVATED GLUCOSE: ICD-10-CM

## 2021-01-14 DIAGNOSIS — Z12.5 SCREENING FOR PROSTATE CANCER: ICD-10-CM

## 2021-01-14 LAB
ALBUMIN SERPL-MCNC: 4.2 G/DL (ref 3.4–5)
ALP SERPL-CCNC: 108 U/L (ref 40–150)
ALT SERPL W P-5'-P-CCNC: 39 U/L (ref 0–70)
ANION GAP SERPL CALCULATED.3IONS-SCNC: 2 MMOL/L (ref 3–14)
AST SERPL W P-5'-P-CCNC: 26 U/L (ref 0–45)
BILIRUB SERPL-MCNC: 0.4 MG/DL (ref 0.2–1.3)
BUN SERPL-MCNC: 11 MG/DL (ref 7–30)
CALCIUM SERPL-MCNC: 9.1 MG/DL (ref 8.5–10.1)
CHLORIDE SERPL-SCNC: 107 MMOL/L (ref 94–109)
CHOLEST SERPL-MCNC: 201 MG/DL
CO2 SERPL-SCNC: 29 MMOL/L (ref 20–32)
CREAT SERPL-MCNC: 0.88 MG/DL (ref 0.66–1.25)
GFR SERPL CREATININE-BSD FRML MDRD: 85 ML/MIN/{1.73_M2}
GLUCOSE SERPL-MCNC: 86 MG/DL (ref 70–99)
HBA1C MFR BLD: 5.5 % (ref 0–5.6)
HDLC SERPL-MCNC: 43 MG/DL
HGB BLD-MCNC: 14.2 G/DL (ref 13.3–17.7)
LDLC SERPL CALC-MCNC: 124 MG/DL
NONHDLC SERPL-MCNC: 158 MG/DL
POTASSIUM SERPL-SCNC: 4.2 MMOL/L (ref 3.4–5.3)
PROT SERPL-MCNC: 7.8 G/DL (ref 6.8–8.8)
PSA SERPL-ACNC: 7.67 UG/L (ref 0–4)
SODIUM SERPL-SCNC: 138 MMOL/L (ref 133–144)
TRIGL SERPL-MCNC: 170 MG/DL

## 2021-01-14 PROCEDURE — 90662 IIV NO PRSV INCREASED AG IM: CPT | Performed by: INTERNAL MEDICINE

## 2021-01-14 PROCEDURE — 99397 PER PM REEVAL EST PAT 65+ YR: CPT | Mod: 25 | Performed by: INTERNAL MEDICINE

## 2021-01-14 PROCEDURE — 80061 LIPID PANEL: CPT | Performed by: INTERNAL MEDICINE

## 2021-01-14 PROCEDURE — 99213 OFFICE O/P EST LOW 20 MIN: CPT | Mod: 25 | Performed by: INTERNAL MEDICINE

## 2021-01-14 PROCEDURE — G0008 ADMIN INFLUENZA VIRUS VAC: HCPCS | Performed by: INTERNAL MEDICINE

## 2021-01-14 PROCEDURE — 36415 COLL VENOUS BLD VENIPUNCTURE: CPT | Performed by: INTERNAL MEDICINE

## 2021-01-14 PROCEDURE — 83036 HEMOGLOBIN GLYCOSYLATED A1C: CPT | Performed by: INTERNAL MEDICINE

## 2021-01-14 PROCEDURE — 80053 COMPREHEN METABOLIC PANEL: CPT | Performed by: INTERNAL MEDICINE

## 2021-01-14 PROCEDURE — 85018 HEMOGLOBIN: CPT | Performed by: INTERNAL MEDICINE

## 2021-01-14 PROCEDURE — G0103 PSA SCREENING: HCPCS | Performed by: INTERNAL MEDICINE

## 2021-01-14 RX ORDER — HYDROCHLOROTHIAZIDE 12.5 MG/1
12.5 CAPSULE ORAL DAILY
Qty: 30 CAPSULE | Refills: 5 | Status: SHIPPED | OUTPATIENT
Start: 2021-01-14 | End: 2022-04-08

## 2021-01-14 ASSESSMENT — ENCOUNTER SYMPTOMS
JOINT SWELLING: 0
DYSURIA: 0
WEAKNESS: 0
SHORTNESS OF BREATH: 0
CHILLS: 0
NAUSEA: 0
NERVOUS/ANXIOUS: 1
SORE THROAT: 0
HEADACHES: 1
FEVER: 0
COUGH: 0
ABDOMINAL PAIN: 1
CONSTIPATION: 1
DIZZINESS: 1
ARTHRALGIAS: 1
HEARTBURN: 0
EYE PAIN: 0
MYALGIAS: 1
PALPITATIONS: 0
PARESTHESIAS: 0
DIARRHEA: 1
FREQUENCY: 0
HEMATOCHEZIA: 0
HEMATURIA: 0

## 2021-01-14 ASSESSMENT — ACTIVITIES OF DAILY LIVING (ADL): CURRENT_FUNCTION: NO ASSISTANCE NEEDED

## 2021-01-14 ASSESSMENT — MIFFLIN-ST. JEOR: SCORE: 1415.88

## 2021-01-14 ASSESSMENT — PATIENT HEALTH QUESTIONNAIRE - PHQ9: SUM OF ALL RESPONSES TO PHQ QUESTIONS 1-9: 3

## 2021-01-14 NOTE — LETTER
January 15, 2021    Chi Newman  [address] [address]  [place] [address]      Dear ,    We are writing to inform you of your test results.    All of these tests are within acceptable limits , things look good !       Resulted Orders   Lipid panel reflex to direct LDL Fasting   Result Value Ref Range    Cholesterol 201 (H) <200 mg/dL      Comment:      Desirable:       <200 mg/dl    Triglycerides 170 (H) <150 mg/dL      Comment:      Borderline high:  150-199 mg/dl  High:             200-499 mg/dl  Very high:       >499 mg/dl      HDL Cholesterol 43 >39 mg/dL    LDL Cholesterol Calculated 124 (H) <100 mg/dL      Comment:      Above desirable:  100-129 mg/dl  Borderline High:  130-159 mg/dL  High:             160-189 mg/dL  Very high:       >189 mg/dl      Non HDL Cholesterol 158 (H) <130 mg/dL      Comment:      Above Desirable:  130-159 mg/dl  Borderline high:  160-189 mg/dl  High:             190-219 mg/dl  Very high:       >219 mg/dl     PSA, screen   Result Value Ref Range    PSA 7.67 (H) 0 - 4 ug/L      Comment:      Assay Method:  Chemiluminescence using Siemens Vista analyzer   Comprehensive metabolic panel   Result Value Ref Range    Sodium 138 133 - 144 mmol/L    Potassium 4.2 3.4 - 5.3 mmol/L    Chloride 107 94 - 109 mmol/L    Carbon Dioxide 29 20 - 32 mmol/L    Anion Gap 2 (L) 3 - 14 mmol/L    Glucose 86 70 - 99 mg/dL    Urea Nitrogen 11 7 - 30 mg/dL    Creatinine 0.88 0.66 - 1.25 mg/dL    GFR Estimate 85 >60 mL/min/[1.73_m2]      Comment:      Non  GFR Calc  Starting 12/18/2018, serum creatinine based estimated GFR (eGFR) will be   calculated using the Chronic Kidney Disease Epidemiology Collaboration   (CKD-EPI) equation.      GFR Estimate If Black >90 >60 mL/min/[1.73_m2]      Comment:       GFR Calc  Starting 12/18/2018, serum creatinine based estimated GFR (eGFR) will be   calculated using the Chronic Kidney Disease Epidemiology Collaboration    (CKD-EPI) equation.      Calcium 9.1 8.5 - 10.1 mg/dL    Bilirubin Total 0.4 0.2 - 1.3 mg/dL    Albumin 4.2 3.4 - 5.0 g/dL    Protein Total 7.8 6.8 - 8.8 g/dL    Alkaline Phosphatase 108 40 - 150 U/L    ALT 39 0 - 70 U/L    AST 26 0 - 45 U/L   Hemoglobin   Result Value Ref Range    Hemoglobin 14.2 13.3 - 17.7 g/dL   Hemoglobin A1c   Result Value Ref Range    Hemoglobin A1C 5.5 0 - 5.6 %      Comment:      Normal <5.7% Prediabetes 5.7-6.4%  Diabetes 6.5% or higher - adopted from ADA   consensus guidelines.     If you have any questions or concerns, please call the clinic at the number listed above.       Sincerely,      Isaac Knutson MD/dillon

## 2021-01-14 NOTE — PROGRESS NOTES
"SUBJECTIVE:   Chi Newman is a 76 year old male who presents for Preventive Visit.    Encounter Diagnoses   Name Primary?     Encounter for subsequent annual wellness visit (AWV) in Medicare patient Yes     Advanced directives, counseling/discussion      Essential hypertension with goal blood pressure less than 140/90      Hyperlipidemia with target LDL less than 130      Benign prostatic hyperplasia, unspecified whether lower urinary tract symptoms present      Elevated glucose      Iron deficiency anemia due to chronic blood loss      Screening for prostate cancer         Patient has been advised of split billing requirements and indicates understanding: Yes   Are you in the first 12 months of your Medicare coverage?  No    Healthy Habits:     In general, how would you rate your overall health?  Fair    Frequency of exercise:  2-3 days/week    Duration of exercise:  15-30 minutes    Do you usually eat at least 4 servings of fruit and vegetables a day, include whole grains    & fiber and avoid regularly eating high fat or \"junk\" foods?  No    Taking medications regularly:  Yes    Medication side effects:  Muscle aches and Lightheadedness    Ability to successfully perform activities of daily living:  No assistance needed    Home Safety:  No safety concerns identified    Hearing Impairment:  Difficulty following a conversation in a noisy restaurant or crowded room, need to ask people to speak up or repeat themselves and difficulty understanding soft or whispered speech    In the past 6 months, have you been bothered by leaking of urine?  No    In general, how would you rate your overall mental or emotional health?  Good      PHQ-2 Total Score: 3    Additional concerns today:  Yes    Do you feel safe in your environment? Yes    BP Readings from Last 6 Encounters:   01/14/21 (!) 158/82   10/16/20 (!) 143/76   10/09/20 138/74   05/05/20 (!) 146/64   04/28/20 (!) 145/70   02/04/20 136/68     Have you ever done " Advance Care Planning? (For example, a Health Directive, POLST, or a discussion with a medical provider or your loved ones about your wishes): Yes, patient states has an Advance Care Planning document and will bring a copy to the clinic.    Fall risk  Fallen 2 or more times in the past year?: No  Any fall with injury in the past year?: No    Cognitive Screening   1) Repeat 3 items (Leader, Season, Table)    2) Clock draw: NORMAL  3) 3 item recall: Recalls 3 objects  Results: 3 items recalled: COGNITIVE IMPAIRMENT LESS LIKELY    Mini-CogTM Copyright S Aaliyah. Licensed by the author for use in [facility]; reprinted with permission (sosherin@[facility]). All rights reserved.      Do you have sleep apnea, excessive snoring or daytime drowsiness?: no    Reviewed and updated as needed this visit by clinical staff  Tobacco  Allergies  Meds   Med Hx  Surg Hx  Fam Hx  Soc Hx        Reviewed and updated as needed this visit by Provider                Social History     Tobacco Use     Smoking status: Never Smoker     Smokeless tobacco: Never Used   Substance Use Topics     Alcohol use: Not Currently     Alcohol/week: 0.0 standard drinks     Comment: Occasional glass of wine and beer.     If you drink alcohol do you typically have >3 drinks per day or >7 drinks per week? No    Alcohol Use 1/13/2021   Prescreen: >3 drinks/day or >7 drinks/week? No   Prescreen: >3 drinks/day or >7 drinks/week? -   No flowsheet data found.      Current providers sharing in care for this patient include:   Patient Care Team:  Isaac Knutson MD as PCP - General (Internal Medicine)  Isaac Knutson MD as Assigned PCP  Fawad Robles MD (Ophthalmology)  Martha Lang RN as Lead Care Coordinator (Primary Care - CC)  Connie Martinez MD as Assigned Surgical Provider    The following health maintenance items are reviewed in Epic and correct as of today:  Health Maintenance   Topic Date Due     AORTIC ANEURYSM SCREENING  (SYSTEM ASSIGNED)  01/06/2012     INFLUENZA VACCINE (1) 09/01/2020     HEPATITIS B IMMUNIZATION (2 of 3 - Risk 3-dose series) 11/20/2020     LIPID  01/13/2021     MEDICARE ANNUAL WELLNESS VISIT  01/13/2021     FALL RISK ASSESSMENT  03/11/2021     EYE EXAM  12/11/2021     ADVANCE CARE PLANNING  01/13/2025     COLORECTAL CANCER SCREENING  01/23/2028     DTAP/TDAP/TD IMMUNIZATION (3 - Td) 11/29/2028     HEPATITIS C SCREENING  Completed     PHQ-2  Completed     Pneumococcal Vaccine: 65+ Years  Completed     ZOSTER IMMUNIZATION  Completed     Pneumococcal Vaccine: Pediatrics (0 to 5 Years) and At-Risk Patients (6 to 64 Years)  Aged Out     IPV IMMUNIZATION  Aged Out     MENINGITIS IMMUNIZATION  Aged Out     Patient feels more generally arthritic, achy type pains as he's getting older.    Lab work is in process  Labs reviewed in EPIC  BP Readings from Last 3 Encounters:   01/14/21 (!) 158/82   10/16/20 (!) 143/76   10/09/20 138/74    Wt Readings from Last 3 Encounters:   01/14/21 75.3 kg (166 lb)   10/16/20 78 kg (172 lb)   10/09/20 79.1 kg (174 lb 6.4 oz)                  Patient Active Problem List   Diagnosis     Health Care Home     Hyperlipidemia with target LDL less than 130     Diverticulosis of colon     Elevated liver enzymes     Hypovitaminosis D     Environmental allergies     Seasonal allergic rhinitis     Pneumonia     BPH (benign prostatic hyperplasia)     Diverticulitis of colon     Chronic nonalcoholic liver disease     Microscopic hematuria     Umbilical hernia     Elevated glucose     Elevated prostate specific antigen (PSA)     [place]us[place] of macula of both eyes     Posterior vitreous detachment of right eye     Nonexudative senile macular degeneration of retina     Senile nuclear sclerosis, bilateral     Slow transit constipation     Vitreous syneresis of left eye     PVD (posterior vitreous detachment), both eyes     History of diverticulitis of colon     History of partial colectomy     Meibomian gland  dysfunction (MGD) of both eyes     Dry eye of right side     Gastric erosion, unspecified chronicity     Iron deficiency anemia due to chronic blood loss     Iron deficiency anemia, unspecified     Past Surgical History:   Procedure Laterality Date     ABDOMEN SURGERY      Diverticulitus     ARTHROSCOPY KNEE  89    right knee, LMT     ARTHROSCOPY KNEE  1988    left knee, MMT     BIOPSY      Colonoscopy / Endoscopy     COLONOSCOPY  10/7/2002    diverticulosis, due again in      COLONOSCOPY  2013     SMALL BOWEL RESECTION  3/19/2014    small bowel resection, lysis of adhesions and pelvic drain placement     TONSILLECTOMY & ADENOIDECTOMY  age of 15       Social History     Tobacco Use     Smoking status: Never Smoker     Smokeless tobacco: Never Used   Substance Use Topics     Alcohol use: Not Currently     Alcohol/week: 0.0 standard drinks     Comment: Occasional glass of wine and beer.     Family History   Problem Relation Age of Onset     Cancer Mother         Colon Cancer     Colon Cancer Mother          of Colon/Intestinal cancer     Diabetes Brother         My brother  from complications of diabetes     Glaucoma No family hx of      Macular Degeneration No family hx of          Current Outpatient Medications   Medication Sig Dispense Refill     acetaminophen (TYLENOL) 500 MG tablet Take 500-1,000 mg by mouth every 6 hours as needed       cholecalciferol 25 MCG (1000 UT) TABS Take 3,000 Units by mouth daily 300 tablet 2     fexofenadine (ALLEGRA) 180 MG tablet Take 1 tablet (180 mg) by mouth daily Take 180 mg by mouth once daily. Indications: SEASONAL ALLERGIC RHINITIS 90 tablet 3     fluticasone (FLONASE) 50 MCG/ACT nasal spray USE ONE TO TWO SPRAYS IN EACH NOSTRIL EVERY DAY 16 g 3     hypromellose (GENTEAL) ophthalmic gel 0.3% Place 1 drop into both eyes every hour as needed for dry eyes       pantoprazole (PROTONIX) 40 MG EC tablet TAKE ONE TABLET BY MOUTH ONCE DAILY 90 tablet 1      Propylene Glycol (SYSTANE BALANCE OP) Apply 1 drop to eye 4 times daily       STATIN NOT PRESCRIBED (INTENTIONAL) Please choose reason not prescribed, below       triamcinolone (KENALOG) 0.1 % external cream Apply topically 2 times daily 453.6 g 0     Allergies   Allergen Reactions     Atorvastatin      Ezetimibe-Simvastatin Muscle Pain (Myalgia)     Vytorin      Vancomycin Rash     Rash/hives     Recent Labs   Lab Test 01/13/20  1418 11/29/18  1501 10/31/17  1221 10/18/16  1357 10/15/15  1412 10/15/15  1412 11/05/13  1453 11/05/13  1453   A1C 5.4 5.7* 5.4 5.5  --  5.6   < >  --    LDL 82 Cannot estimate LDL when triglyceride exceeds 400 mg/dL  126* 135*  --    < > 150*   < > 137*   HDL 39* 32* 38*  --    < >  --    < > 28*   TRIG 189* 481* 246*  --    < >  --    < >  --    ALT  --   --  71* 34  --  46   < > 65   CR 0.87 0.82 0.86 0.99  --  0.91   < > 0.98   GFRESTIMATED 86 >90 88 75  --  83   < > 77   GFRESTBLACK >90 >90 >90 >90  African American GFR Calc    --  >90   GFR Calc     < > >90   POTASSIUM 4.3 3.8 4.2 4.3  --  3.9   < > 3.8   TSH  --   --   --   --   --   --   --  1.96    < > = values in this interval not displayed.        Review of Systems   Constitutional: Negative for chills and fever.   HENT: Positive for hearing loss. Negative for congestion, ear pain and sore throat.    Eyes: Negative for pain and visual disturbance.   Respiratory: Negative for cough and shortness of breath.    Cardiovascular: Negative for chest pain, palpitations and peripheral edema.   Gastrointestinal: Positive for abdominal pain, constipation and diarrhea. Negative for heartburn, hematochezia and nausea.   Genitourinary: Negative for discharge, dysuria, frequency, genital sores, hematuria, impotence and urgency.   Musculoskeletal: Positive for arthralgias and myalgias. Negative for joint swelling.   Skin: Negative for rash.   Neurological: Positive for dizziness and headaches. Negative for weakness and  "paresthesias.   Psychiatric/Behavioral: Negative for mood changes. The patient is nervous/anxious.      Constitutional, HEENT, cardiovascular, pulmonary, gi and gu systems are negative, except as otherwise noted.    OBJECTIVE:   BP (!) 158/82   Pulse 69   Temp 98.7  F (37.1  C) (Oral)   Resp 14   Ht 1.645 m (5' 4.75\")   Wt 75.3 kg (166 lb)   SpO2 98%   BMI 27.84 kg/m   Estimated body mass index is 27.84 kg/m  as calculated from the following:    Height as of this encounter: 1.645 m (5' 4.75\").    Weight as of this encounter: 75.3 kg (166 lb).  Physical Exam  GENERAL: healthy, alert and no distress  EYES: Eyes grossly normal to inspection, PERRL and conjunctivae and sclerae normal  HENT: ear canals and TM's normal, nose and mouth without ulcers or lesions  NECK: no adenopathy, no asymmetry, masses, or scars and thyroid normal to palpation  RESP: lungs clear to auscultation - no rales, rhonchi or wheezes  CV: regular rate and rhythm, normal S1 S2, no S3 or S4, no murmur, click or rub, no peripheral edema and peripheral pulses strong  ABDOMEN: soft, nontender, no hepatosplenomegaly, no masses and bowel sounds normal  MS: no gross musculoskeletal defects noted, no edema  SKIN: no suspicious lesions or rashes  NEURO: Normal strength and tone, mentation intact and speech normal  PSYCH: mentation appears normal, affect normal/bright    Diagnostic Test Results:  Labs reviewed in Epic  Orders Placed This Encounter   Procedures     Lipid panel reflex to direct LDL Fasting     PSA, screen     Comprehensive metabolic panel     Hemoglobin     Hemoglobin A1c         ASSESSMENT / PLAN:   (Z00.00) Encounter for subsequent annual wellness visit (AWV) in Medicare patient  (primary encounter diagnosis)  Comment: routine screening issues   Plan: see orders section of this encounter     (Z71.89) Advanced directives, counseling/discussion  Comment: discussed   Plan:     (I10) Essential hypertension with goal blood pressure less " "than 140/90  Comment: this is a new diagnosis today . We reviewed the diagnosis and started medication . Follow up medical assistant blood pressure recheck  In 2-4 weeks recommended   Plan: Comprehensive metabolic panel,         hydrochlorothiazide (MICROZIDE) 12.5 MG capsule            (E78.5) Hyperlipidemia with target LDL less than 130  Comment: routine screening   Plan: Lipid panel reflex to direct LDL Fasting            (N40.0) Benign prostatic hyperplasia, unspecified whether lower urinary tract symptoms present  Comment: symptoms reviewed   Plan:     (R73.09) Elevated glucose  Comment: doubt clinical significance but warrants a1c    Plan: Hemoglobin A1c            (D50.0) Iron deficiency anemia due to chronic blood loss  Comment: well documented history   Plan: Hemoglobin        Ongoing monitoring      (Z12.5) Screening for prostate cancer  Comment: pros and cons reviewed   Plan: PSA, screen        Follow up as indicated on results       Patient has been advised of split billing requirements and indicates understanding: Yes  COUNSELING:  Reviewed preventive health counseling, as reflected in patient instructions    Estimated body mass index is 27.84 kg/m  as calculated from the following:    Height as of this encounter: 1.645 m (5' 4.75\").    Weight as of this encounter: 75.3 kg (166 lb).    Weight management plan: Discussed healthy diet and exercise guidelines    He reports that he has never smoked. He has never used smokeless tobacco.      Appropriate preventive services were discussed with this patient, including applicable screening as appropriate for cardiovascular disease, diabetes, osteopenia/osteoporosis, and glaucoma.  As appropriate for age/gender, discussed screening for colorectal cancer, prostate cancer, breast cancer, and cervical cancer. Checklist reviewing preventive services available has been given to the patient.    Reviewed patients plan of care and provided an AVS. The Basic Care Plan " (routine screening as documented in Health Maintenance) for Chi meets the Care Plan requirement. This Care Plan has been established and reviewed with the Patient.    Counseling Resources:  ATP IV Guidelines  Pooled Cohorts Equation Calculator  Breast Cancer Risk Calculator  Breast Cancer: Medication to Reduce Risk  FRAX Risk Assessment  ICSI Preventive Guidelines  Dietary Guidelines for Americans, 2010  [employer] MyPlate  ASA Prophylaxis  Lung CA Screening    Isaac Knutson MD  [facility]    Identified Health Risks:  Answers for HPI/ROS submitted by the patient on 1/13/2021   Annual Exam:  If you checked off any problems, how difficult have these problems made it for you to do your work, take care of things at home, or get along with other people?: Somewhat difficult  PHQ9 TOTAL SCORE: 3

## 2021-01-26 ENCOUNTER — PATIENT OUTREACH (OUTPATIENT)
Dept: NURSING | Facility: CLINIC | Age: 76
End: 2021-01-26
Payer: COMMERCIAL

## 2021-01-26 ENCOUNTER — MYC MEDICAL ADVICE (OUTPATIENT)
Dept: INTERNAL MEDICINE | Facility: CLINIC | Age: 76
End: 2021-01-26

## 2021-01-26 NOTE — PROGRESS NOTES
"Clinic Care Coordination Contact    Follow Up Progress Note      Assessment: Patient calling RN clinic care coordinator.     Patient states he will be having his blood pressure checked this week in the clinic. It was a little high at his wellness visit and PCP wants it rechecked. Patient states he has never had high blood pressure previously so he is not terribly worried,    Patient states he had a flu shot at his visit and is finally getting over the side effects of that. He states he has been feeling a little weak, had a headache and was achy but this is now improving.      Patient wonders what is \"going on with the covid vaccines.\" He feels there were rules set out for who gets the injections when and now they are all changing. Patient is very anxious over this. He goes on and on about people not following the rules. He included people who do not follow the precautions for lina covid as well. He is very concerned about the new strains of covid being found and worries that he will catch this as it is more contagious.   Patient does not stop talking for writer to answer or make a comment.     He states he just raised is blood pressure by several points at the end of his conversation.        patient is very tied up with the news on TV, radio or newspapers. He follow online as well.      He states he can't understand why we can't have more vaccine and get this under control.      Goals addressed this encounter: slow improvement  Goals Addressed                 This Visit's Progress      Medical (pt-stated)   30%     Goal Statement: I want to feel \"like my old self again\"     Date Goal set: 4/27/2020    Barriers: Iron deficiency    Strengths: Motivated    Date to Achieve By: February 2020    Patient expressed understanding of goal: Yes    Action steps to achieve this goal:  1. I will have iron infusions as needed  2. I will eat a healthy diet  3. I will get adequate sleep  4. I will stay home, social distance, " wear a mask and perform good handwashing to decrease exposure to covid-19 pandemic         Intervention/Education provided during outreach: Listening, reassurance     Outreach Frequency: monthly    Plan:   Patient will relax and wait until vaccine is available. He states he got an email from [place] that tells him he will be notified when there is vaccine available for him.     Care Coordinator will follow up in one month    Martha Lang RN, [facility] - Primary Care Clinic RN Coordinator  [facility]   1/26/2021    10:58 AM  [phone]

## 2021-01-28 ENCOUNTER — ALLIED HEALTH/NURSE VISIT (OUTPATIENT)
Dept: NURSING | Facility: CLINIC | Age: 76
End: 2021-01-28
Payer: COMMERCIAL

## 2021-01-28 VITALS — DIASTOLIC BLOOD PRESSURE: 66 MMHG | HEART RATE: 64 BPM | SYSTOLIC BLOOD PRESSURE: 142 MMHG | OXYGEN SATURATION: 100 %

## 2021-01-28 DIAGNOSIS — I10 ESSENTIAL HYPERTENSION: Primary | ICD-10-CM

## 2021-01-28 NOTE — PROGRESS NOTES
Chi Newman is a 76 year old patient who comes in today for a Blood Pressure check.  Initial BP:  BP (!) 142/66   Pulse 64   SpO2 100%      64  Disposition: results routed to provider.  Check with Florencia Ledesma RN before releasing patient  Arlene TURCIOS MA

## 2021-02-01 ENCOUNTER — MYC MEDICAL ADVICE (OUTPATIENT)
Dept: INTERNAL MEDICINE | Facility: CLINIC | Age: 76
End: 2021-02-01

## 2021-02-02 ENCOUNTER — MYC MEDICAL ADVICE (OUTPATIENT)
Dept: INTERNAL MEDICINE | Facility: CLINIC | Age: 76
End: 2021-02-02

## 2021-02-02 DIAGNOSIS — D50.0 IRON DEFICIENCY ANEMIA DUE TO CHRONIC BLOOD LOSS: ICD-10-CM

## 2021-02-02 LAB
FERRITIN SERPL-MCNC: 16 NG/ML (ref 26–388)
HGB BLD-MCNC: 13.6 G/DL (ref 13.3–17.7)

## 2021-02-02 PROCEDURE — 82728 ASSAY OF FERRITIN: CPT | Performed by: INTERNAL MEDICINE

## 2021-02-02 PROCEDURE — 36415 COLL VENOUS BLD VENIPUNCTURE: CPT | Performed by: INTERNAL MEDICINE

## 2021-02-02 PROCEDURE — 85018 HEMOGLOBIN: CPT | Performed by: INTERNAL MEDICINE

## 2021-02-03 ENCOUNTER — MYC MEDICAL ADVICE (OUTPATIENT)
Dept: INTERNAL MEDICINE | Facility: CLINIC | Age: 76
End: 2021-02-03

## 2021-02-03 ENCOUNTER — PATIENT OUTREACH (OUTPATIENT)
Dept: NURSING | Facility: CLINIC | Age: 76
End: 2021-02-03
Payer: COMMERCIAL

## 2021-02-03 NOTE — PROGRESS NOTES
"Clinic Care Coordination Contact    Follow Up Progress Note      Assessment: Patient calling clinic care coordinator.     Patient states he received his lab results via [facility] and the \"look terrible\" . He is also concerned that there are no iron binding results (per chart review it does not appear these were ordered)     Patient states his hemoglobin is 13.6 and his feritin is 16 \"which is a big drop from December \" (it was 72 in December)    Patient has sent a [facility] message to PCP. Advised that today is PCP's day off so he may not get a response until later this week. Patient states he doesn't know if he can wait that long, he might have to go to the ED.\"    Patient states he is fatigued, feels like he is dragging around. Does a few things then needs to rest. Patient denies shortness of breath, even with exertion.  He states he is having trouble sleeping. Feels restless, he seeps for 3-4 hours, wakes, and then sleeps for 3-4 hours.    He has occasional headache. He states he is worried about \"passing out\"  He denies feeling light headed or dizzy.     Patient states he is drinking plenty of fluids. He states he is often thirsty and wakes in the night with dry mouth.  He is not using any humidifier in his home.   States he continues to take his protonix daily. He states he thinks he is going to start to take ferrous sulfate again. He states PCP told him he could take this if he was feeling tired.     He states it is hard to concentrate. He feels he is eating well, but has lost a few pounds since last fall. He most recent weight was 166 pounds and he states he normally weighs around 173.    His candi are a little looser than normal.    Patient talks about his concerns for covid-19. Follows this closely on TV and online. He states he is on the wait list for the vaccine.     Patient states he is feeling better now that he had lunch and we have been talking. He states he thinks he can wait to have a conversation with " "PCP later this week. Reassured patient      Goals addressed this encounter: Feeling worse currently  Goals Addressed                 This Visit's Progress      Medical (pt-stated)   10%     Goal Statement: I want to feel \"like my old self again\"     Date Goal set: 4/27/2020    Barriers: Iron deficiency    Strengths: Motivated    Date to Achieve By: February 2020    Patient expressed understanding of goal: Yes    Action steps to achieve this goal:  1. I will have iron infusions as needed  2. I will eat a healthy diet  3. I will get adequate sleep  4. I will stay home, social distance, wear a mask and perform good handwashing to decrease exposure to covid-19 pandemic         Intervention/Education provided during outreach: Listening, encouragement, reassurance.    Decrease in his anxiety level was noted.     Outreach Frequency: monthly    Plan:   Patient will discuss next steps with PCP.    Care Coordinator will follow up in one month.    Martha Lang RN, [facility] - Primary Care Clinic RN Coordinator  [facility]   2/3/2021      [phone]  "

## 2021-02-04 ENCOUNTER — MYC MEDICAL ADVICE (OUTPATIENT)
Dept: INTERNAL MEDICINE | Facility: CLINIC | Age: 76
End: 2021-02-04

## 2021-03-04 ENCOUNTER — IMMUNIZATION (OUTPATIENT)
Dept: NURSING | Facility: CLINIC | Age: 76
End: 2021-03-04
Payer: COMMERCIAL

## 2021-03-04 PROCEDURE — 91300 PR COVID VAC PFIZER DIL RECON 30 MCG/0.3 ML IM: CPT

## 2021-03-04 PROCEDURE — 0001A PR COVID VAC PFIZER DIL RECON 30 MCG/0.3 ML IM: CPT

## 2021-03-05 ENCOUNTER — PATIENT OUTREACH (OUTPATIENT)
Dept: NURSING | Facility: CLINIC | Age: 76
End: 2021-03-05
Payer: COMMERCIAL

## 2021-03-05 NOTE — PROGRESS NOTES
"Clinic Care Coordination Contact    Follow Up Progress Note      Assessment: Patient calling clinic care coordinator.    Patient states he got his first covid-19 vaccination yesterday (3/4/21) He states he feels greatly relieved and less stressed.  Patient talks about all the concerns with getting the vaccine, not enough doses, he feels there was poor communications from the governor. Many opinions expressed.    Patient goes on and on with his thoughts, he talks over any attempt to discuss even when he has asked a question of writer.     Patient states he now feels a lot of his not feeling well and episodes of diarrhea may have been caused by his stress as he was so worried about getting the covid-19 vaccine.     Patient states he has been on protonix for close to a year now and wonders if he should continue taking. He has been reading about it online. Advised patient to continue taking medication until he speaks with his doctor. Advised he make an appointment with [facility], Dr. Ennis,  And discuss his concerns or ideas.       We discussed his diet at length. He states salads sometimes give him abdominal discomfort and an episode of loose stools.     Patient continues to have labs checked at least monthly for hemoglobin and ferritin.      Goals addressed this encounter: Improving  Goals Addressed                 This Visit's Progress      Medical (pt-stated)   50%     Goal Statement: I want to feel \"like my old self again\"     Date Goal set: 4/27/2020    Barriers: Iron deficiency    Strengths: Motivated    Date to Achieve By:May 2021     Patient expressed understanding of goal: Yes    Action steps to achieve this goal:  1. I will have iron infusions as needed  2. I will eat a healthy diet  3. I will get adequate sleep  4. I will stay home, social distance, wear a mask and perform good handwashing to decrease exposure to covid-19 pandemic         Intervention/Education provided during outreach: On the phone for over an " hour.      Outreach Frequency: monthly    Plan:   Patient will have second covid vaccination.    Care Coordinator will follow up in one month    Martha Lang RN, [facility] - Primary Care Clinic RN Coordinator  [facility]   3/5/2021    10:05 AM  [phone]

## 2021-03-21 ENCOUNTER — MYC MEDICAL ADVICE (OUTPATIENT)
Dept: INTERNAL MEDICINE | Facility: CLINIC | Age: 76
End: 2021-03-21

## 2021-03-21 DIAGNOSIS — D50.0 IRON DEFICIENCY ANEMIA DUE TO CHRONIC BLOOD LOSS: Primary | ICD-10-CM

## 2021-03-22 ENCOUNTER — PATIENT OUTREACH (OUTPATIENT)
Dept: NURSING | Facility: CLINIC | Age: 76
End: 2021-03-22
Payer: COMMERCIAL

## 2021-03-22 NOTE — TELEPHONE ENCOUNTER
Was patient suppose to get these labs again.?    Cassandra CASTILLO CMA ([facility])

## 2021-03-22 NOTE — TELEPHONE ENCOUNTER
Yes. He needs standing orders for the year at a time. I have again placed these orders. He's free to more or less come and have these laboratory studies completed whenever he feels his weakness coming on but generally once a month is going to be enough for screening him.    Isaac Knutson MD

## 2021-03-22 NOTE — PROGRESS NOTES
"Clinic Care Coordination Contact    Follow Up Progress Note      Assessment: Patient calling clinic care coordinator.    Patient states he woke up before 6 am this morning with a headache, stuffiness, dizzy, feeling weak and a little nauseous.     He states he was very \"dry, and dehydrated.\". He states he drank several bottles of water, had some peanut butter toast and hot chocolate and is now feeling better. Advised he try gatorade or pedialyte as well for electrolyte replacement.    Patient denies any signs of bleeding. States he has a normal BM this morning. No indication of bleeding.     He is scheduled for his second covid-19 vaccination this Thursday and is very anxious that \"something would interfere with him getting this.\" He has been very anxious regarding covid-19.     We discussed at length his symptoms and he came to the conclusion that it is his allergies and sinus congestion. He states he notice the Allegra he routinely takes \"made him feel funny\" so he is stopping that and going to take sudafed instead. He talks about the high winds the last few days and the dry air and mold outside. These all bother him a great deal.      Patient states he is going to go back to bed for a nap and hopefully feel better.       Goals addressed this encounter: Declining today  Goals Addressed                 This Visit's Progress      Medical (pt-stated)   30%     Goal Statement: I want to feel \"like my old self again\"     Date Goal set: 4/27/2020    Barriers: Iron deficiency    Strengths: Motivated    Date to Achieve By:May 2021     Patient expressed understanding of goal: Yes    Action steps to achieve this goal:  1. I will have iron infusions as needed  2. I will eat a healthy diet  3. I will get adequate sleep  4. I will stay home, social distance, wear a mask and perform good handwashing to decrease exposure to covid-19 pandemic         Intervention/Education provided during outreach: As above. Lots of reassurance " and encouragement.     Outreach Frequency: monthly    Plan:   Patient will seek medical attention if symptoms worsen.    Care Coordinator will follow up in one month.    Martha Lang RN, [facility] - Primary Care Clinic RN Coordinator  [facility]   3/22/2021    8:38 AM  [phone]

## 2021-03-22 NOTE — LETTER
[facility]  Complex Care Plan  About Me:    Patient Name:  Chi Newman    YOB: 1945  Age:         76 year old   Hannah MRN:    [id number] Telephone Information:  Home Phone [phone]   Mobile [phone]       Address:  [address] [place] [address] Email address:  [email or web address]      Emergency Contact(s)    Name Relationship Lgl Grd Work Phone Home Phone Mobile Phone   1. TIFFANIE VARMA Daughter No  [phone]           Primary language:  English     needed? No   [employer]:  [phone] op. 1  Other communication barriers: Glasses  Preferred Method of Communication:  Mail  Current living arrangement:  I live in private home  Mobility Status/ Medical Equipment:  None    Health Maintenance  Health Maintenance Reviewed:   Health Maintenance Due   Topic Date Due     AORTIC ANEURYSM SCREENING (SYSTEM ASSIGNED)  Never done     HEPATITIS B IMMUNIZATION (2 of 3 - Risk 3-dose series) 11/20/2020     COVID-19 Vaccine (2 - Pfizer 2-dose series) 03/25/2021        My Access Plan  Medical Emergency 911   Primary Clinic Line [facility] - [phone]   24 Hour Appointment Line [phone] or  [phone]) (toll-free)   24 Hour Nurse Line [phone] (toll-free)   Preferred Urgent Care     Preferred Hospital     Preferred Pharmacy [facility] - [address] [place]     Behavioral Health Crisis Line The National Suicide Prevention Lifeline at 1-672.407.2541 or 911             My Care Team Members  Patient Care Team       Relationship Specialty Notifications Start End    Isaac Knutson MD PCP - General Internal Medicine  7/2/13     Phone: [phone] Fax: [phone] [address] [place] [address]    Isaac Knutson MD Assigned PCP   10/27/13     Phone: [phone] Fax: [phone] [address] [address]    Ramiro  "Fawad Barbour MD  Ophthalmology  1/5/16     Phone: [phone] Fax: [phone]         [address] [address] [facility] [address]    Martha Lang, RN Lead Care Coordinator Primary Care - [facility] Admissions 4/27/20     Phone: [phone]         Connie Martinez MD Assigned Surgical Provider   10/23/20     Phone: [phone] Fax: [phone] [address] [facility] [address]            My Care Plans  Self Management and Treatment Plan  Goals and (Comments)  Goals        General    Medical (pt-stated)     Notes - Note edited  3/5/2021  9:40 AM by Martha Lang RN    Goal Statement: I want to feel \"like my old self again\"     Date Goal set: 4/27/2020    Barriers: Iron deficiency    Strengths: Motivated    Date to Achieve By:May 2021     Patient expressed understanding of goal: Yes    Action steps to achieve this goal:  1. I will have iron infusions as needed  2. I will eat a healthy diet  3. I will get adequate sleep  4. I will stay home, social distance, wear a mask and perform good handwashing to decrease exposure to covid-19 pandemic              Action Plans on File:                       Advance Care Plans/Directives Type:        My Medical and Care Information  Problem List   Patient Active Problem List   Diagnosis     Health Care Home     Hyperlipidemia with target LDL less than 130     Diverticulosis of colon     Elevated liver enzymes     Hypovitaminosis D     Environmental allergies     Seasonal allergic rhinitis     Pneumonia     BPH (benign prostatic hyperplasia)     Diverticulitis of colon     Chronic nonalcoholic liver disease     Microscopic hematuria     Umbilical hernia     Elevated glucose     Elevated prostate specific antigen (PSA)     Drusen of macula of both eyes     Posterior vitreous detachment of right eye     Nonexudative senile macular degeneration of retina     Senile nuclear sclerosis, bilateral     Slow transit constipation     Vitreous syneresis of " left eye     PVD (posterior vitreous detachment), both eyes     History of diverticulitis of colon     History of partial colectomy     Meibomian gland dysfunction (MGD) of both eyes     Dry eye of right side     Gastric erosion, unspecified chronicity     Iron deficiency anemia due to chronic blood loss     Iron deficiency anemia, unspecified      Current Medications and Allergies:  See printed Medication Report.    Care Coordination Start Date: 4/27/2020   Frequency of Care Coordination: monthly   Form Last Updated: 03/22/2021

## 2021-03-25 ENCOUNTER — IMMUNIZATION (OUTPATIENT)
Dept: NURSING | Facility: CLINIC | Age: 76
End: 2021-03-25
Attending: FAMILY MEDICINE
Payer: COMMERCIAL

## 2021-03-25 DIAGNOSIS — Z90.49 HISTORY OF PARTIAL COLECTOMY: ICD-10-CM

## 2021-03-25 PROCEDURE — 0002A PR COVID VAC PFIZER DIL RECON 30 MCG/0.3 ML IM: CPT

## 2021-03-25 PROCEDURE — 91300 PR COVID VAC PFIZER DIL RECON 30 MCG/0.3 ML IM: CPT

## 2021-03-26 ENCOUNTER — PATIENT OUTREACH (OUTPATIENT)
Dept: NURSING | Facility: CLINIC | Age: 76
End: 2021-03-26
Payer: COMMERCIAL

## 2021-03-26 RX ORDER — PANTOPRAZOLE SODIUM 40 MG/1
TABLET, DELAYED RELEASE ORAL
Qty: 90 TABLET | Refills: 1 | Status: SHIPPED | OUTPATIENT
Start: 2021-03-26 | End: 2021-09-19

## 2021-03-26 NOTE — PROGRESS NOTES
"Clinic Care Coordination Contact    Follow Up Progress Note      Assessment: Patient calling clinic care coordinator.     Patient states he was able to make it to his second covid-19 dose. He was a little dizzy that morning but it passed and he was able to go. He is greatly relieved to now be protected.  We discussed need to continue to wear a mask and social distance as not enough people are vaccinated yet.  He agrees    Patient is convinced that his feeling ill on Monday and Tuesday of this week was a delayed reaction to his first covid- 19 vaccination.  He states he does not anticipate any side effects from the second dose as he has already gone through them     Patient talks about covid-19 variants and increasing numbers and other parts of the [place] not following precautions and opening everything up. He has many opinions and rambling thoughts.     Patient talks about the immigrants crossing into the [place] from [place]. He fees they will bring more covid-19. He feels they should be cared for but then states the whole process will bring more problems.     He talks about the covid-19 relief package and how all of us will have to pay for this. He states taxes will go up, cost of living will go up, people will lose their investments and the government will collapse. He is reading about this online.     Patient states he is thinking about moving some where warmer, he is not sure her wants to go through another cold winter in [place]. He does not know where and his daughter and grand children are here.        Goals addressed this encounter:   Goals Addressed                 This Visit's Progress      Medical (pt-stated)   40%     Goal Statement: I want to feel \"like my old self again\"     Date Goal set: 4/27/2020    Barriers: Iron deficiency    Strengths: Motivated    Date to Achieve By:May 2021     Patient expressed understanding of goal: Yes    Action steps to achieve this goal:  1. I will have iron infusions as " needed  2. I will eat a healthy diet  3. I will get adequate sleep  4. I will stay home, social distance, wear a mask and perform good handwashing to decrease exposure to covid-19 pandemic         Intervention/Education provided during outreach: Listening     Outreach Frequency: monthly    Plan:   Patient will stay safe    Care Coordinator will follow up in one month.    Martha Lagn RN, [facility] - Primary Care Clinic RN Coordinator  [facility]   3/26/2021     [phone]

## 2021-04-05 DIAGNOSIS — D50.0 IRON DEFICIENCY ANEMIA DUE TO CHRONIC BLOOD LOSS: ICD-10-CM

## 2021-04-05 LAB
BASOPHILS # BLD AUTO: 0.1 10E9/L (ref 0–0.2)
BASOPHILS NFR BLD AUTO: 0.7 %
DIFFERENTIAL METHOD BLD: ABNORMAL
EOSINOPHIL # BLD AUTO: 0.2 10E9/L (ref 0–0.7)
EOSINOPHIL NFR BLD AUTO: 2.2 %
ERYTHROCYTE [DISTWIDTH] IN BLOOD BY AUTOMATED COUNT: 13.9 % (ref 10–15)
FERRITIN SERPL-MCNC: 9 NG/ML (ref 26–388)
HCT VFR BLD AUTO: 36.7 % (ref 40–53)
HGB BLD-MCNC: 11.8 G/DL (ref 13.3–17.7)
IRON SATN MFR SERPL: 6 % (ref 15–46)
IRON SERPL-MCNC: 23 UG/DL (ref 35–180)
LYMPHOCYTES # BLD AUTO: 1.5 10E9/L (ref 0.8–5.3)
LYMPHOCYTES NFR BLD AUTO: 18.4 %
MCH RBC QN AUTO: 26.2 PG (ref 26.5–33)
MCHC RBC AUTO-ENTMCNC: 32.2 G/DL (ref 31.5–36.5)
MCV RBC AUTO: 82 FL (ref 78–100)
MONOCYTES # BLD AUTO: 0.7 10E9/L (ref 0–1.3)
MONOCYTES NFR BLD AUTO: 8.7 %
NEUTROPHILS # BLD AUTO: 5.7 10E9/L (ref 1.6–8.3)
NEUTROPHILS NFR BLD AUTO: 70 %
PLATELET # BLD AUTO: 364 10E9/L (ref 150–450)
RBC # BLD AUTO: 4.5 10E12/L (ref 4.4–5.9)
TIBC SERPL-MCNC: 362 UG/DL (ref 240–430)
WBC # BLD AUTO: 8.2 10E9/L (ref 4–11)

## 2021-04-05 PROCEDURE — 83550 IRON BINDING TEST: CPT | Performed by: INTERNAL MEDICINE

## 2021-04-05 PROCEDURE — 36415 COLL VENOUS BLD VENIPUNCTURE: CPT | Performed by: INTERNAL MEDICINE

## 2021-04-05 PROCEDURE — 83540 ASSAY OF IRON: CPT | Performed by: INTERNAL MEDICINE

## 2021-04-05 PROCEDURE — 82728 ASSAY OF FERRITIN: CPT | Performed by: INTERNAL MEDICINE

## 2021-04-05 PROCEDURE — 85025 COMPLETE CBC W/AUTO DIFF WBC: CPT | Performed by: INTERNAL MEDICINE

## 2021-04-06 ENCOUNTER — TELEPHONE (OUTPATIENT)
Dept: FAMILY MEDICINE | Facility: CLINIC | Age: 76
End: 2021-04-06

## 2021-04-06 NOTE — TELEPHONE ENCOUNTER
We reviewed his situation and symptoms     He's more tired and taking more naps. Slower moving.    I agreed to place an order for an iron transfusion because we see absolutely unremitting loss of iron and it is absolutely a given that he needs the iron transfusion , it's only a question of when. Due to his symptoms he is going to be scheduled for the iron transfusion now. - the orders are already in place. Patient recommended to call [facility] infusion center     Isaac Knutson MD

## 2021-04-06 NOTE — TELEPHONE ENCOUNTER
Patient called provider back to discuss what the plan is. Patient said if going to do infusions needs to set up at [facility]. Please call patient.  Gricelda Zhang  Team Natalio,

## 2021-04-07 ENCOUNTER — PATIENT OUTREACH (OUTPATIENT)
Dept: NURSING | Facility: CLINIC | Age: 76
End: 2021-04-07
Payer: COMMERCIAL

## 2021-04-07 NOTE — PROGRESS NOTES
"Clinic Care Coordination Contact    Follow Up Progress Note      Assessment: Patient calling RN clinic care coordinator.    Patient had labs drawn and he has low ferritin and iron readings.  He states he had started to feel more tired, was going to be an hour earlier each night so he was not so tired in the morning. He states he has some dizziness and generalized achy feeling.      Patient spoke to PCP and was instructed that he will need iron infusions again. They discussed when and because patient is symptomatic it was decided he would have these now. Patient is scheduled for iron infusion at [facility] on April 19th and 27th.     Patient states that he will probably need iron infusions once or twice a year from now on. He denies signs of active bleeding.     Patient talks about his diet. He is trying to eat foods high in iron. He states he really likes cream of wheat and finds he can eat that any time. He feels he has a good understanding of his diet.     Patient states he needs to start with more exercise. He has been too sedentary over winter and with the pandemic. He states he used to go to the gym on a regular basis but he is not sure he wants to take a chance on that yet.  Patient is fully vaccinated for covid-19 but remains cautious. He states he is going to start walking more now that the weather is getting warmer.    Patient talks at length about covid-19, new developments he sees on the Internet, new strains, vaccine challenges, people not complying with masking and social distancing. Concerns that people will not get the vaccine and we will not reach \"herd immunity\" Talks about his own reaction to the first injection.     Patient states he is not able to take Allegra for his allergies any more. He is not sure why, but it makes him feel \"overdosed\" He is now using sudafed, which seems to be effective. He states he flushes his sinuses daily as he does not want to get a sinus infection.  " Problem: Dysphagia (Adult)  Goal: Compensatory Techniques to Improve Safety/Function with Swallowing  Outcome: Ongoing (interventions implemented as appropriate)    10/01/17 0894   Dysphagia (Adult)   Compensatory Techniques to Improve Safety/Function with Swallowing making progress toward outcome            "He states having a little more humidity in the air after a recent rain shower is helpful, dry air is worse for him. He states he will have his duct work cleaned this spring to cut down on dust. He states when everything blooms he will have more trouble again.     Patient feels he should see an ENT for recommendations on his chronic sinus issues.  Provided information on [place] [facility] providers.       Goals addressed this encounter: Will feel better after infusions  Goals Addressed                 This Visit's Progress      Medical (pt-stated)   40%     Goal Statement: I want to feel \"like my old self again\"     Date Goal set: 4/27/2020    Barriers: Iron deficiency    Strengths: Motivated    Date to Achieve By:May 2021     Patient expressed understanding of goal: Yes    Action steps to achieve this goal:  1. I will have iron infusions as needed  2. I will eat a healthy diet  3. I will get adequate sleep  4. I will stay home, social distance, wear a mask and perform good handwashing to decrease exposure to covid-19 pandemic         Intervention/Education provided during outreach: Listening     Outreach Frequency: monthly    Plan:   Patient will have iron infusions as scheduled    Care Coordinator will follow up in one month.    Martha Lang RN, [facility] - Primary Care Clinic RN Coordinator  [facility]   4/7/2021    10:33 AM  [phone]  "

## 2021-04-16 RX ORDER — HEPARIN SODIUM,PORCINE 10 UNIT/ML
5 VIAL (ML) INTRAVENOUS
Status: CANCELLED | OUTPATIENT
Start: 2021-04-16

## 2021-04-16 RX ORDER — HEPARIN SODIUM (PORCINE) LOCK FLUSH IV SOLN 100 UNIT/ML 100 UNIT/ML
5 SOLUTION INTRAVENOUS
Status: CANCELLED | OUTPATIENT
Start: 2021-04-16

## 2021-04-16 NOTE — PROGRESS NOTES
Message reviewed , orders signed     Please Reroute if additional input requested from me , otherwise close this encounter     Isaac Knutson MD

## 2021-04-16 NOTE — TELEPHONE ENCOUNTER
Huddled with Dr Knutson. OK for verbal orders for Injectafer. Pharmacist advised she can sign on his behalf with verbal orders. Verbal given to pharmacist.     Mary Ann Abraham RN

## 2021-04-16 NOTE — TELEPHONE ENCOUNTER
Spoke with Nery at [facility] Infusion [facility]. Transferred to Infusion pharmacist.     Per pharmacist therapy plan is in place but no order for Injectafer    Pharmacist will put orders in and route to provider to sign.     Mary Ann Abraham RN

## 2021-04-16 NOTE — TELEPHONE ENCOUNTER
I have been asked by the infusion staff from [facility] to sign or update orders for patients scheduled iron transfusion for 4/19. I ,have signed these orders twice and I am not sure this is being received or completed. Please help see to it that these orders have benefit properly implemented and if not please help me to make sure these orders are update as per request from [facility] infusion center     Isaac Knutson MD

## 2021-04-19 ENCOUNTER — INFUSION THERAPY VISIT (OUTPATIENT)
Dept: INFUSION THERAPY | Facility: CLINIC | Age: 76
End: 2021-04-19
Payer: COMMERCIAL

## 2021-04-19 VITALS
TEMPERATURE: 98.8 F | RESPIRATION RATE: 18 BRPM | SYSTOLIC BLOOD PRESSURE: 128 MMHG | OXYGEN SATURATION: 100 % | WEIGHT: 171 LBS | HEART RATE: 66 BPM | BODY MASS INDEX: 28.68 KG/M2 | DIASTOLIC BLOOD PRESSURE: 69 MMHG

## 2021-04-19 DIAGNOSIS — K25.9 GASTRIC EROSION, UNSPECIFIED CHRONICITY: Primary | ICD-10-CM

## 2021-04-19 DIAGNOSIS — D50.0 IRON DEFICIENCY ANEMIA DUE TO CHRONIC BLOOD LOSS: ICD-10-CM

## 2021-04-19 PROCEDURE — 96365 THER/PROPH/DIAG IV INF INIT: CPT | Performed by: NURSE PRACTITIONER

## 2021-04-19 PROCEDURE — 99207 PR NO CHARGE LOS: CPT

## 2021-04-19 RX ORDER — FERROUS SULFATE 325(65) MG
325 TABLET, DELAYED RELEASE (ENTERIC COATED) ORAL DAILY
COMMUNITY
End: 2023-09-08

## 2021-04-19 RX ORDER — HEPARIN SODIUM,PORCINE 10 UNIT/ML
5 VIAL (ML) INTRAVENOUS
Status: CANCELLED | OUTPATIENT
Start: 2021-04-26

## 2021-04-19 RX ORDER — HEPARIN SODIUM (PORCINE) LOCK FLUSH IV SOLN 100 UNIT/ML 100 UNIT/ML
5 SOLUTION INTRAVENOUS
Status: CANCELLED | OUTPATIENT
Start: 2021-04-26

## 2021-04-19 RX ADMIN — Medication 250 ML: at 15:01

## 2021-04-19 NOTE — PROGRESS NOTES
Infusion Nursing Note:  Chi Newman presents today for Injectafer.    Patient seen by provider today: No   present during visit today: Not Applicable.    Note: N/A.  Patient declined the covid-19 test.    Intravenous Access:  Peripheral IV placed.    Treatment Conditions:  Not Applicable.      Post Infusion Assessment:  Patient tolerated infusion without incident.       Discharge Plan:   Patient discharged in stable condition accompanied by: self.    Charo Larkin RN

## 2021-04-20 DIAGNOSIS — Z91.09 ENVIRONMENTAL ALLERGIES: ICD-10-CM

## 2021-04-20 RX ORDER — FLUTICASONE PROPIONATE 50 MCG
SPRAY, SUSPENSION (ML) NASAL
Qty: 16 G | Refills: 4 | Status: SHIPPED | OUTPATIENT
Start: 2021-04-20 | End: 2021-09-13

## 2021-04-20 NOTE — TELEPHONE ENCOUNTER
Last Written Prescription Date:  12/16/20  Last Fill Quantity: 16g,  # refills: 3   Last office visit: 1/14/2021 with prescribing provider:  Dr. Knutson with advised yearly F/U.  Future Office Visit: none    Prescription approved per [facility] Refill Protocol.    Thelma Su RN, BSN  [facility]

## 2021-04-27 ENCOUNTER — INFUSION THERAPY VISIT (OUTPATIENT)
Dept: INFUSION THERAPY | Facility: CLINIC | Age: 76
End: 2021-04-27
Payer: COMMERCIAL

## 2021-04-27 VITALS
SYSTOLIC BLOOD PRESSURE: 149 MMHG | HEART RATE: 65 BPM | RESPIRATION RATE: 18 BRPM | TEMPERATURE: 98.3 F | OXYGEN SATURATION: 100 % | DIASTOLIC BLOOD PRESSURE: 75 MMHG

## 2021-04-27 DIAGNOSIS — D50.0 IRON DEFICIENCY ANEMIA DUE TO CHRONIC BLOOD LOSS: ICD-10-CM

## 2021-04-27 DIAGNOSIS — K25.9 GASTRIC EROSION, UNSPECIFIED CHRONICITY: Primary | ICD-10-CM

## 2021-04-27 PROCEDURE — 99207 PR NO CHARGE LOS: CPT

## 2021-04-27 PROCEDURE — 96365 THER/PROPH/DIAG IV INF INIT: CPT | Performed by: NURSE PRACTITIONER

## 2021-04-27 RX ORDER — HEPARIN SODIUM (PORCINE) LOCK FLUSH IV SOLN 100 UNIT/ML 100 UNIT/ML
5 SOLUTION INTRAVENOUS
Status: CANCELLED | OUTPATIENT
Start: 2021-05-03

## 2021-04-27 RX ORDER — HEPARIN SODIUM,PORCINE 10 UNIT/ML
5 VIAL (ML) INTRAVENOUS
Status: CANCELLED | OUTPATIENT
Start: 2021-05-03

## 2021-04-27 RX ADMIN — Medication 250 ML: at 14:26

## 2021-04-27 ASSESSMENT — PAIN SCALES - GENERAL: PAINLEVEL: NO PAIN (0)

## 2021-04-27 NOTE — PROGRESS NOTES
Infusion Nursing Note:  Chi Newman presents today for: Injectafer #2/2.    Patient seen by provider today: No   present during visit today: Not Applicable.    Note: patient reports body aches and HA post infusion, day 4.  States he had no symptoms day 1-3, other than some fatigue.    He experienced none of these symptoms back in Oct 2020 when he received Injectafer    Intravenous Access:  Peripheral IV placed.    Treatment Conditions:  Not Applicable.      Post Infusion Assessment:  Patient tolerated infusion without incident.       Discharge Plan:   Lab recheck in June as scheduled.    KIERSTEN DE RN

## 2021-05-11 ENCOUNTER — PATIENT OUTREACH (OUTPATIENT)
Dept: NURSING | Facility: CLINIC | Age: 76
End: 2021-05-11
Payer: COMMERCIAL

## 2021-05-11 NOTE — PROGRESS NOTES
"Clinic Care Coordination Contact    Follow Up Progress Note      Assessment: Outreach to patient.     Patient states he has had two iron infusions during this review period, the last one on 4/27/21.      Patient states he has \"had crazy reactions to the infusion.\"   He describes vague symptoms of headache, joint aches. He states he does not remember having these symptoms when he had previous infusions. Patient continues to expound on his symptoms.    He states the first person attempted to start his IV for the infusion failed twice. He then asked her to have someone else start the IV. He states the second person had no problems. He talks about being worried that he would get an infection in his hand from the missed attempt. This has now healed without incident.     Patient states he has not been out of the house this week as his allergies are bad with everything blooming.  He states he has not been able to walk and exercise due to this. He states he does not feel his Allegra is working so he has been taking over the counter Sudafed. He feels that is working. We discussed he may benefit from wearing a mask when going outside so he can walk as this would filter some of his allergens.    Patient talks about the covid pandemic and how scared he was during the worst of virus. He is concerned that people will not get immunized and there will be another outbreak this fall and it might even be worse.    Patient has been fully vaccinated and he states he was able to see his grandchildren.         Goals addressed this encounter: Goal is met  Goals Addressed                 This Visit's Progress      COMPLETED: Medical (pt-stated)   100%     Goal Statement: I want to feel \"like my old self again\"     Date Goal set: 4/27/2020    Barriers: Iron deficiency    Strengths: Motivated    Date to Achieve By:May 2021     Patient expressed understanding of goal: Yes    Action steps to achieve this goal:  1. I will have iron infusions as " needed  2. I will eat a healthy diet  3. I will get adequate sleep  4. I will stay home, social distance, wear a mask and perform good handwashing to decrease exposure to covid-19 pandemic            Intervention/Education provided during outreach: Reassurance, listening     Plan:   Patient will continue to have routine lab work as ordered for hemoglobin and ferritin.    Care Coordinator will close to active care coordination at this time.     Martha Lang RN, [facility] - Primary Care Clinic RN Coordinator  [facility]   5/11/2021    1:19 PM  [phone]

## 2021-05-14 ENCOUNTER — OFFICE VISIT (OUTPATIENT)
Dept: OPHTHALMOLOGY | Facility: CLINIC | Age: 76
End: 2021-05-14
Payer: COMMERCIAL

## 2021-05-14 DIAGNOSIS — H25.13 NUCLEAR SCLEROTIC CATARACT OF BOTH EYES: ICD-10-CM

## 2021-05-14 DIAGNOSIS — H04.123 DRY EYE SYNDROME, BILATERAL: Primary | ICD-10-CM

## 2021-05-14 DIAGNOSIS — H35.3131 EARLY DRY STAGE NONEXUDATIVE AGE-RELATED MACULAR DEGENERATION OF BOTH EYES: ICD-10-CM

## 2021-05-14 DIAGNOSIS — H43.813 PVD (POSTERIOR VITREOUS DETACHMENT), BOTH EYES: ICD-10-CM

## 2021-05-14 PROCEDURE — 92012 INTRM OPH EXAM EST PATIENT: CPT | Performed by: STUDENT IN AN ORGANIZED HEALTH CARE EDUCATION/TRAINING PROGRAM

## 2021-05-14 RX ORDER — TOBRAMYCIN AND DEXAMETHASONE 3; 1 MG/ML; MG/ML
1 SUSPENSION/ DROPS OPHTHALMIC 3 TIMES DAILY
Qty: 5 ML | Refills: 0 | Status: SHIPPED | OUTPATIENT
Start: 2021-05-14 | End: 2022-06-28

## 2021-05-14 ASSESSMENT — SLIT LAMP EXAM - LIDS
COMMENTS: 1+ MEIBOMIAN GLAND DYSFUNCTION
COMMENTS: 1+ MEIBOMIAN GLAND DYSFUNCTION

## 2021-05-14 ASSESSMENT — VISUAL ACUITY
CORRECTION_TYPE: GLASSES
OS_PH_CC: 20/20
OD_CC: 20/40
OS_CC: 20/30
METHOD: SNELLEN - LINEAR
OD_PH_CC: 20/25+3

## 2021-05-14 ASSESSMENT — EXTERNAL EXAM - RIGHT EYE: OD_EXAM: NORMAL

## 2021-05-14 ASSESSMENT — EXTERNAL EXAM - LEFT EYE: OS_EXAM: NORMAL

## 2021-05-14 NOTE — PATIENT INSTRUCTIONS
Continue gel at bedtime and artificial tears often     Tobradex: One drop right eye three times a day for 7- 10 days.     Use a warm compress on the eyes daily.     Connie Martinez MD  [phone]

## 2021-05-14 NOTE — LETTER
5/14/2021         RE: Chi Newman  [address] [place] [address]  [place] [address]        Dear Colleague,    Thank you for referring your patient, Chi Newman, to the [facility]. Please see a copy of my visit note below.     Current Eye Medications:   Systane tears frequently and genteal gel nightly     Subjective:  Eye pain and blurriness right eye   Pt has had pain in right eye for the past several days -  feels like getting poked in this eye. Also his vision in the right eye is also blurred and the eye feels strained. Pt states he has had this problem before.     Objective:  See Ophthalmology Exam.       Assessment:  Chi Newman is a 76 year old male who presents with:   Encounter Diagnoses   Name Primary?     Dry eye syndrome, bilateral      Early dry stage nonexudative age-related macular degeneration of both eyes      Nuclear sclerotic cataract of both eyes      PVD (posterior vitreous detachment), both eyes        Plan:  Continue gel at bedtime and artificial tears often     Tobradex: One drop right eye three times a day for 7- 10 days.     Use a warm compress on the eyes daily.     Connie Martinez MD  [phone]        Again, thank you for allowing me to participate in the care of your patient.        Sincerely,        Connie Martinez MD

## 2021-05-14 NOTE — PROGRESS NOTES
Current Eye Medications:   Systane tears frequently and genteal gel nightly     Subjective:  Eye pain and blurriness right eye   Pt has had pain in right eye for the past several days -  feels like getting poked in this eye. Also his vision in the right eye is also blurred and the eye feels strained. Pt states he has had this problem before.     Objective:  See Ophthalmology Exam.       Assessment:  Chi Newman is a 76 year old male who presents with:   Encounter Diagnoses   Name Primary?     Dry eye syndrome, bilateral      Early dry stage nonexudative age-related macular degeneration of both eyes      Nuclear sclerotic cataract of both eyes      PVD (posterior vitreous detachment), both eyes        Plan:  Continue gel at bedtime and artificial tears often     Tobradex: One drop right eye three times a day for 7- 10 days.     Use a warm compress on the eyes daily.     Connie Martinez MD  [phone]

## 2021-05-24 ENCOUNTER — PATIENT OUTREACH (OUTPATIENT)
Dept: NURSING | Facility: CLINIC | Age: 76
End: 2021-05-24
Payer: COMMERCIAL

## 2021-05-24 NOTE — PROGRESS NOTES
"Clinic Care Coordination Contact    Follow Up Progress Note      Assessment: patient calling RN clinic care coordinator.    Patient states since there has been hot and humid weather the last several days, he has noticed that his BM's are darker. He wonders if this is due to the heat.     Patient denies that stools are black. They are formed and of normal consistency, not tarry. No signs of bleeding.     We discussed at length that the heat is probably not the cause for the color of his tool, more likely any changes in his diet due to the heat. He states he has not been as hungry and is often either skipping a meal or only eating cereal for dinner. We also discussed at length the need to make sure he is drinking adequate fluids. He states he drinks gatorade and also water.     Patient states he is anxious to see what his lab results will be in June. He had IV iron infusions and is hopeful that he will not need any more for at least several months. Patient states he had \"strange feelings during the last infusion.\" Can not identify anything specific.     Patient is very aware and concerned about his health. Becomes anxious and requires reassurance.      Plan:   Patient will have labs completed as ordered.    Follow up with PCP as indicated.     Martha Lang RN, [facility] - Primary Care Clinic RN Coordinator  [facility]   5/24/2021      [phone]     "

## 2021-06-01 ENCOUNTER — MYC MEDICAL ADVICE (OUTPATIENT)
Dept: INTERNAL MEDICINE | Facility: CLINIC | Age: 76
End: 2021-06-01

## 2021-06-01 DIAGNOSIS — N52.9 ERECTILE DYSFUNCTION, UNSPECIFIED ERECTILE DYSFUNCTION TYPE: Primary | ICD-10-CM

## 2021-06-01 DIAGNOSIS — D50.0 IRON DEFICIENCY ANEMIA DUE TO CHRONIC BLOOD LOSS: ICD-10-CM

## 2021-06-01 LAB
FERRITIN SERPL-MCNC: 209 NG/ML (ref 26–388)
HGB BLD-MCNC: 14.4 G/DL (ref 13.3–17.7)

## 2021-06-01 PROCEDURE — 82728 ASSAY OF FERRITIN: CPT | Performed by: INTERNAL MEDICINE

## 2021-06-01 PROCEDURE — 85018 HEMOGLOBIN: CPT | Performed by: INTERNAL MEDICINE

## 2021-06-01 PROCEDURE — 36415 COLL VENOUS BLD VENIPUNCTURE: CPT | Performed by: INTERNAL MEDICINE

## 2021-06-03 ENCOUNTER — MYC MEDICAL ADVICE (OUTPATIENT)
Dept: INTERNAL MEDICINE | Facility: CLINIC | Age: 76
End: 2021-06-03

## 2021-09-09 DIAGNOSIS — Z91.09 ENVIRONMENTAL ALLERGIES: ICD-10-CM

## 2021-09-13 RX ORDER — FLUTICASONE PROPIONATE 50 MCG
SPRAY, SUSPENSION (ML) NASAL
Qty: 16 G | Refills: 3 | Status: SHIPPED | OUTPATIENT
Start: 2021-09-13 | End: 2022-01-21

## 2021-09-13 NOTE — TELEPHONE ENCOUNTER
Prescription approved per [facility] Refill Protocol.    Rachel Ledesma RN  [facility]

## 2021-09-16 DIAGNOSIS — Z90.49 HISTORY OF PARTIAL COLECTOMY: ICD-10-CM

## 2021-09-19 RX ORDER — PANTOPRAZOLE SODIUM 40 MG/1
TABLET, DELAYED RELEASE ORAL
Qty: 90 TABLET | Refills: 0 | Status: SHIPPED | OUTPATIENT
Start: 2021-09-19 | End: 2021-12-17

## 2021-09-26 ENCOUNTER — HEALTH MAINTENANCE LETTER (OUTPATIENT)
Age: 76
End: 2021-09-26

## 2021-10-19 ENCOUNTER — MYC MEDICAL ADVICE (OUTPATIENT)
Dept: INTERNAL MEDICINE | Facility: CLINIC | Age: 76
End: 2021-10-19

## 2021-10-29 ENCOUNTER — OFFICE VISIT (OUTPATIENT)
Dept: INTERNAL MEDICINE | Facility: CLINIC | Age: 76
End: 2021-10-29
Payer: COMMERCIAL

## 2021-10-29 VITALS
HEART RATE: 71 BPM | TEMPERATURE: 98.7 F | BODY MASS INDEX: 27.19 KG/M2 | RESPIRATION RATE: 19 BRPM | SYSTOLIC BLOOD PRESSURE: 134 MMHG | HEIGHT: 66 IN | DIASTOLIC BLOOD PRESSURE: 68 MMHG | WEIGHT: 169.2 LBS | OXYGEN SATURATION: 100 %

## 2021-10-29 DIAGNOSIS — J01.90 SUBACUTE SINUSITIS, UNSPECIFIED LOCATION: Primary | ICD-10-CM

## 2021-10-29 PROCEDURE — 99212 OFFICE O/P EST SF 10 MIN: CPT | Performed by: INTERNAL MEDICINE

## 2021-10-29 RX ORDER — AMOXICILLIN 250 MG
CAPSULE ORAL
COMMUNITY

## 2021-10-29 ASSESSMENT — PAIN SCALES - GENERAL: PAINLEVEL: NO PAIN (0)

## 2021-10-29 ASSESSMENT — MIFFLIN-ST. JEOR: SCORE: 1443.11

## 2021-10-29 NOTE — PROGRESS NOTES
"  Assessment & Plan     Subacute sinusitis, unspecified location  This patient is seen for nonspecific symptoms of dizziness and fatigue. His symptoms had actually improved over the last few days and he considered cancelling this appointment but decided to keep it anyways  - REVIEW OF HEALTH MAINTENANCE PROTOCOL ORDERS      Return in about 6 months (around 4/29/2022).    Isaac Knutson MD  [facility] Mireles is a 76 year old who presents for the following health issues   HPI   Chief Complaint   Patient presents with     Weakness     Difficult sleeping, foggy headed, and dizziness for about two weeks      His symptoms seem to have developed primarily after the time he received his covid booster / 3rd immunization for Coronavirus (COVID-19) 9/30/21. He has had some vague symptoms ever since then.    His symptoms started right around the first week after this boster    Sore arm and some lowgrade symptoms that first week    But then dizziness symptoms and forgetfulness around just a little bit over a week ago. Not getting enough sleep, no fever, started with sinus symptoms - stuffy nose and hard to breathe, some \" sinus pain\" he means soreness above his eyes, with some extension of symptoms to the back / neck. He tried using some sinus irrigation with nasal saline washes , 2 times a day and noted symptoms getting worse . He feels lots of greenish sputum / gunky stuff came out. This cleared everything up, headaches slowly improved, using acetaminophen with some definite benefit     He called then for the appointment today a week ago.  He had also some diarrhea , since resolved      Review of Systems         Objective    /68 (BP Location: Right arm, Cuff Size: Adult Regular)   Pulse 71   Temp 98.7  F (37.1  C) (Oral)   Resp 19   Ht 1.665 m (5' 5.55\")   Wt 76.7 kg (169 lb 3.2 oz)   SpO2 100%   BMI 27.68 kg/m    Body mass index is 27.68 kg/m .  Physical Exam   GENERAL: " healthy, alert and no distress  HENT: ear canals and TM's normal, nose and mouth without ulcers or lesions  NECK: no adenopathy, no asymmetry, masses, or scars and thyroid normal to palpation  RESP: lungs clear to auscultation - no rales, rhonchi or wheezes  CV: regular rate and rhythm, normal S1 S2, no S3 or S4, no murmur, click or rub, no peripheral edema and peripheral pulses strong  ABDOMEN: soft, nontender, no hepatosplenomegaly, no masses and bowel sounds normal  MS: no gross musculoskeletal defects noted, no edema    Orders Placed This Encounter   Procedures     REVIEW OF HEALTH MAINTENANCE PROTOCOL ORDERS

## 2021-11-04 ENCOUNTER — OFFICE VISIT (OUTPATIENT)
Dept: OPHTHALMOLOGY | Facility: CLINIC | Age: 76
End: 2021-11-04
Payer: COMMERCIAL

## 2021-11-04 DIAGNOSIS — H35.3131 EARLY DRY STAGE NONEXUDATIVE AGE-RELATED MACULAR DEGENERATION OF BOTH EYES: ICD-10-CM

## 2021-11-04 DIAGNOSIS — H43.813 PVD (POSTERIOR VITREOUS DETACHMENT), BOTH EYES: ICD-10-CM

## 2021-11-04 DIAGNOSIS — H04.123 DRY EYE SYNDROME, BILATERAL: Primary | ICD-10-CM

## 2021-11-04 DIAGNOSIS — H02.886 MEIBOMIAN GLAND DYSFUNCTION (MGD) OF BOTH EYES: ICD-10-CM

## 2021-11-04 DIAGNOSIS — H02.883 MEIBOMIAN GLAND DYSFUNCTION (MGD) OF BOTH EYES: ICD-10-CM

## 2021-11-04 DIAGNOSIS — H25.13 NUCLEAR SCLEROTIC CATARACT OF BOTH EYES: ICD-10-CM

## 2021-11-04 PROCEDURE — 92012 INTRM OPH EXAM EST PATIENT: CPT | Performed by: STUDENT IN AN ORGANIZED HEALTH CARE EDUCATION/TRAINING PROGRAM

## 2021-11-04 RX ORDER — TOBRAMYCIN AND DEXAMETHASONE 3; 1 MG/ML; MG/ML
1 SUSPENSION/ DROPS OPHTHALMIC 4 TIMES DAILY
Qty: 5 ML | Refills: 1 | Status: SHIPPED | OUTPATIENT
Start: 2021-11-04 | End: 2022-04-20

## 2021-11-04 ASSESSMENT — EXTERNAL EXAM - LEFT EYE: OS_EXAM: NORMAL

## 2021-11-04 ASSESSMENT — VISUAL ACUITY
OD_PH_CC: 20/40
OD_CC: 20/40
OS_CC+: -2
OS_CC: 20/20
METHOD: SNELLEN - LINEAR
CORRECTION_TYPE: GLASSES
OD_CC+: -2

## 2021-11-04 ASSESSMENT — CUP TO DISC RATIO
OD_RATIO: 0.5 UD
OS_RATIO: 0.5 UD

## 2021-11-04 ASSESSMENT — EXTERNAL EXAM - RIGHT EYE: OD_EXAM: NORMAL

## 2021-11-04 ASSESSMENT — SLIT LAMP EXAM - LIDS
COMMENTS: 1+ MEIBOMIAN GLAND DYSFUNCTION
COMMENTS: 1+ MEIBOMIAN GLAND DYSFUNCTION

## 2021-11-04 ASSESSMENT — TONOMETRY
OD_IOP_MMHG: 16
IOP_METHOD: ICARE
OS_IOP_MMHG: 15

## 2021-11-04 NOTE — LETTER
"    11/4/2021         RE: Chi Newman  [address] [facility] [address]  [place] [address]        Dear Colleague,    Thank you for referring your patient, Chi Newman, to the [facility]. Please see a copy of my visit note below.     Current Eye Medications:  Genteal artificial tears three times a day right eye, and twice a day right eye.  Genteal gel every evening right eye.  Hot compresses as needed for comfortable.      Subjective:  Patient states he currently has an annual eye infection in his right eye \"which occurs around this time of year.\"  Right eye is sore, and has sharp pains, especially in the evenings.  He uses Genteal, but this has not been helping.  Right eye wakes him during the night secondary to throbbing - he usually uses hot compresses which help minimally.  He takes Extra Strength Tylenol for the pain.  Vision is intermittently blurry, and \"glossey -looking\" in his right eye.    In the past, Dr. Martinez prescribed Tobradex which has helped.     He wanted to add that \"he is getting old\" so the light is bothering him more.     Objective:  See Ophthalmology Exam.      Assessment:  Chi Newman is a 76 year old male who presents with:   Encounter Diagnoses   Name Primary?     Dry eye syndrome, bilateral      Meibomian gland dysfunction (MGD) of both eyes      Early dry stage nonexudative age-related macular degeneration of both eyes      Nuclear sclerotic cataract of both eyes      PVD (posterior vitreous detachment), both eyes      Plan:  Use Tobradex: One drop right eye three times a day for 7- 10 days.    Continue artificial tears often in both eyes and gel at bedtime      Use a warm compress on the eyes daily.     Connie Martinez MD  [phone]               Again, thank you for allowing me to participate in the care of your patient.        Sincerely,        Connie Martinez MD    "

## 2021-11-04 NOTE — PROGRESS NOTES
" Current Eye Medications:  Genteal artificial tears three times a day right eye, and twice a day right eye.  Genteal gel every evening right eye.  Hot compresses as needed for comfortable.      Subjective:  Patient states he currently has an annual eye infection in his right eye \"which occurs around this time of year.\"  Right eye is sore, and has sharp pains, especially in the evenings.  He uses Genteal, but this has not been helping.  Right eye wakes him during the night secondary to throbbing - he usually uses hot compresses which help minimally.  He takes Extra Strength Tylenol for the pain.  Vision is intermittently blurry, and \"glossey -looking\" in his right eye.    In the past, Dr. Martinez prescribed Tobradex which has helped.     He wanted to add that \"he is getting old\" so the light is bothering him more.     Objective:  See Ophthalmology Exam.      Assessment:  Chi Newman is a 76 year old male who presents with:   Encounter Diagnoses   Name Primary?     Dry eye syndrome, bilateral      Meibomian gland dysfunction (MGD) of both eyes      Early dry stage nonexudative age-related macular degeneration of both eyes      Nuclear sclerotic cataract of both eyes      PVD (posterior vitreous detachment), both eyes      Plan:  Use Tobradex: One drop right eye three times a day for 7- 10 days.    Continue artificial tears often in both eyes and gel at bedtime      Use a warm compress on the eyes daily.     Connie Martinez MD  [phone]           "

## 2021-11-04 NOTE — PATIENT INSTRUCTIONS
Use Tobradex: One drop right eye three times a day for 7- 10 days.    Continue artificial tears often in both eyes and gel at bedtime      Use a warm compress on the eyes daily.     Connie Martinez MD  [phone]

## 2021-12-01 DIAGNOSIS — R79.89 LOW VITAMIN D LEVEL: ICD-10-CM

## 2021-12-01 RX ORDER — CHOLECALCIFEROL (VITAMIN D3) 25 MCG
TABLET ORAL
Qty: 300 TABLET | Refills: 1 | Status: SHIPPED | OUTPATIENT
Start: 2021-12-01

## 2021-12-15 DIAGNOSIS — Z90.49 HISTORY OF PARTIAL COLECTOMY: ICD-10-CM

## 2021-12-17 RX ORDER — PANTOPRAZOLE SODIUM 40 MG/1
TABLET, DELAYED RELEASE ORAL
Qty: 90 TABLET | Refills: 0 | Status: SHIPPED | OUTPATIENT
Start: 2021-12-17 | End: 2022-03-17

## 2021-12-17 NOTE — TELEPHONE ENCOUNTER
Prescription approved per [facility] Refill Protocol.  Justina Ann RN

## 2021-12-20 ENCOUNTER — MYC MEDICAL ADVICE (OUTPATIENT)
Dept: OPHTHALMOLOGY | Facility: CLINIC | Age: 76
End: 2021-12-20
Payer: COMMERCIAL

## 2022-01-19 DIAGNOSIS — Z91.09 ENVIRONMENTAL ALLERGIES: ICD-10-CM

## 2022-01-21 RX ORDER — FLUTICASONE PROPIONATE 50 MCG
SPRAY, SUSPENSION (ML) NASAL
Qty: 48 G | Refills: 0 | Status: SHIPPED | OUTPATIENT
Start: 2022-01-21 | End: 2022-06-01

## 2022-03-09 ENCOUNTER — TELEPHONE (OUTPATIENT)
Dept: FAMILY MEDICINE | Facility: CLINIC | Age: 77
End: 2022-03-09
Payer: COMMERCIAL

## 2022-03-09 DIAGNOSIS — D50.0 IRON DEFICIENCY ANEMIA DUE TO CHRONIC BLOOD LOSS: ICD-10-CM

## 2022-03-09 DIAGNOSIS — E78.5 HYPERLIPIDEMIA WITH TARGET LDL LESS THAN 130: Primary | ICD-10-CM

## 2022-03-09 NOTE — TELEPHONE ENCOUNTER
Reason for call:  Other   Patient called regarding (reason for call): call back  Additional comments:   Patient states if the provider wants him to have labs before his annual then can they be added to the cart for scheduling?  The patient has post Covid concerns and his eyesight seems to have changed.  Please call patient to discuss.      Phone number to reach patient:  Cell number on file:    Telephone Information:   Mobile [phone]       Best Time:  any    Can we leave a detailed message on this number?  YES    Travel screening: Not Applicable

## 2022-03-09 NOTE — TELEPHONE ENCOUNTER
"HMPO orders were already signed in October 2021 and still current  FLP is the only lab due per  and lab should be able to draw this.     Per patient, he had a runny nose, general weakness, dizziness, headaches in January and did a COVID19 antigen test which was negative.  However, he is not sure he trust the results and thinks he could have had COVID19 at the time.    Patient reports that he was starting to lose vision in the right eye and saw Dr. Martinez, ophthalmology, \"it all happened around the same time.\"  He stated, \"it turns out, I had an eye infection\"  Explained that he saw Dr. Martinez for this in November 2021 which was a couple of months before his COVID19 symptoms in January 2022.  He stated he has been using eye drops as recommended but his eye symptoms are still bothering him.  He is afraid if he contacts ophthalmology, he will be sent to another eye specialist in [facility].  Explained that this is not Isaac Wilkerson's area of specialty and follow-up with ophthalmology would be more appropriate for this.   Patient agreed to have the message sent to ophthalmology for further recommendations.    Isaac Wilkersno- are there any other labs you want ordered besides the FLP?    Ophthalmology team- any further recommendations on continued eye symptoms? Patient wondering if he should follow-up sooner. Please call patient back    Jl Segura RN  [facility]  "

## 2022-03-10 NOTE — TELEPHONE ENCOUNTER
Reached out to patient. Notified him of Dr. Knutson's message below. He asked multiple questions about appointment date/time, labs, his history, etc. Writer assisted patient and confirmed his future care.     SAM GarzaN PRINCESS  [facility]

## 2022-03-13 ENCOUNTER — HEALTH MAINTENANCE LETTER (OUTPATIENT)
Age: 77
End: 2022-03-13

## 2022-04-04 ENCOUNTER — LAB (OUTPATIENT)
Dept: LAB | Facility: CLINIC | Age: 77
End: 2022-04-04
Payer: COMMERCIAL

## 2022-04-04 ENCOUNTER — MYC MEDICAL ADVICE (OUTPATIENT)
Dept: INTERNAL MEDICINE | Facility: CLINIC | Age: 77
End: 2022-04-04

## 2022-04-04 DIAGNOSIS — D50.0 IRON DEFICIENCY ANEMIA DUE TO CHRONIC BLOOD LOSS: ICD-10-CM

## 2022-04-04 DIAGNOSIS — E78.5 HYPERLIPIDEMIA WITH TARGET LDL LESS THAN 130: ICD-10-CM

## 2022-04-04 DIAGNOSIS — D50.0 IRON DEFICIENCY ANEMIA DUE TO CHRONIC BLOOD LOSS: Primary | ICD-10-CM

## 2022-04-04 LAB
CHOLEST SERPL-MCNC: 163 MG/DL
FASTING STATUS PATIENT QL REPORTED: YES
FERRITIN SERPL-MCNC: 5 NG/ML (ref 26–388)
HDLC SERPL-MCNC: 42 MG/DL
HGB BLD-MCNC: 9.5 G/DL (ref 13.3–17.7)
LDLC SERPL CALC-MCNC: 95 MG/DL
NONHDLC SERPL-MCNC: 121 MG/DL
TRIGL SERPL-MCNC: 129 MG/DL

## 2022-04-04 PROCEDURE — 36415 COLL VENOUS BLD VENIPUNCTURE: CPT

## 2022-04-04 PROCEDURE — 82728 ASSAY OF FERRITIN: CPT

## 2022-04-04 PROCEDURE — 80061 LIPID PANEL: CPT

## 2022-04-04 PROCEDURE — 85018 HEMOGLOBIN: CPT

## 2022-04-05 RX ORDER — DIPHENHYDRAMINE HYDROCHLORIDE 50 MG/ML
50 INJECTION INTRAMUSCULAR; INTRAVENOUS
Status: CANCELLED
Start: 2022-04-05

## 2022-04-05 RX ORDER — HEPARIN SODIUM,PORCINE 10 UNIT/ML
5 VIAL (ML) INTRAVENOUS
Status: CANCELLED | OUTPATIENT
Start: 2022-04-05

## 2022-04-05 RX ORDER — ALBUTEROL SULFATE 0.83 MG/ML
2.5 SOLUTION RESPIRATORY (INHALATION)
Status: CANCELLED | OUTPATIENT
Start: 2022-04-05

## 2022-04-05 RX ORDER — HEPARIN SODIUM (PORCINE) LOCK FLUSH IV SOLN 100 UNIT/ML 100 UNIT/ML
5 SOLUTION INTRAVENOUS
Status: CANCELLED | OUTPATIENT
Start: 2022-04-05

## 2022-04-05 RX ORDER — EPINEPHRINE 1 MG/ML
0.3 INJECTION, SOLUTION, CONCENTRATE INTRAVENOUS EVERY 5 MIN PRN
Status: CANCELLED | OUTPATIENT
Start: 2022-04-05

## 2022-04-05 RX ORDER — METHYLPREDNISOLONE SODIUM SUCCINATE 125 MG/2ML
125 INJECTION, POWDER, LYOPHILIZED, FOR SOLUTION INTRAMUSCULAR; INTRAVENOUS
Status: CANCELLED
Start: 2022-04-05

## 2022-04-05 RX ORDER — NALOXONE HYDROCHLORIDE 0.4 MG/ML
0.2 INJECTION, SOLUTION INTRAMUSCULAR; INTRAVENOUS; SUBCUTANEOUS
Status: CANCELLED | OUTPATIENT
Start: 2022-04-05

## 2022-04-05 RX ORDER — MEPERIDINE HYDROCHLORIDE 25 MG/ML
25 INJECTION INTRAMUSCULAR; INTRAVENOUS; SUBCUTANEOUS EVERY 30 MIN PRN
Status: CANCELLED | OUTPATIENT
Start: 2022-04-05

## 2022-04-05 RX ORDER — ALBUTEROL SULFATE 90 UG/1
1-2 AEROSOL, METERED RESPIRATORY (INHALATION)
Status: CANCELLED
Start: 2022-04-05

## 2022-04-05 ASSESSMENT — ENCOUNTER SYMPTOMS
DYSURIA: 0
SHORTNESS OF BREATH: 0
WEAKNESS: 1
PALPITATIONS: 0
EYE PAIN: 1
NERVOUS/ANXIOUS: 1
HEMATOCHEZIA: 0
DIARRHEA: 0
NAUSEA: 0
ARTHRALGIAS: 1
HEMATURIA: 0
SORE THROAT: 0
FEVER: 0
CONSTIPATION: 0
DIZZINESS: 1
COUGH: 0
HEADACHES: 1
ABDOMINAL PAIN: 0
CHILLS: 1
PARESTHESIAS: 1
MYALGIAS: 1
JOINT SWELLING: 0
HEARTBURN: 0
FREQUENCY: 0

## 2022-04-05 ASSESSMENT — ACTIVITIES OF DAILY LIVING (ADL): CURRENT_FUNCTION: NO ASSISTANCE NEEDED

## 2022-04-08 ENCOUNTER — OFFICE VISIT (OUTPATIENT)
Dept: INTERNAL MEDICINE | Facility: CLINIC | Age: 77
End: 2022-04-08
Payer: COMMERCIAL

## 2022-04-08 VITALS
SYSTOLIC BLOOD PRESSURE: 136 MMHG | TEMPERATURE: 98.1 F | WEIGHT: 169 LBS | DIASTOLIC BLOOD PRESSURE: 68 MMHG | OXYGEN SATURATION: 100 % | RESPIRATION RATE: 14 BRPM | BODY MASS INDEX: 28.16 KG/M2 | HEIGHT: 65 IN | HEART RATE: 63 BPM

## 2022-04-08 DIAGNOSIS — Z13.6 ENCOUNTER FOR ABDOMINAL AORTIC ANEURYSM (AAA) SCREENING: ICD-10-CM

## 2022-04-08 DIAGNOSIS — Z00.00 ENCOUNTER FOR MEDICARE ANNUAL WELLNESS EXAM: Primary | ICD-10-CM

## 2022-04-08 DIAGNOSIS — Z90.49 HISTORY OF PARTIAL COLECTOMY: ICD-10-CM

## 2022-04-08 DIAGNOSIS — I10 ESSENTIAL HYPERTENSION WITH GOAL BLOOD PRESSURE LESS THAN 140/90: ICD-10-CM

## 2022-04-08 PROCEDURE — 99397 PER PM REEVAL EST PAT 65+ YR: CPT | Performed by: INTERNAL MEDICINE

## 2022-04-08 RX ORDER — PANTOPRAZOLE SODIUM 40 MG/1
40 TABLET, DELAYED RELEASE ORAL DAILY
Qty: 90 TABLET | Refills: 3 | Status: SHIPPED | OUTPATIENT
Start: 2022-04-08 | End: 2022-10-12 | Stop reason: DRUGHIGH

## 2022-04-08 RX ORDER — HYDROCHLOROTHIAZIDE 12.5 MG/1
12.5 CAPSULE ORAL DAILY
Qty: 90 CAPSULE | Refills: 3 | Status: SHIPPED | OUTPATIENT
Start: 2022-04-08 | End: 2023-04-22

## 2022-04-08 ASSESSMENT — ENCOUNTER SYMPTOMS
ABDOMINAL PAIN: 0
HEADACHES: 1
CHILLS: 1
CONSTIPATION: 0
FEVER: 0
EYE PAIN: 1
PARESTHESIAS: 1
DYSURIA: 0
PALPITATIONS: 0
HEMATOCHEZIA: 0
COUGH: 0
DIZZINESS: 1
SORE THROAT: 0
SHORTNESS OF BREATH: 0
HEARTBURN: 0
NERVOUS/ANXIOUS: 1
ARTHRALGIAS: 1
MYALGIAS: 1
JOINT SWELLING: 0
WEAKNESS: 1
HEMATURIA: 0
FREQUENCY: 0
DIARRHEA: 0
NAUSEA: 0

## 2022-04-08 ASSESSMENT — ACTIVITIES OF DAILY LIVING (ADL): CURRENT_FUNCTION: NO ASSISTANCE NEEDED

## 2022-04-08 NOTE — PATIENT INSTRUCTIONS
Patient Education   Personalized Prevention Plan  You are due for the preventive services outlined below.  Your care team is available to assist you in scheduling these services.  If you have already completed any of these items, please share that information with your care team to update in your medical record.  Health Maintenance Due   Topic Date Due     AORTIC ANEURYSM SCREENING (SYSTEM ASSIGNED)  Never done     Hepatitis B Vaccine (2 of 3 - Risk 3-dose series) 11/20/2020     Eye Exam  12/11/2021     FALL RISK ASSESSMENT  01/14/2022       Understanding USDA MyPlate  The USDA has guidelines to help you make healthy food choices. These are called MyPlate. MyPlate shows the food groups that make up healthy meals using the image of a place setting. Before you eat, think about the healthiest choices for what to put on your plate or in your cup or bowl. To learn more about building a healthy plate, visit www.[employer] [place]gov.    The food groups    Fruits. Any fruit or 100% fruit juice counts as part of the Fruit Group. Fruits may be fresh, canned, frozen, or dried, and may be whole, cut-up, or pureed. Make 1/2 of your plate fruits and vegetables.    Vegetables. Any vegetable or 100% vegetable juice counts as a member of the Vegetable Group. Vegetables may be fresh, frozen, canned, or dried. They can be served raw or cooked and may be whole, cut-up, or mashed. Make 1/2 of your plate fruits and vegetables.    Grains. All foods made from grains are part of the Grains Group. These include wheat, rice, oats, cornmeal, and barley. Grains are often used to make foods such as bread, pasta, oatmeal, cereal, tortillas, and grits. Grains should be no more than 1/4 of your plate. At least half of your grains should be whole grains.    Protein. This group includes meat, poultry, seafood, beans and peas, eggs, processed soy products (such as tofu), nuts (including nut butters), and seeds. Make protein choices no more than 1/4 of  your plate. Meat and poultry choices should be lean or low fat.    Dairy. The Dairy Group includes all fluid milk products and foods made from milk that contain calcium, such as yogurt and cheese. (Foods that have little calcium, such as cream, butter, and cream cheese, are not part of this group.) Most dairy choices should be low-fat or fat-free.    Oils. Oils aren't a food group, but they do contain essential nutrients. However it's important to watch your intake of oils. These are fats that are liquid at room temperature. They include canola, corn, olive, soybean, vegetable, and sunflower oil. Foods that are mainly oil include mayonnaise, certain salad dressings, and soft margarines. You likely already get your daily oil allowance from the foods you eat.  Things to limit  Eating healthy also means limiting these things in your diet:       Salt (sodium). Many processed foods have a lot of sodium. To keep sodium intake down, eat fresh vegetables, meats, poultry, and seafood when possible. Purchase low-sodium, reduced-sodium, or no-salt-added food products at the store. And don't add salt to your meals at home. Instead, season them with herbs and spices such as dill, oregano, cumin, and paprika. Or try adding flavor with lemon or lime zest and juice.    Saturated fat. Saturated fats are most often found in animal products such as beef, pork, and chicken. They are often solid at room temperature, such as butter. To reduce your saturated fat intake, choose leaner cuts of meat and poultry. And try healthier cooking methods such as grilling, broiling, roasting, or baking. For a simple lower-fat swap, use plain nonfat yogurt instead of mayonnaise when making potato salad or macaroni salad.    Added sugars. These are sugars added to foods. They are in foods such as ice cream, candy, soda, fruit drinks, sports drinks, energy drinks, cookies, pastries, jams, and syrups. Cut down on added sugars by sharing sweet treats with  a family member or friend. You can also choose fruit for dessert, and drink water or other unsweetened beverages.     [employer] last reviewed this educational content on 6/1/2020 2000-2021 [employer]. All rights reserved. This information is not intended as a substitute for professional medical care. Always follow your healthcare professional's instructions.          Signs of Hearing Loss      Hearing much better with one ear can be a sign of hearing loss.   Hearing loss is a problem shared by many people. In fact, it is one of the most common health problems, particularly as people age. Most people age 65 and older have some hearing loss. By age 80, almost everyone does. Hearing loss often occurs slowly over the years. So you may not realize your hearing has gotten worse.  Have your hearing checked  Call your healthcare provider if you:    Have to strain to hear normal conversation    Have to watch other people s faces very carefully to follow what they re saying    Need to ask people to repeat what they ve said    Often misunderstand what people are saying    Turn the volume of the television or radio up so high that others complain    Feel that people are mumbling when they re talking to you    Find that the effort to hear leaves you feeling tired and irritated    Notice, when using the phone, that you hear better with one ear than the other  [employer] last reviewed this educational content on 1/1/2020 2000-2021 [employer]. All rights reserved. This information is not intended as a substitute for professional medical care. Always follow your healthcare professional's instructions.

## 2022-04-08 NOTE — PROGRESS NOTES
"SUBJECTIVE:   Chi Newman is a 77 year old male who presents for Preventive Visit.      Patient has been advised of split billing requirements and indicates understanding: Yes  Are you in the first 12 months of your Medicare coverage?  No    Healthy Habits:     In general, how would you rate your overall health?  Good    Frequency of exercise:  2-3 days/week    Duration of exercise:  15-30 minutes    Do you usually eat at least 4 servings of fruit and vegetables a day, include whole grains    & fiber and avoid regularly eating high fat or \"junk\" foods?  No    Taking medications regularly:  Yes    Medication side effects:  No muscle aches, No significant flushing and Lightheadedness    Ability to successfully perform activities of daily living:  No assistance needed    Home Safety:  No safety concerns identified    Hearing Impairment:  Difficulty following a conversation in a noisy restaurant or crowded room, need to ask people to speak up or repeat themselves and difficulty understanding soft or whispered speech    In the past 6 months, have you been bothered by leaking of urine?  No    In general, how would you rate your overall mental or emotional health?  Good      PHQ-2 Total Score: 0    Additional concerns today:  No    Last eye exam was with Dr. Connie Martinez, ophthalmologist with [facility] December 2020 with following diagnoses       Assessment:  Chi Newman is a 75 year old male who presents with:        Encounter Diagnoses   Name Primary?     Examination of eyes and vision       Myopia of both eyes with regular astigmatism and presbyopia          Early dry stage nonexudative age-related macular degeneration of both eyes Stable.      Nuclear sclerotic cataract of both eyes Not visually significant      PVD (posterior vitreous detachment), both eyes       Dry eye syndrome, bilateral      He has a follow up eye exam planned for next month     Wt Readings from Last 5 Encounters: "   04/08/22 76.7 kg (169 lb)   10/29/21 76.7 kg (169 lb 3.2 oz)   04/19/21 77.6 kg (171 lb)   01/14/21 75.3 kg (166 lb)   10/16/20 78 kg (172 lb)     Body mass index is 28.12 kg/m .     Do you feel safe in your environment? Yes    Have you ever done Advance Care Planning? (For example, a Health Directive, POLST, or a discussion with a medical provider or your loved ones about your wishes): Yes, patient states has an Advance Care Planning document and will bring a copy to the clinic.    Fall risk  Fallen 2 or more times in the past year?: No  Any fall with injury in the past year?: No    Cognitive Screening   1) Repeat 3 items (Leader, Season, Table)    2) Clock draw: NORMAL  3) 3 item recall: Recalls 3 objects  Results: 3 items recalled: COGNITIVE IMPAIRMENT LESS LIKELY    Mini-CogTM Copyright ALEXUS Fischer. Licensed by the author for use in [facility]; reprinted with permission ([email or web address]). All rights reserved.      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 These issues are reviewed in significant detail . Continue current plan of care  At this point     Reviewed and updated as needed this visit by Provider                 Social History     Tobacco Use     Smoking status: Never Smoker     Smokeless tobacco: Never Used   Substance Use Topics     Alcohol use: Not Currently     Alcohol/week: 0.0 standard drinks     Comment: Occasional glass of wine and beer.     If you drink alcohol do you typically have >3 drinks per day or >7 drinks per week? No    Alcohol Use 4/5/2022   Prescreen: >3 drinks/day or >7 drinks/week? No   Prescreen: >3 drinks/day or >7 drinks/week? -   No flowsheet data found.      Current providers sharing in care for this patient include:   Patient  Care Team:  Isaac Knutson MD as PCP - General (Internal Medicine)  Isaac Knutson MD as Assigned PCP  Fawad Robles MD (Ophthalmology)  Connie Martinez MD as Assigned Surgical Provider    The following health maintenance items are reviewed in Epic and correct as of today:  Health Maintenance Due   Topic Date Due     AORTIC ANEURYSM SCREENING (SYSTEM ASSIGNED)  Never done     HEPATITIS B IMMUNIZATION (2 of 3 - Risk 3-dose series) 11/20/2020     EYE EXAM  12/11/2021     MEDICARE ANNUAL WELLNESS VISIT  01/14/2022     FALL RISK ASSESSMENT  01/14/2022     Lab work is in process  Labs reviewed in EPIC  BP Readings from Last 3 Encounters:   04/08/22 136/68   10/29/21 134/68   04/27/21 (!) 149/75    Wt Readings from Last 3 Encounters:   04/08/22 76.7 kg (169 lb)   10/29/21 76.7 kg (169 lb 3.2 oz)   04/19/21 77.6 kg (171 lb)                  Patient Active Problem List   Diagnosis     Health Care Home     Hyperlipidemia with target LDL less than 130     Diverticulosis of colon     Elevated liver enzymes     Hypovitaminosis D     Environmental allergies     Seasonal allergic rhinitis     Pneumonia     BPH (benign prostatic hyperplasia)     Diverticulitis of colon     Chronic nonalcoholic liver disease     Microscopic hematuria     Umbilical hernia     Elevated glucose     Elevated prostate specific antigen (PSA)     [place]us[place] of macula of both eyes     Posterior vitreous detachment of right eye     Nonexudative senile macular degeneration of retina     Senile nuclear sclerosis, bilateral     Slow transit constipation     Vitreous syneresis of left eye     PVD (posterior vitreous detachment), both eyes     History of diverticulitis of colon     History of partial colectomy     Meibomian gland dysfunction (MGD) of both eyes     Dry eye of right side     Gastric erosion, unspecified chronicity     Iron deficiency anemia due to chronic blood loss     Iron deficiency anemia, unspecified     Past Surgical  History:   Procedure Laterality Date     ABDOMEN SURGERY      Diverticulitus     ARTHROSCOPY KNEE  89    right knee, LMT     ARTHROSCOPY KNEE  1988    left knee, MMT     BIOPSY      Colonoscopy / Endoscopy     COLONOSCOPY  10/7/2002    diverticulosis, due again in      COLONOSCOPY  2013     SMALL BOWEL RESECTION  3/19/2014    small bowel resection, lysis of adhesions and pelvic drain placement     TONSILLECTOMY & ADENOIDECTOMY  age of 15       Social History     Tobacco Use     Smoking status: Never Smoker     Smokeless tobacco: Never Used   Substance Use Topics     Alcohol use: Not Currently     Alcohol/week: 0.0 standard drinks     Comment: Occasional glass of wine and beer.     Family History   Problem Relation Age of Onset     Cancer Mother         Colon Cancer     Colon Cancer Mother          of Colon/Intestinal cancer     Diabetes Brother         My brother  from complications of diabetes     Glaucoma No family hx of      Macular Degeneration No family hx of          Current Outpatient Medications   Medication Sig Dispense Refill     acetaminophen (TYLENOL) 500 MG tablet Take 500-1,000 mg by mouth every 6 hours as needed       Cobalamin Combinations (B-12) 100-5000 MCG SUBL        Ferrous Gluconate (IRON 27 PO)        ferrous sulfate (FE TABS) 325 (65 Fe) MG EC tablet Take 325 mg by mouth daily       fexofenadine (ALLEGRA) 180 MG tablet Take 1 tablet (180 mg) by mouth daily Take 180 mg by mouth once daily. Indications: SEASONAL ALLERGIC RHINITIS 90 tablet 3     fluticasone (FLONASE) 50 MCG/ACT nasal spray USE ONE TO TWO SPRAYS IN EACH NOSTRIL EVERY DAY 48 g 0     hydrochlorothiazide (MICROZIDE) 12.5 MG capsule Take 1 capsule (12.5 mg) by mouth daily 30 capsule 5     hypromellose (GENTEAL) ophthalmic gel 0.3% Place 1 drop into both eyes every hour as needed for dry eyes       Multiple Vitamins-Minerals (ICAPS AREDS 2 PO)        pantoprazole (PROTONIX) 40 MG EC tablet TAKE ONE TABLET BY  MOUTH ONCE DAILY 90 tablet 0     Propylene Glycol (SYSTANE BALANCE OP) Apply 1 drop to eye 4 times daily       STATIN NOT PRESCRIBED (INTENTIONAL) Please choose reason not prescribed, below       tobramycin-dexamethasone (TOBRADEX) 0.3-0.1 % ophthalmic suspension Place 1 drop into both eyes 3 times daily 5 mL 0     triamcinolone (KENALOG) 0.1 % external cream Apply topically 2 times daily 453.6 g 0     VITAMIN D3 25 MCG (1000 UT) tablet TAKE THREE TABLETS BY MOUTH ONCE DAILY 300 tablet 1     tobramycin-dexamethasone (TOBRADEX) 0.3-0.1 % ophthalmic suspension Place 1 drop into the right eye 4 times daily 5 mL 1     Allergies   Allergen Reactions     Atorvastatin      Ezetimibe-Simvastatin Muscle Pain (Myalgia)     Vytorin      Vancomycin Rash     Rash/hives     Vancomycin Rash     Recent Labs   Lab Test 04/04/22  1030 01/14/21  1235 01/13/20  1418 11/29/18  1501 10/31/17  1221 10/18/16  1357   A1C  --  5.5 5.4 5.7* 5.4 5.5   LDL 95 124* 82 Cannot estimate LDL when triglyceride exceeds 400 mg/dL  126* 135*  --    HDL 42 43 39* 32* 38*  --    TRIG 129 170* 189* 481* 246*  --    ALT  --  39  --   --  71* 34   CR  --  0.88 0.87 0.82 0.86 0.99   GFRESTIMATED  --  85 86 >90 88 75   GFRESTBLACK  --  >90 >90 >90 >90 >90  African American GFR Calc     POTASSIUM  --  4.2 4.3 3.8 4.2 4.3              Review of Systems   Constitutional: Positive for chills. Negative for fever.   HENT: Positive for congestion and hearing loss. Negative for ear pain and sore throat.    Eyes: Positive for pain and visual disturbance.   Respiratory: Negative for cough and shortness of breath.    Cardiovascular: Negative for chest pain, palpitations and peripheral edema.   Gastrointestinal: Negative for abdominal pain, constipation, diarrhea, heartburn, hematochezia and nausea.   Genitourinary: Negative for dysuria, frequency, genital sores, hematuria, impotence, penile discharge and urgency.   Musculoskeletal: Positive for arthralgias and myalgias.  "Negative for joint swelling.   Skin: Positive for rash.   Neurological: Positive for dizziness, weakness, headaches and paresthesias.   Psychiatric/Behavioral: Positive for mood changes. The patient is nervous/anxious.      Constitutional, HEENT, cardiovascular, pulmonary, gi and gu systems are negative, except as otherwise noted.    OBJECTIVE:   /68   Pulse 63   Temp 98.1  F (36.7  C) (Oral)   Resp 14   Ht 1.651 m (5' 5\")   Wt 76.7 kg (169 lb)   SpO2 100%   BMI 28.12 kg/m   Estimated body mass index is 27.68 kg/m  as calculated from the following:    Height as of 10/29/21: 1.665 m (5' 5.55\").    Weight as of 10/29/21: 76.7 kg (169 lb 3.2 oz).  Physical Exam  GENERAL: healthy, alert and no distress  EYES: Eyes grossly normal to inspection, PERRL and conjunctivae and sclerae normal  HENT: ear canals and TM's normal, nose and mouth without ulcers or lesions  NECK: no adenopathy, no asymmetry, masses, or scars and thyroid normal to palpation  RESP: lungs clear to auscultation - no rales, rhonchi or wheezes  CV: regular rate and rhythm, normal S1 S2, no S3 or S4, no murmur, click or rub, no peripheral edema and peripheral pulses strong  ABDOMEN: soft, nontender, no hepatosplenomegaly, no masses and bowel sounds normal  MS: no gross musculoskeletal defects noted, no edema  SKIN: no suspicious lesions or rashes  NEURO: Normal strength and tone, mentation intact and speech normal  PSYCH: mentation appears normal, affect normal/bright    Diagnostic Test Results:  Labs reviewed in Epic  Orders Placed This Encounter   Procedures      [employer] Medicare AAA Screening     Pre-clinic laboratory studies were done and reviewed with patient today       ASSESSMENT / PLAN:   (Z00.00) Encounter for Medicare annual wellness exam  (primary encounter diagnosis)  Comment: routine screening issues   Plan:  Aorta Medicare AAA Screening            (Z13.6) Encounter for abdominal aortic aneurysm (AAA) screening  Comment: " "ultrasound of the abdominal aorta   Plan: follow up as indicated on results     (I10) Essential hypertension with goal blood pressure less than 140/90  Comment: controlled within acceptable limits   Plan: hydrochlorothiazide (MICROZIDE) 12.5 MG capsule            (Z90.49) History of partial colectomy  Comment: continue current plan of care    Plan: pantoprazole (PROTONIX) 40 MG EC tablet              Patient has been advised of split billing requirements and indicates understanding: Yes    COUNSELING:  Reviewed preventive health counseling, as reflected in patient instructions    Estimated body mass index is 27.68 kg/m  as calculated from the following:    Height as of 10/29/21: 1.665 m (5' 5.55\").    Weight as of 10/29/21: 76.7 kg (169 lb 3.2 oz).    Weight management plan: Discussed healthy diet and exercise guidelines    He reports that he has never smoked. He has never used smokeless tobacco.      Appropriate preventive services were discussed with this patient, including applicable screening as appropriate for cardiovascular disease, diabetes, osteopenia/osteoporosis, and glaucoma.  As appropriate for age/gender, discussed screening for colorectal cancer, prostate cancer, breast cancer, and cervical cancer. Checklist reviewing preventive services available has been given to the patient.    Reviewed patients plan of care and provided an AVS. The Basic Care Plan (routine screening as documented in Health Maintenance) for Chi meets the Care Plan requirement. This Care Plan has been established and reviewed with the Patient.    Counseling Resources:  ATP IV Guidelines  Pooled Cohorts Equation Calculator  Breast Cancer Risk Calculator  Breast Cancer: Medication to Reduce Risk  FRAX Risk Assessment  ICSI Preventive Guidelines  Dietary Guidelines for Americans, 2010  [employer]'s MyPlate  ASA Prophylaxis  Lung CA Screening    Isaac Knutson MD  [facility]    Identified Health Risks:  "

## 2022-04-13 ENCOUNTER — INFUSION THERAPY VISIT (OUTPATIENT)
Dept: INFUSION THERAPY | Facility: CLINIC | Age: 77
End: 2022-04-13
Attending: INTERNAL MEDICINE
Payer: COMMERCIAL

## 2022-04-13 VITALS
WEIGHT: 168.9 LBS | TEMPERATURE: 99 F | SYSTOLIC BLOOD PRESSURE: 146 MMHG | OXYGEN SATURATION: 100 % | RESPIRATION RATE: 16 BRPM | HEART RATE: 64 BPM | DIASTOLIC BLOOD PRESSURE: 72 MMHG | BODY MASS INDEX: 28.11 KG/M2

## 2022-04-13 DIAGNOSIS — D50.0 IRON DEFICIENCY ANEMIA DUE TO CHRONIC BLOOD LOSS: Primary | ICD-10-CM

## 2022-04-13 PROCEDURE — 99207 PR NO CHARGE LOS: CPT

## 2022-04-13 PROCEDURE — 96365 THER/PROPH/DIAG IV INF INIT: CPT | Performed by: NURSE PRACTITIONER

## 2022-04-13 RX ORDER — HEPARIN SODIUM (PORCINE) LOCK FLUSH IV SOLN 100 UNIT/ML 100 UNIT/ML
5 SOLUTION INTRAVENOUS
Status: CANCELLED | OUTPATIENT
Start: 2022-04-20

## 2022-04-13 RX ORDER — MEPERIDINE HYDROCHLORIDE 25 MG/ML
25 INJECTION INTRAMUSCULAR; INTRAVENOUS; SUBCUTANEOUS EVERY 30 MIN PRN
Status: CANCELLED | OUTPATIENT
Start: 2022-04-20

## 2022-04-13 RX ORDER — DIPHENHYDRAMINE HYDROCHLORIDE 50 MG/ML
50 INJECTION INTRAMUSCULAR; INTRAVENOUS
Status: CANCELLED
Start: 2022-04-20

## 2022-04-13 RX ORDER — ALBUTEROL SULFATE 0.83 MG/ML
2.5 SOLUTION RESPIRATORY (INHALATION)
Status: CANCELLED | OUTPATIENT
Start: 2022-04-20

## 2022-04-13 RX ORDER — EPINEPHRINE 1 MG/ML
0.3 INJECTION, SOLUTION INTRAMUSCULAR; SUBCUTANEOUS EVERY 5 MIN PRN
Status: CANCELLED | OUTPATIENT
Start: 2022-04-20

## 2022-04-13 RX ORDER — NALOXONE HYDROCHLORIDE 0.4 MG/ML
0.2 INJECTION, SOLUTION INTRAMUSCULAR; INTRAVENOUS; SUBCUTANEOUS
Status: CANCELLED | OUTPATIENT
Start: 2022-04-20

## 2022-04-13 RX ORDER — HEPARIN SODIUM,PORCINE 10 UNIT/ML
5 VIAL (ML) INTRAVENOUS
Status: CANCELLED | OUTPATIENT
Start: 2022-04-20

## 2022-04-13 RX ORDER — ALBUTEROL SULFATE 90 UG/1
1-2 AEROSOL, METERED RESPIRATORY (INHALATION)
Status: CANCELLED
Start: 2022-04-20

## 2022-04-13 RX ORDER — METHYLPREDNISOLONE SODIUM SUCCINATE 125 MG/2ML
125 INJECTION, POWDER, LYOPHILIZED, FOR SOLUTION INTRAMUSCULAR; INTRAVENOUS
Status: CANCELLED
Start: 2022-04-20

## 2022-04-13 RX ADMIN — Medication 250 ML: at 16:32

## 2022-04-13 NOTE — PROGRESS NOTES
Infusion Nursing Note:  Chi Newman presents today for Injectafer.    Patient seen by provider today: No   present during visit today: Not Applicable.    Note: Patient has received Injectafer in the past and tolerated well.       Intravenous Access:  Peripheral IV placed.    Treatment Conditions:  Not Applicable.      Post Infusion Assessment:  Patient tolerated infusion without incident.  Patient observed for 30 minutes post Injectafer per protocol.  Site patent and intact, free from redness, edema or discomfort.  No evidence of extravasations.  Access discontinued per protocol.       Discharge Plan:   AVS to patient via MYCHART.  Patient will return 4/20 for next appointment.   Patient discharged in stable condition accompanied by: self.  Departure Mode: Ambulatory.      Bushra Choudhary RN                  PVD vertus membrane tissues floating

## 2022-04-19 ENCOUNTER — LAB (OUTPATIENT)
Dept: LAB | Facility: CLINIC | Age: 77
End: 2022-04-19
Attending: INTERNAL MEDICINE
Payer: COMMERCIAL

## 2022-04-19 DIAGNOSIS — D50.0 IRON DEFICIENCY ANEMIA DUE TO CHRONIC BLOOD LOSS: ICD-10-CM

## 2022-04-19 LAB — SARS-COV-2 RNA RESP QL NAA+PROBE: NEGATIVE

## 2022-04-19 PROCEDURE — U0003 INFECTIOUS AGENT DETECTION BY NUCLEIC ACID (DNA OR RNA); SEVERE ACUTE RESPIRATORY SYNDROME CORONAVIRUS 2 (SARS-COV-2) (CORONAVIRUS DISEASE [COVID-19]), AMPLIFIED PROBE TECHNIQUE, MAKING USE OF HIGH THROUGHPUT TECHNOLOGIES AS DESCRIBED BY CMS-2020-01-R: HCPCS

## 2022-04-19 PROCEDURE — U0005 INFEC AGEN DETEC AMPLI PROBE: HCPCS

## 2022-04-20 ENCOUNTER — INFUSION THERAPY VISIT (OUTPATIENT)
Dept: INFUSION THERAPY | Facility: CLINIC | Age: 77
End: 2022-04-20
Attending: INTERNAL MEDICINE
Payer: COMMERCIAL

## 2022-04-20 VITALS
HEART RATE: 63 BPM | TEMPERATURE: 98.5 F | DIASTOLIC BLOOD PRESSURE: 95 MMHG | WEIGHT: 170 LBS | RESPIRATION RATE: 16 BRPM | BODY MASS INDEX: 28.29 KG/M2 | OXYGEN SATURATION: 99 % | SYSTOLIC BLOOD PRESSURE: 142 MMHG

## 2022-04-20 DIAGNOSIS — D50.0 IRON DEFICIENCY ANEMIA DUE TO CHRONIC BLOOD LOSS: Primary | ICD-10-CM

## 2022-04-20 PROCEDURE — 96365 THER/PROPH/DIAG IV INF INIT: CPT | Performed by: NURSE PRACTITIONER

## 2022-04-20 PROCEDURE — 99207 PR NO CHARGE LOS: CPT

## 2022-04-20 RX ORDER — HEPARIN SODIUM (PORCINE) LOCK FLUSH IV SOLN 100 UNIT/ML 100 UNIT/ML
5 SOLUTION INTRAVENOUS
Status: CANCELLED | OUTPATIENT
Start: 2022-04-20

## 2022-04-20 RX ORDER — ALBUTEROL SULFATE 0.83 MG/ML
2.5 SOLUTION RESPIRATORY (INHALATION)
Status: CANCELLED | OUTPATIENT
Start: 2022-04-20

## 2022-04-20 RX ORDER — NALOXONE HYDROCHLORIDE 0.4 MG/ML
0.2 INJECTION, SOLUTION INTRAMUSCULAR; INTRAVENOUS; SUBCUTANEOUS
Status: CANCELLED | OUTPATIENT
Start: 2022-04-20

## 2022-04-20 RX ORDER — METHYLPREDNISOLONE SODIUM SUCCINATE 125 MG/2ML
125 INJECTION, POWDER, LYOPHILIZED, FOR SOLUTION INTRAMUSCULAR; INTRAVENOUS
Status: CANCELLED
Start: 2022-04-20

## 2022-04-20 RX ORDER — DIPHENHYDRAMINE HYDROCHLORIDE 50 MG/ML
50 INJECTION INTRAMUSCULAR; INTRAVENOUS
Status: CANCELLED
Start: 2022-04-20

## 2022-04-20 RX ORDER — EPINEPHRINE 1 MG/ML
0.3 INJECTION, SOLUTION INTRAMUSCULAR; SUBCUTANEOUS EVERY 5 MIN PRN
Status: CANCELLED | OUTPATIENT
Start: 2022-04-20

## 2022-04-20 RX ORDER — HEPARIN SODIUM,PORCINE 10 UNIT/ML
5 VIAL (ML) INTRAVENOUS
Status: CANCELLED | OUTPATIENT
Start: 2022-04-20

## 2022-04-20 RX ORDER — MEPERIDINE HYDROCHLORIDE 25 MG/ML
25 INJECTION INTRAMUSCULAR; INTRAVENOUS; SUBCUTANEOUS EVERY 30 MIN PRN
Status: CANCELLED | OUTPATIENT
Start: 2022-04-20

## 2022-04-20 RX ORDER — ALBUTEROL SULFATE 90 UG/1
1-2 AEROSOL, METERED RESPIRATORY (INHALATION)
Status: CANCELLED
Start: 2022-04-20

## 2022-04-20 RX ADMIN — Medication 250 ML: at 13:57

## 2022-04-20 ASSESSMENT — PAIN SCALES - GENERAL: PAINLEVEL: NO PAIN (0)

## 2022-04-20 NOTE — PROGRESS NOTES
Infusion Nursing Note:  Chi Newman presents today for Injectafer.    Patient seen by provider today: No   present during visit today: Not Applicable.    Note: Patient reports tolerating Injectafer well in the past. No concerns at this time.      Intravenous Access:  Peripheral IV placed.    Treatment Conditions:  Not Applicable.      Post Infusion Assessment:  Patient tolerated infusion without incident.  Patient observed for 30 minutes post Injectafer per protocol.  Site patent and intact, free from redness, edema or discomfort.  No evidence of extravasations.  Access discontinued per protocol.       Discharge Plan:   AVS to patient via MYCHART.  Patient will return as needed for next appointment.   Patient discharged in stable condition accompanied by: self.  Departure Mode: Ambulatory.      Luann Chacon RN

## 2022-04-21 ENCOUNTER — ANCILLARY PROCEDURE (OUTPATIENT)
Dept: ULTRASOUND IMAGING | Facility: CLINIC | Age: 77
End: 2022-04-21
Attending: INTERNAL MEDICINE
Payer: COMMERCIAL

## 2022-04-21 DIAGNOSIS — Z13.6 ENCOUNTER FOR ABDOMINAL AORTIC ANEURYSM (AAA) SCREENING: ICD-10-CM

## 2022-04-21 PROCEDURE — 76706 US ABDL AORTA SCREEN AAA: CPT | Performed by: RADIOLOGY

## 2022-05-05 ENCOUNTER — OFFICE VISIT (OUTPATIENT)
Dept: OPHTHALMOLOGY | Facility: CLINIC | Age: 77
End: 2022-05-05
Payer: COMMERCIAL

## 2022-05-05 DIAGNOSIS — H04.123 DRY EYE SYNDROME, BILATERAL: ICD-10-CM

## 2022-05-05 DIAGNOSIS — H52.4 MYOPIA OF BOTH EYES WITH REGULAR ASTIGMATISM AND PRESBYOPIA: ICD-10-CM

## 2022-05-05 DIAGNOSIS — H52.13 MYOPIA OF BOTH EYES WITH REGULAR ASTIGMATISM AND PRESBYOPIA: ICD-10-CM

## 2022-05-05 DIAGNOSIS — H43.813 PVD (POSTERIOR VITREOUS DETACHMENT), BOTH EYES: ICD-10-CM

## 2022-05-05 DIAGNOSIS — H25.13 NUCLEAR SCLEROTIC CATARACT OF BOTH EYES: ICD-10-CM

## 2022-05-05 DIAGNOSIS — H35.3131 EARLY DRY STAGE NONEXUDATIVE AGE-RELATED MACULAR DEGENERATION OF BOTH EYES: ICD-10-CM

## 2022-05-05 DIAGNOSIS — Z01.00 EXAMINATION OF EYES AND VISION: Primary | ICD-10-CM

## 2022-05-05 DIAGNOSIS — H52.223 MYOPIA OF BOTH EYES WITH REGULAR ASTIGMATISM AND PRESBYOPIA: ICD-10-CM

## 2022-05-05 PROCEDURE — 92014 COMPRE OPH EXAM EST PT 1/>: CPT | Performed by: STUDENT IN AN ORGANIZED HEALTH CARE EDUCATION/TRAINING PROGRAM

## 2022-05-05 PROCEDURE — 92015 DETERMINE REFRACTIVE STATE: CPT | Performed by: STUDENT IN AN ORGANIZED HEALTH CARE EDUCATION/TRAINING PROGRAM

## 2022-05-05 ASSESSMENT — VISUAL ACUITY
OS_CC+: -2
OD_PH_CC+: -2
OD_PH_CC: 20/30
OS_CC: 20/25
CORRECTION_TYPE: GLASSES
OD_CC: 20/50
METHOD: SNELLEN - LINEAR

## 2022-05-05 ASSESSMENT — REFRACTION_WEARINGRX
OD_SPHERE: -1.50
OD_CYLINDER: +1.00
OS_CYLINDER: SPHERE
OS_SPHERE: -1.50
OS_ADD: +2.75
SPECS_TYPE: PAL
OD_ADD: +2.75
OD_AXIS: 010

## 2022-05-05 ASSESSMENT — TONOMETRY
OS_IOP_MMHG: 16
OD_IOP_MMHG: 16
IOP_METHOD: APPLANATION

## 2022-05-05 ASSESSMENT — SLIT LAMP EXAM - LIDS
COMMENTS: 1+ MEIBOMIAN GLAND DYSFUNCTION
COMMENTS: 1+ MEIBOMIAN GLAND DYSFUNCTION

## 2022-05-05 ASSESSMENT — EXTERNAL EXAM - RIGHT EYE: OD_EXAM: NORMAL

## 2022-05-05 ASSESSMENT — REFRACTION_MANIFEST
OS_CYLINDER: SPHERE
OS_SPHERE: -1.25
OD_ADD: +3.00
OD_CYLINDER: +1.00
OS_ADD: +3.00
OD_SPHERE: -2.00
OD_AXIS: 014

## 2022-05-05 ASSESSMENT — EXTERNAL EXAM - LEFT EYE: OS_EXAM: NORMAL

## 2022-05-05 ASSESSMENT — CONF VISUAL FIELD
OD_NORMAL: 1
OS_NORMAL: 1

## 2022-05-05 ASSESSMENT — CUP TO DISC RATIO
OD_RATIO: 0.5
OS_RATIO: 0.5

## 2022-05-05 NOTE — PATIENT INSTRUCTIONS
"Continue Pataday daily as needed for itchiness     Glasses prescription given      Recommend taking an eye vitamin with AREDS2 on the packaging (like Preservision, iCaps, or generic). Follow dosing directions on the package.     Continue artificial tears up to four times a day as needed (Refresh Optive, Systane Balance, TheraTears, or generic artificial tears are ok. Avoid \"get the red out\" drops). And continue gel drop at bedtime in both eyes as needed.      Connie Martinez MD  [phone]     "

## 2022-05-05 NOTE — LETTER
"    5/5/2022         RE: Chi Newman  [address] [place] [address]  [place] [address]        Dear Colleague,    Thank you for referring your patient, Chi Newman, to the [facility]. Please see a copy of my visit note below.     Current Eye Medications:  Pataday as needed and AREDS2     Subjective:  Here for complete eye exam.   Patient complains of having trouble focusing vision at distance and near.    Vision was very blurry this winter following covid booster shot. Vision has improved since but still unfocused.   Floater in right eye. Started 12/2021.     Objective:  See Ophthalmology Exam.      Assessment:  Chi Newman is a 77 year old male who presents with:   Encounter Diagnoses   Name Primary?     Examination of eyes and vision        Early dry stage nonexudative age-related macular degeneration of both eyes Stable.     Nuclear sclerotic cataract of both eyes      PVD (posterior vitreous detachment), both eyes      Dry eye syndrome, bilateral        Myopia of both eyes with regular astigmatism and presbyopia        Plan:  Continue Pataday daily as needed for itchiness     Glasses prescription given      Recommend taking an eye vitamin with AREDS2 on the packaging (like Preservision, iCaps, or generic). Follow dosing directions on the package.     Continue artificial tears up to four times a day as needed (Refresh Optive, Systane Balance, TheraTears, or generic artificial tears are ok. Avoid \"get the red out\" drops). And continue gel drop at bedtime in both eyes as needed.      Connie Martinez MD  [phone]                Again, thank you for allowing me to participate in the care of your patient.        Sincerely,        Connie Martinez MD    "

## 2022-05-05 NOTE — PROGRESS NOTES
" Current Eye Medications:  Pataday as needed and AREDS2     Subjective:  Here for complete eye exam.   Patient complains of having trouble focusing vision at distance and near.    Vision was very blurry this winter following covid booster shot. Vision has improved since but still unfocused.   Floater in right eye. Started 12/2021.     Objective:  See Ophthalmology Exam.      Assessment:  Chi Newman is a 77 year old male who presents with:   Encounter Diagnoses   Name Primary?     Examination of eyes and vision        Early dry stage nonexudative age-related macular degeneration of both eyes Stable.     Nuclear sclerotic cataract of both eyes      PVD (posterior vitreous detachment), both eyes      Dry eye syndrome, bilateral        Myopia of both eyes with regular astigmatism and presbyopia        Plan:  Continue Pataday daily as needed for itchiness     Glasses prescription given      Recommend taking an eye vitamin with AREDS2 on the packaging (like Preservision, iCaps, or generic). Follow dosing directions on the package.     Continue artificial tears up to four times a day as needed (Refresh Optive, Systane Balance, TheraTears, or generic artificial tears are ok. Avoid \"get the red out\" drops). And continue gel drop at bedtime in both eyes as needed.      Connie Martinez MD  [phone]            "

## 2022-05-07 ENCOUNTER — NURSE TRIAGE (OUTPATIENT)
Dept: NURSING | Facility: CLINIC | Age: 77
End: 2022-05-07
Payer: COMMERCIAL

## 2022-05-08 NOTE — TELEPHONE ENCOUNTER
Nurse Triage SBAR    Is this a 2nd Level Triage? YES, LICENSED PRACTITIONER REVIEW IS REQUIRED    Situation: Patient called to report his positive Covid test from Thursday 5/5 and to discuss his symptoms.   Consent: not needed    Background: Patient developed symptoms on Tuesday May 3rd. Patient describes that his symptoms started worsening Thursday 5/5 into Friday 5/6. Patient took a home test on Thursday 5/5, POSITIVE result.     Assessment:     - Symptoms started: May 3 Tuesday.   - Tested positive 5/5 Thursday with a home test.    - Patient calling to report sinus pain and congestion, congestion in his nose and congestion/tightness in lungs. He states his sinuses are painful above his eyebrows.   - Patient denies SOB, denies any SOB with activity. Denies Chest pain.   - Patient's temperature at the time of call was 98.1F orally.   - Patient reports his cough has subsided a little bit today. Patient reports cough was worse yesterday. Writer noted patient coughing and clearing throat.   - Patient reports aches and pains, occasional dizziness.  - Patient reports brain fog and difficulty sleeping.   - Patient does not have an Oximeter at his home.     Protocol Recommended Disposition: Call PCP now.     Recommendation: Call PCP now. Discussed patient status and positive Covid result. Reviewed patient's age as the primary risk factor. Dr Mosqueda advised that patient go to Urgent Care Clinic tomorrow for assessment, oxygen check, and Chest Xray. Writer called patient back and advised him to go to Urgent Care tomorrow. Strongly encouraged patient to find a ride if able; at this time, patient declines he is unable to find a ride. Assisted patient in finding closest Urgent Care to him. Patient preferred an Urgent Care close to his home and patient selected [facility] in [place]. Advised Patient to call back if any new or worsening symptoms. Patient verbalized understanding and agrees with plan.       Paged to provider    Does the patient meet one of the following criteria for ADS visit consideration? 16+ years old, with an [facility] PCP     TIP  Providers, please consider if this condition is appropriate for management at one of our Acute and Diagnostic Services sites.     If patient is a good candidate, please use dotphrase <dot>triageresponse and select Refer to ADS to document.    Geovanna Manjarrez RN, BSN Nurse Triage Advisor 8:52 PM 5/7/2022    Reason for Disposition    HIGH RISK for severe COVID complications (e.g., weak immune system, age > 64 years, obesity with BMI > 25, pregnant, chronic lung disease or other chronic medical condition)  (Exception: Already seen by PCP and no new or worsening symptoms.)    Additional Information    Negative: SEVERE difficulty breathing (e.g., struggling for each breath, speaks in single words)    Negative: Difficult to awaken or acting confused (e.g., disoriented, slurred speech)     Hard to get to sleep.    Negative: Bluish (or gray) lips or face now    Negative: Shock suspected (e.g., cold/pale/clammy skin, too weak to stand, low BP, rapid pulse)    Negative: Sounds like a life-threatening emergency to the triager    Negative: [1] Diagnosed or suspected COVID-19 AND [2] symptoms lasting 3 or more weeks    Negative: [1] COVID-19 exposure AND [2] no symptoms    Negative: COVID-19 vaccine reaction suspected (e.g., fever, headache, muscle aches) occurring 1 to 3 days after getting vaccine    Negative: COVID-19 vaccine, questions about    Negative: COVID-19 and breastfeeding, questions about    Negative: [1] Lives with someone known to have influenza (flu test positive) AND [2] flu-like symptoms (e.g., cough, runny nose, sore throat, SOB; with or without fever)    Negative: [1] Adult with possible COVID-19 symptoms AND [2] triager concerned about severity of symptoms or other causes    Negative: SEVERE or constant chest pain or pressure  (Exception: Mild central chest pain,  present only when coughing.)    Negative: MODERATE difficulty breathing (e.g., speaks in phrases, SOB even at rest, pulse 100-120)    Negative: [1] Headache AND [2] stiff neck (can't touch chin to chest)    Negative: Oxygen level (e.g., pulse oximetry) 90 percent or lower     Does not have an oxygen monitor.    Negative: Chest pain or pressure    Negative: Patient sounds very sick or weak to the triager    Negative: Fever > 103 F (39.4 C)    Negative: [1] Fever > 101 F (38.3 C) AND [2] age > 60 years    Negative: [1] Fever > 100.0 F (37.8 C) AND [2] bedridden (e.g., nursing home patient, CVA, chronic illness, recovering from surgery)    Negative: MILD difficulty breathing (e.g., minimal/no SOB at rest, SOB with walking, pulse <100)    Protocols used: CORONAVIRUS (COVID-19) DIAGNOSED OR [id number] 1.18.2022    Coronavirus (COVID-19) Notification    Gather patient reported symptoms   Assessment   Current Symptoms at time of phone call, reported by patient: (if no symptoms, document No symptoms] Chest congestion, nasal congestion, cough, brain fogginess, sinus pressure. Aches and pains   Date of Symptom(s) onset (if applicable) 5/2/22     If at time of call, Patients symptoms hare worsened, the Patient should contact 911 or have someone drive them to Emergency Dept promptly:      If Patient calling 911, inform 911 personal that you have tested positive for the Coronavirus (COVID-19).  Place mask on and await 911 to arrive.    If Emergency Dept, If possible, please have another adult drive you to the Emergency Dept but you need to wear mask when in contact with other people.      Monoclonal Antibody Administration    You may be eligible to receive a new treatment with a monoclonal antibody for preventing hospitalization in patients at high risk for complications from COVID-19. This medication is still experimental and available on a limited basis; it is given through an IV and must be given at an infusion center.  "Please note that not all people who are eligible will receive the medication since it is in limited supply.  Is the patient symptomatic and onset of symptoms within the last 7 days?  Yes  Is the patient interested in a visit with a provider to discuss treatment options?: Yes    Writer stated to patient: \"Unfortunately due to high demand there are no appointments within the 7 days from the onset of your symptoms.\" Provided patient with Covid Scheduling number for anti-viral treatments to call back Monday morning to inquire if any appointments have opened up, (as appointments are currently all booked BEYOND 7 days after patient's onset of symptoms).    Review information with Patient    Your result was positive. This means you have COVID-19 (coronavirus).      How can I protect others?    These guidelines are for isolating before returning to work, school or .       If you DO have symptoms:  o Stay home and away from others  - For at least 5 days after your symptoms started, AND   - You are fever free for 24 hours (with no medicine that reduces fever), AND  - Your other symptoms are better.  o Wear a mask for 10 full days any time you are around others.    If you DON'T have symptoms:  o Stay at home and away from others for at least 5 days after your positive test.  o Wear a mask for 10 full days any time you are around others.      Would you like me to review some of that information with you now?  Yes    How can I take care of myself?      Get lots of rest. Drink extra fluids (unless a doctor has told you not to).      Take Tylenol (acetaminophen) for fever or pain. If you have liver or kidney problems, ask your family doctor if it's okay to take Tylenol.     Take either:     650 mg (two 325 mg pills) every 4 to 6 hours, or     1,000 mg (two 500 mg pills) every 8 hours as needed.     Note: Do not take more than 3,000 mg in one day. Acetaminophen is found in many medicines (both prescribed and over-the-counter " medicines). Read all labels to be sure you don't take too much.        Know when to call 911: Emergency warning signs include:    Trouble breathing or shortness of breath    Pain or pressure in the chest that doesn't go away    Feeling confused like you haven't felt before, or not being able to wake up    Bluish-colored lips or face    If you were tested for an upcoming procedure, please contact your provider for next steps.     Geovanna Manjarrez RN    COVID 19 Nurse Triage Plan/Patient Instructions    Please be aware that novel coronavirus (COVID-19) may be circulating in the community. If you develop symptoms such as fever, cough, or SOB or if you have concerns about the presence of another infection including coronavirus (COVID-19), please contact your health care provider or visit [email or web address].     Disposition/Instructions    In-Person Visit with provider recommended. Reference Visit Selection Guide. (Per page to Dr Mosqueda, patient advised to go to [facility] tomorrow).     Thank you for taking steps to prevent the spread of this virus.  o Limit your contact with others.  o Wear a simple mask to cover your cough.  o Wash your hands well and often.    Resources    [facility]: About COVID-19: [email or web address]    CDC: What to Do If You're Sick: www.cdc.gov/coronavirus/2019-ncov/about/steps-when-sick.html    CDC: Ending Home Isolation: www.cdc.gov/coronavirus/2019-ncov/hcp/disposition-in-home-patients.html     CDC: Caring for Someone: www.cdc.gov/coronavirus/2019-ncov/if-you-are-sick/care-for-someone.html     [facility]: Interim Guidance for Hospital Discharge to Home: www.health.state.[place]/diseases/coronavirus/hcp/hospdischarge.pdf    [facility] clinical trials (COVID-19 research studies): [email or web address]     Below are the COVID-19 hotlines at the [place] Department [facility] ([facility]). Interpreters are available.   o For health questions: Call  [phone] or 1-954.619.9014 (7 a.m. to 7 p.m.)  o For questions about schools and childcare: Call 238-836-5378 or 1-103.317.7200 (7 a.m. to 7 p.m.)

## 2022-05-31 ENCOUNTER — VIRTUAL VISIT (OUTPATIENT)
Dept: FAMILY MEDICINE | Facility: CLINIC | Age: 77
End: 2022-05-31
Payer: COMMERCIAL

## 2022-05-31 DIAGNOSIS — U07.1 INFECTION DUE TO 2019 NOVEL CORONAVIRUS: Primary | ICD-10-CM

## 2022-05-31 DIAGNOSIS — J30.2 SEASONAL ALLERGIC RHINITIS, UNSPECIFIED TRIGGER: ICD-10-CM

## 2022-05-31 PROCEDURE — 99442 PR PHYSICIAN TELEPHONE EVALUATION 11-20 MIN: CPT | Mod: CS | Performed by: FAMILY MEDICINE

## 2022-05-31 RX ORDER — OLOPATADINE HYDROCHLORIDE 1 MG/ML
1 SOLUTION/ DROPS OPHTHALMIC 2 TIMES DAILY
COMMUNITY
End: 2024-05-07

## 2022-05-31 NOTE — PROGRESS NOTES
Chi is a 77 year old who is being evaluated via a billable telephone visit.    11:21 AM -start visit  11:43 AM-visit done    What phone number would you like to be contacted at? [phone]  How would you like to obtain your AVS? MyChart    Assessment & Plan     Infection due to 2019 novel coronavirus  Pt is doing much better  Advised rest/ fluids  See PMD if not better  Pt took Tessalon-benzotate perles and codeine cough syrup    Seasonal allergic rhinitis, unspecified trigger  Uses meds    Return in about 2 weeks (around 6/14/2022), or if symptoms worsen or fail to improve, for recheck/ sooner if worse or New symptoms.    Chrissie Schrader MD  [facility]    Cruzito Mireles is a 77 year old who presents for the following health issues     History of Present Illness       Reason for visit:  Covid 19 related (Omicron)    He eats 2-3 servings of fruits and vegetables daily.He consumes 3 sweetened beverage(s) daily.He exercises with enough effort to increase his heart rate 10 to 19 minutes per day.  He exercises with enough effort to increase his heart rate 4 days per week. He is missing 1 dose(s) of medications per week.  He is not taking prescribed medications regularly due to side effects.     Pt had covid   Started  symptoms May 5th  covid test positive  Had runny nose   Cough mild   No fever or chills  Mild dizziness   Mild Fatigue  Pt went to urgent care  Now feels better  Felt foggy a few days ago but getting better  This week he Feels very Good  Repeat covid test 5-23-22 is negative    Review of Systems   Rest of the ROS is Negative except see above and Problem list [stable]    Rest of the ROS is Negative except see above and Problem list [stable]        Objective           Vitals:  No vitals were obtained today due to virtual visit.  No fever   Pt feels better    Physical Exam   healthy, alert and no distress  PSYCH: Alert and oriented times 3; coherent speech, normal   rate and  volume, able to articulate logical thoughts, able   to abstract reason, no tangential thoughts, no hallucinations   or delusions  His affect is normal  RESP: No cough, no audible wheezing, able to talk in full sentences  Remainder of exam unable to be completed due to telephone visits    As above    Phone call duration: as above minutes

## 2022-06-01 DIAGNOSIS — Z91.09 ENVIRONMENTAL ALLERGIES: ICD-10-CM

## 2022-06-01 RX ORDER — FLUTICASONE PROPIONATE 50 MCG
SPRAY, SUSPENSION (ML) NASAL
Qty: 16 G | Refills: 0 | Status: SHIPPED | OUTPATIENT
Start: 2022-06-01 | End: 2022-07-20

## 2022-06-01 NOTE — TELEPHONE ENCOUNTER
"Requested Prescriptions   Signed Prescriptions Disp Refills    fluticasone (FLONASE) 50 MCG/ACT nasal spray 16 g 0     Sig: USE ONE TO TWO SPRAYS IN EACH NOSTRIL EVERY DAY       Nasal Allergy Protocol Passed - 6/1/2022 11:06 AM        Passed - Patient is age 12 or older        Passed - Recent (12 mo) or future (30 days) visit within the authorizing provider's specialty     Patient has had an office visit with the authorizing provider or a provider within the authorizing providers department within the previous 12 mos or has a future within next 30 days. See \"Patient Info\" tab in inbasket, or \"Choose Columns\" in Meds & Orders section of the refill encounter.              Passed - Medication is active on med list           Flora Richardson RN  [facility]     "

## 2022-06-28 DIAGNOSIS — H10.33 ACUTE CONJUNCTIVITIS OF BOTH EYES, UNSPECIFIED ACUTE CONJUNCTIVITIS TYPE: Primary | ICD-10-CM

## 2022-06-28 DIAGNOSIS — H04.123 DRY EYE SYNDROME, BILATERAL: ICD-10-CM

## 2022-06-28 RX ORDER — TOBRAMYCIN AND DEXAMETHASONE 3; 1 MG/ML; MG/ML
1 SUSPENSION/ DROPS OPHTHALMIC 3 TIMES DAILY
Qty: 5 ML | Refills: 0 | OUTPATIENT
Start: 2022-06-28

## 2022-06-28 RX ORDER — TOBRAMYCIN AND DEXAMETHASONE 3; 1 MG/ML; MG/ML
1 SUSPENSION/ DROPS OPHTHALMIC 3 TIMES DAILY PRN
Qty: 5 ML | Refills: 0 | Status: SHIPPED | OUTPATIENT
Start: 2022-06-28 | End: 2022-11-01

## 2022-06-28 NOTE — TELEPHONE ENCOUNTER
Routing refill request to provider for review/approval because:  Drug not on the [facility] refill protocol     Requested Prescriptions   Pending Prescriptions Disp Refills    tobramycin-dexamethasone (TOBRADEX) 0.3-0.1 % ophthalmic suspension 5 mL 0     Sig: Place 1 drop into both eyes 3 times daily        There is no refill protocol information for this order           Jl Segura RN  [facility]

## 2022-07-15 ENCOUNTER — MYC MEDICAL ADVICE (OUTPATIENT)
Dept: FAMILY MEDICINE | Facility: CLINIC | Age: 77
End: 2022-07-15

## 2022-07-18 DIAGNOSIS — Z91.09 ENVIRONMENTAL ALLERGIES: ICD-10-CM

## 2022-07-20 RX ORDER — FLUTICASONE PROPIONATE 50 MCG
SPRAY, SUSPENSION (ML) NASAL
Qty: 16 G | Refills: 0 | Status: SHIPPED | OUTPATIENT
Start: 2022-07-20 | End: 2022-08-25

## 2022-07-20 NOTE — TELEPHONE ENCOUNTER
"Requested Prescriptions   Signed Prescriptions Disp Refills    fluticasone (FLONASE) 50 MCG/ACT nasal spray 16 g 0     Sig: USE ONE TO TWO SPRAYS IN EACH NOSTRIL EVERY DAY       Nasal Allergy Protocol Passed - 7/18/2022  2:36 PM        Passed - Patient is age 12 or older        Passed - Recent (12 mo) or future (30 days) visit within the authorizing provider's specialty     Patient has had an office visit with the authorizing provider or a provider within the authorizing providers department within the previous 12 mos or has a future within next 30 days. See \"Patient Info\" tab in inbasket, or \"Choose Columns\" in Meds & Orders section of the refill encounter.              Passed - Medication is active on med list           Flora Richardson RN  [facility]     "

## 2022-07-27 ENCOUNTER — IMMUNIZATION (OUTPATIENT)
Dept: NURSING | Facility: CLINIC | Age: 77
End: 2022-07-27
Payer: COMMERCIAL

## 2022-07-27 ENCOUNTER — TELEPHONE (OUTPATIENT)
Dept: INTERNAL MEDICINE | Facility: CLINIC | Age: 77
End: 2022-07-27

## 2022-07-27 DIAGNOSIS — H43.813 PVD (POSTERIOR VITREOUS DETACHMENT), BOTH EYES: ICD-10-CM

## 2022-07-27 DIAGNOSIS — H02.886 MEIBOMIAN GLAND DYSFUNCTION (MGD) OF BOTH EYES: ICD-10-CM

## 2022-07-27 DIAGNOSIS — H04.121 DRY EYE OF RIGHT SIDE: Primary | ICD-10-CM

## 2022-07-27 DIAGNOSIS — H43.392 VITREOUS SYNERESIS OF LEFT EYE: ICD-10-CM

## 2022-07-27 DIAGNOSIS — H35.3131 EARLY DRY STAGE NONEXUDATIVE AGE-RELATED MACULAR DEGENERATION OF BOTH EYES: ICD-10-CM

## 2022-07-27 DIAGNOSIS — H02.883 MEIBOMIAN GLAND DYSFUNCTION (MGD) OF BOTH EYES: ICD-10-CM

## 2022-07-27 DIAGNOSIS — H35.363 DRUSEN OF MACULA OF BOTH EYES: ICD-10-CM

## 2022-07-27 DIAGNOSIS — Z23 HIGH PRIORITY FOR 2019-NCOV VACCINE: Primary | ICD-10-CM

## 2022-07-27 DIAGNOSIS — H25.13 SENILE NUCLEAR SCLEROSIS, BILATERAL: ICD-10-CM

## 2022-07-27 PROCEDURE — 0054A COVID-19,PF,PFIZER (12+ YRS): CPT

## 2022-07-27 PROCEDURE — 91305 COVID-19,PF,PFIZER (12+ YRS): CPT

## 2022-07-27 NOTE — TELEPHONE ENCOUNTER
Referral printed and faxed to [facility] (f: [phone]).  LM for patient that this was done.    Viv Coffman,

## 2022-07-27 NOTE — TELEPHONE ENCOUNTER
Reason for Call: Request for an order or referral:    Order or referral being requested: eye specialist.    Date needed: as soon as possible    Has the patient been seen by the PCP for this problem? YES    Additional comments: Patient is requesting a referral to [facility].  Has seen Dr. Martinez here, but eyes are not getting any better.     Phone number Patient can be reached at:  Home number on file [phone] (home)    Best Time:  anytime    Can we leave a detailed message on this number?  YES    Call taken on 7/27/2022 at 12:36 PM by Viv Coffman

## 2022-07-27 NOTE — TELEPHONE ENCOUNTER
Dr. Knutson,    Please see below. Referral cued.     Thanks,  PRINCESS Hines  [facility]

## 2022-07-27 NOTE — PROGRESS NOTES
Prior to immunization administration, verified patients identity using patient s name and date of birth. Please see Immunization Activity for additional information.     Screening Questionnaire for Adult Immunization    Are you sick today?   No   Do you have allergies to medications, food, a vaccine component or latex?   No   Have you ever had a serious reaction after receiving a vaccination?   No   Do you have a long-term health problem with heart, lung, kidney, or metabolic disease (e.g., diabetes), asthma, a blood disorder, no spleen, complement component deficiency, a cochlear implant, or a spinal fluid leak?  Are you on long-term aspirin therapy?   No   Do you have cancer, leukemia, HIV/AIDS, or any other immune system problem?   No   Do you have a parent, brother, or sister with an immune system problem?   No   In the past 3 months, have you taken medications that affect  your immune system, such as prednisone, other steroids, or anticancer drugs; drugs for the treatment of rheumatoid arthritis, Crohn s disease, or psoriasis; or have you had radiation treatments?   No   Have you had a seizure, or a brain or other nervous system problem?   No   During the past year, have you received a transfusion of blood or blood    products, or been given immune (gamma) globulin or antiviral drug?   No   For women: Are you pregnant or is there a chance you could become       pregnant during the next month?   No   Have you received any vaccinations in the past 4 weeks?   No     Immunization questionnaire answers were all negative.        Per orders, injection of Pfizer COVID-19 booster given by Vicky Aleman MA. Patient instructed to remain in clinic for 15 minutes afterwards, and to report any adverse reaction to me immediately.       Screening performed by Vicky Aleman MA on 7/27/2022 at 10:47 AM.  Vicky Aleman MA on 7/27/2022 at 10:47 AM

## 2022-08-10 ENCOUNTER — TRANSFERRED RECORDS (OUTPATIENT)
Dept: INTERNAL MEDICINE | Facility: CLINIC | Age: 77
End: 2022-08-10

## 2022-08-21 ENCOUNTER — MYC MEDICAL ADVICE (OUTPATIENT)
Dept: INTERNAL MEDICINE | Facility: CLINIC | Age: 77
End: 2022-08-21

## 2022-08-21 DIAGNOSIS — K25.9 GASTRIC EROSION, UNSPECIFIED CHRONICITY: Primary | ICD-10-CM

## 2022-08-21 DIAGNOSIS — D50.0 IRON DEFICIENCY ANEMIA DUE TO CHRONIC BLOOD LOSS: ICD-10-CM

## 2022-08-24 DIAGNOSIS — Z91.09 ENVIRONMENTAL ALLERGIES: ICD-10-CM

## 2022-08-25 RX ORDER — FLUTICASONE PROPIONATE 50 MCG
SPRAY, SUSPENSION (ML) NASAL
Qty: 48 G | Refills: 2 | Status: SHIPPED | OUTPATIENT
Start: 2022-08-25 | End: 2023-08-18

## 2022-09-15 ENCOUNTER — TRANSFERRED RECORDS (OUTPATIENT)
Dept: HEALTH INFORMATION MANAGEMENT | Facility: CLINIC | Age: 77
End: 2022-09-15

## 2022-09-20 ENCOUNTER — VIRTUAL VISIT (OUTPATIENT)
Dept: FAMILY MEDICINE | Facility: CLINIC | Age: 77
End: 2022-09-20
Payer: COMMERCIAL

## 2022-09-20 DIAGNOSIS — H92.12 OTORRHEA OF LEFT EAR: Primary | ICD-10-CM

## 2022-09-20 PROCEDURE — 99442 PR PHYSICIAN TELEPHONE EVALUATION 11-20 MIN: CPT | Mod: 95 | Performed by: PHYSICIAN ASSISTANT

## 2022-09-20 NOTE — PROGRESS NOTES
Chi is a 77 year old who is being evaluated via a billable telephone visit.      What phone number would you like to be contacted at? [phone]  How would you like to obtain your AVS? MyChart    Assessment & Plan     Otorrhea of left ear  Due to the nature of a phone call visit, difficult to say what if fully going on.  History seems to lean towards ruptured TM from effusion.  Since it is improving, will continue to watch.  Will forward to PCP if symptoms persist or worsen so they can help get patient in for in person evaluation.               No follow-ups on file.    ROMAN Garcia [facility]   Chi is a 77 year old, presenting for the following health issues:  No chief complaint on file.      HPI     Acute Illness  Acute illness concerns: ringing and drainage left ear  Onset/Duration: 2 weeks  Symptoms:  Fever: No  Chills/Sweats: No  Headache (location?): YES  Sinus Pressure: YES  Conjunctivitis:  No  Ear Pain: YES-   Rhinorrhea: No  Congestion: YES  Sore Throat: YES  Cough: no  Wheeze: No  Decreased Appetite: YES  Nausea: No  Vomiting: No  Diarrhea: No  Dysuria/Freq.: No  Dysuria or Hematuria: No  Fatigue/Achiness: YES  Sick/Strep Exposure: No  Therapies tried and outcome: None    Has been recently on oral doxycycline for several weeks for eye infection.  Stopped last week. Starting last week started to have some left ear pain.      Much improved today  Review of Systems   Constitutional, HEENT, cardiovascular, pulmonary, gi and gu systems are negative, except as otherwise noted.      Objective           Vitals:  No vitals were obtained today due to virtual visit.    Physical Exam   alert and no distress  PSYCH: Alert and oriented times 3; coherent speech, normal   rate and volume, able to articulate logical thoughts, able   to abstract reason, no tangential thoughts, no hallucinations   or delusions  His affect is normal  RESP: No cough, no audible  wheezing, able to talk in full sentences  Remainder of exam unable to be completed due to telephone visits                Phone call duration: 14 minutes

## 2022-09-24 ENCOUNTER — MYC MEDICAL ADVICE (OUTPATIENT)
Dept: INTERNAL MEDICINE | Facility: CLINIC | Age: 77
End: 2022-09-24

## 2022-09-27 ENCOUNTER — LAB (OUTPATIENT)
Dept: LAB | Facility: CLINIC | Age: 77
End: 2022-09-27
Payer: COMMERCIAL

## 2022-09-27 DIAGNOSIS — D50.0 IRON DEFICIENCY ANEMIA DUE TO CHRONIC BLOOD LOSS: ICD-10-CM

## 2022-09-27 DIAGNOSIS — K25.9 GASTRIC EROSION, UNSPECIFIED CHRONICITY: ICD-10-CM

## 2022-09-27 LAB
BASOPHILS # BLD AUTO: 0 10E3/UL (ref 0–0.2)
BASOPHILS NFR BLD AUTO: 1 %
EOSINOPHIL # BLD AUTO: 0.2 10E3/UL (ref 0–0.7)
EOSINOPHIL NFR BLD AUTO: 2 %
ERYTHROCYTE [DISTWIDTH] IN BLOOD BY AUTOMATED COUNT: 13.7 % (ref 10–15)
HCT VFR BLD AUTO: 37.8 % (ref 40–53)
HGB BLD-MCNC: 12.5 G/DL (ref 13.3–17.7)
LYMPHOCYTES # BLD AUTO: 1.6 10E3/UL (ref 0.8–5.3)
LYMPHOCYTES NFR BLD AUTO: 19 %
MCH RBC QN AUTO: 27.9 PG (ref 26.5–33)
MCHC RBC AUTO-ENTMCNC: 33.1 G/DL (ref 31.5–36.5)
MCV RBC AUTO: 84 FL (ref 78–100)
MONOCYTES # BLD AUTO: 0.7 10E3/UL (ref 0–1.3)
MONOCYTES NFR BLD AUTO: 9 %
NEUTROPHILS # BLD AUTO: 6 10E3/UL (ref 1.6–8.3)
NEUTROPHILS NFR BLD AUTO: 70 %
PLATELET # BLD AUTO: 324 10E3/UL (ref 150–450)
RBC # BLD AUTO: 4.48 10E6/UL (ref 4.4–5.9)
WBC # BLD AUTO: 8.5 10E3/UL (ref 4–11)

## 2022-09-27 PROCEDURE — 82728 ASSAY OF FERRITIN: CPT

## 2022-09-27 PROCEDURE — 36415 COLL VENOUS BLD VENIPUNCTURE: CPT

## 2022-09-27 PROCEDURE — 85025 COMPLETE CBC W/AUTO DIFF WBC: CPT

## 2022-09-27 PROCEDURE — 83550 IRON BINDING TEST: CPT

## 2022-09-28 LAB
FERRITIN SERPL-MCNC: 12 NG/ML (ref 26–388)
IRON SATN MFR SERPL: 10 % (ref 15–46)
IRON SERPL-MCNC: 33 UG/DL (ref 35–180)
TIBC SERPL-MCNC: 338 UG/DL (ref 240–430)

## 2022-09-30 ENCOUNTER — MYC MEDICAL ADVICE (OUTPATIENT)
Dept: INTERNAL MEDICINE | Facility: CLINIC | Age: 77
End: 2022-09-30

## 2022-09-30 NOTE — TELEPHONE ENCOUNTER
Please advise regarding MyChart message about lab levels and iron infusions.    SAM SlaterN RN  [facility]

## 2022-09-30 NOTE — TELEPHONE ENCOUNTER
I already sent patient a detailed message on [employer] hours back    Please check-in with him and do a status report, see if he has additional questions     Isaac Knutson MD

## 2022-10-03 NOTE — TELEPHONE ENCOUNTER
Left message on answering machine for patient to call back to the nurse at [phone].    Mary Ann Abraham RN  [facility]

## 2022-10-03 NOTE — TELEPHONE ENCOUNTER
Received call from patient. He states there are no questions/concerns. No further action needed at this time.    SAM GarzaN RN  [facility]

## 2022-10-12 ENCOUNTER — OFFICE VISIT (OUTPATIENT)
Dept: FAMILY MEDICINE | Facility: CLINIC | Age: 77
End: 2022-10-12
Payer: COMMERCIAL

## 2022-10-12 VITALS
WEIGHT: 162.38 LBS | DIASTOLIC BLOOD PRESSURE: 82 MMHG | BODY MASS INDEX: 27.02 KG/M2 | SYSTOLIC BLOOD PRESSURE: 130 MMHG | TEMPERATURE: 98.1 F | HEART RATE: 62 BPM

## 2022-10-12 DIAGNOSIS — H69.90 DYSFUNCTION OF EUSTACHIAN TUBE, UNSPECIFIED LATERALITY: ICD-10-CM

## 2022-10-12 DIAGNOSIS — H93.13 TINNITUS, BILATERAL: Primary | ICD-10-CM

## 2022-10-12 DIAGNOSIS — H90.3 ASYMMETRICAL SENSORINEURAL HEARING LOSS: ICD-10-CM

## 2022-10-12 PROCEDURE — 99213 OFFICE O/P EST LOW 20 MIN: CPT | Performed by: FAMILY MEDICINE

## 2022-10-12 NOTE — PATIENT INSTRUCTIONS
Try to taper down and ideally off the headache medication ( likely some rebound headache present )    Stay well hydrated    Low sodium diet    Schedule with ENT     Use flonase regularly

## 2022-10-12 NOTE — PROGRESS NOTES
"Cruzito Mireles is a 77 year old  presenting for the following health issues:  Ear Problem      History of Present Illness       Headaches:   Since the patient's last clinic visit, headaches are: no change  The patient is getting headaches:  Daily  He is able to do normal daily activities when he has a migraine.  The patient is taking the following rescue/relief medications:  Tylenol   Patient states \"The relief is inconsistent\" from the rescue/relief medications.   The patient is taking the following medications to prevent migraines:  Other  In the past 4 weeks, the patient has gone to an Urgent Care or Emergency Room 1 time times due to headaches.    Reason for visit:  Ear congestion and headache  Symptom onset:  3-4 weeks ago  Symptoms include:  Plugged ears, ringing ears, headaches  Symptom intensity:  Moderate  Symptom progression:  Staying the same  Had these symptoms before:  No  What makes it worse:  When I over excercise  What makes it better:  Taking tylenol tablets 500 mg and warm showerHe consumes 1 sweetened beverage(s) daily.He exercises with enough effort to increase his heart rate 10 to 19 minutes per day.  He exercises with enough effort to increase his heart rate 3 or less days per week. He is missing 1 dose(s) of medications per week.  He is not taking prescribed medications regularly due to remembering to take.        Review of Systems   Ear pain/ ringing    Headache    Got bad in September    Both sides, more on left    No fever/ chills    Rough summer     Had covid in May  Not severe    Some hearing loss          Objective    /82 (BP Location: Right arm, Patient Position: Chair, Cuff Size: Adult Regular)   Pulse 62   Temp 98.1  F (36.7  C) (Temporal)   Wt 73.7 kg (162 lb 6 oz)   BMI 27.02 kg/m    Body mass index is 27.02 kg/m .  Physical Exam  Constitutional:       Appearance: He is well-developed and well-nourished.   HENT:      Head: Normocephalic and atraumatic.      " Right Ear: Tympanic membrane, ear canal and external ear normal.      Left Ear: Tympanic membrane, ear canal and external ear normal.      Ears:      Comments: Mild amount of wax only.  No pain when pulling on helix or pushing on tragus bilat.    Neck range of motion fine.  Eyes:      Conjunctiva/sclera: Conjunctivae normal.   Neck:      Vascular: No carotid bruit.   Cardiovascular:      Rate and Rhythm: Normal rate and regular rhythm.      Pulses: Intact distal pulses.      Heart sounds: Normal heart sounds.   Pulmonary:      Effort: Pulmonary effort is normal. No respiratory distress.      Breath sounds: Normal breath sounds.   Musculoskeletal:         General: No edema.   Neurological:      Mental Status: He is alert and oriented to person, place, and time.      Cranial Nerves: No cranial nerve deficit.   Psychiatric:         Mood and Affect: Mood and affect normal.         Speech: Speech normal.         Behavior: Behavior normal.      cranial nerves fine     Radials symmetric    No sinus/ submandib tenderness        ASSESSMENT / PLAN:  (H93.13) Tinnitus, bilateral  (primary encounter diagnosis)  Comment: discussed in detail with patient.  Prudent to see ent.  The audiologist actually advised this a couple years ago.    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    Andrez Montano MD

## 2022-11-01 ENCOUNTER — OFFICE VISIT (OUTPATIENT)
Dept: FAMILY MEDICINE | Facility: CLINIC | Age: 77
End: 2022-11-01
Payer: COMMERCIAL

## 2022-11-01 VITALS
HEART RATE: 62 BPM | TEMPERATURE: 98.3 F | BODY MASS INDEX: 26.56 KG/M2 | DIASTOLIC BLOOD PRESSURE: 70 MMHG | SYSTOLIC BLOOD PRESSURE: 132 MMHG | OXYGEN SATURATION: 100 % | WEIGHT: 159.6 LBS

## 2022-11-01 DIAGNOSIS — E78.5 HYPERLIPIDEMIA WITH TARGET LDL LESS THAN 130: ICD-10-CM

## 2022-11-01 DIAGNOSIS — Z01.818 PREOP GENERAL PHYSICAL EXAM: Primary | ICD-10-CM

## 2022-11-01 DIAGNOSIS — Z86.16 HISTORY OF COVID-19: ICD-10-CM

## 2022-11-01 PROCEDURE — 99214 OFFICE O/P EST MOD 30 MIN: CPT | Performed by: FAMILY MEDICINE

## 2022-11-01 RX ORDER — POLYETHYLENE GLYCOL 3350 17 G/17G
17 POWDER, FOR SOLUTION ORAL PRN
COMMUNITY
Start: 2022-10-21 | End: 2023-08-08

## 2022-11-01 ASSESSMENT — PAIN SCALES - GENERAL: PAINLEVEL: NO PAIN (0)

## 2022-11-01 NOTE — PROGRESS NOTES
[facility]  [address]  [place] [address]  Phone: [phone]  Primary Provider: Isaac Knutson  Pre-op Performing Provider: HOMER SALAZAR      PREOPERATIVE EVALUATION:  Today's date: 11/1/2022    Chi Newman is a 77 year old male who presents for a preoperative evaluation.    Surgical Information:  Surgery/Procedure: KPC/PCL-cataract  Surgery Location: [facility]  Surgeon: Dr. P. Riedel  Surgery Date: November 15, 2022  Time of Surgery: TBD  Where patient plans to recover: At home alone  Fax number for surgical facility: [phone]    Type of Anesthesia Anticipated: to be determined    Assessment & Plan     The proposed surgical procedure is considered LOW risk.    Preop general physical exam  Stable     Hyperlipidemia with target LDL less than 130      History of COVID-19  Stable   Hypertension -stable              Risks and Recommendations:  The patient has the following additional risks and recommendations for perioperative complications:   - No identified additional risk factors other than previously addressed    Medication Instructions:  Patient is to take all scheduled medications on the day of surgery EXCEPT for modifications listed below:   - Diuretics: HOLD on the day of surgery.    RECOMMENDATION:  APPROVAL GIVEN to proceed with proposed procedure, without further diagnostic evaluation.    Review of external notes as documented above   [id number]}    Subjective     HPI related to upcoming procedure: pt is scheduled for above surgery due to vision issues    Preop Questions 10/29/2022   1. Have you ever had a heart attack or stroke? No   2. Have you ever had surgery on your heart or blood vessels, such as a stent placement, a coronary artery bypass, or surgery on an artery in your head, neck, heart, or legs? No   3. Do you have chest pain with activity? No   4. Do you have a history of  heart failure? No   5. Do you currently have a cold, bronchitis  or symptoms of other infection? No   6. Do you have a cough, shortness of breath, or wheezing? No   7. Do you or anyone in your family have previous history of blood clots? No   8. Do you or does anyone in your family have a serious bleeding problem such as prolonged bleeding following surgeries or cuts? No   9. Have you ever had problems with anemia or been told to take iron pills? YES - had  Agastric erosion and has had anemia and had surgery   10. Have you had any abnormal blood loss such as black, tarry or bloody stools? No   11. Have you ever had a blood transfusion? YES - due to GI bleed   11a. Have you ever had a transfusion reaction? NO   12. Are you willing to have a blood transfusion if it is medically needed before, during, or after your surgery? Yes   13. Have you or any of your relatives ever had problems with anesthesia? No   14. Do you have sleep apnea, excessive snoring or daytime drowsiness? No   15. Do you have any artifical heart valves or other implanted medical devices like a pacemaker, defibrillator, or continuous glucose monitor? No   16. Do you have artificial joints? No   17. Are you allergic to latex? No       Health Care Directive:  Patient does not have a Health Care Directive or Living Will: Discussed advance care planning with patient; information given to patient to review.    Preoperative Review of :   reviewed - no record of controlled substances prescribed.      Status of Chronic Conditions:  HYPERTENSION - Patient has longstanding history of HTN , currently denies any symptoms referable to elevated blood pressure. Specifically denies chest pain, palpitations, dyspnea, orthopnea, PND or peripheral edema. Blood pressure readings have been in normal range. Current medication regimen is as listed below. Patient denies any side effects of medication.       Review of Systems  CONSTITUTIONAL: NEGATIVE for fever, chills, change in weight  INTEGUMENTARY/SKIN: NEGATIVE for worrisome  rashes, moles or lesions  EYES: NEGATIVE for vision changes or irritation  ENT/MOUTH: NEGATIVE for ear, mouth and throat problems  RESP: NEGATIVE for significant cough or SOB  CV: NEGATIVE for chest pain, palpitations or peripheral edema  GI: NEGATIVE for nausea, abdominal pain, heartburn, or change in bowel habits  : NEGATIVE for frequency, dysuria, or hematuria  MUSCULOSKELETAL: NEGATIVE for significant arthralgias or myalgia  NEURO: NEGATIVE for weakness, dizziness or paresthesias  ENDOCRINE: NEGATIVE for temperature intolerance, skin/hair changes  HEME: NEGATIVE for bleeding problems  PSYCHIATRIC: NEGATIVE for changes in mood or affect  Has Vision Problems from cataract  Had  Bowel Obstruction a few Months ago and doing better  Patient Active Problem List    Diagnosis Date Noted     Iron deficiency anemia, unspecified 04/27/2020     Priority: Medium     Iron deficiency anemia due to chronic blood loss 04/24/2020     Priority: Medium     Gastric erosion, unspecified chronicity 04/21/2020     Priority: Medium     Meibomian gland dysfunction (MGD) of both eyes 11/14/2017     Priority: Medium     Dry eye of right side 11/14/2017     Priority: Medium     History of diverticulitis of colon 10/31/2017     Priority: Medium     History of partial colectomy 10/31/2017     Priority: Medium     PVD (posterior vitreous detachment), both eyes 12/31/2016     Priority: Medium     Vitreous syneresis of left eye 12/19/2016     Priority: Medium     Slow transit constipation 10/18/2016     Priority: Medium     Nonexudative senile macular degeneration of retina 01/14/2016     Priority: Medium     Senile nuclear sclerosis, bilateral 01/14/2016     Priority: Medium     Drusen of macula of both eyes 12/18/2015     Priority: Medium     Posterior vitreous detachment of right eye 12/18/2015     Priority: Medium     Elevated prostate specific antigen (PSA) 10/14/2014     Priority: Medium     Elevated glucose 09/11/2014     Priority:  Medium     Diverticulitis of colon 04/01/2014     Priority: Medium     See [facility] documentation . Patient was admitted on 3/2/2014 and discharged on 3/31/2014. He had an acute diverticulitis clinical presentation and had severe symptoms  And developed an ileus , then abscess and then a fistula formed. Interventional radiology placed a drain on 3/14 but this did not solve his troubles and he eventually went on to have a small bowel resection, lysis of adhesions and pelvic drain placement  On 3/19. Pathology reports were negative for malignancy or ischemic vascular disease. Also developed rash to vancomycin . Was on Total parenteral nutrition (TPN) for a time.       Chronic nonalcoholic liver disease 03/03/2014     Priority: Medium     Microscopic hematuria 03/03/2014     Priority: Medium     Umbilical hernia 03/03/2014     Priority: Medium     Pneumonia 11/08/2013     Priority: Medium     His of bibasilar pneumonia February 1999       BPH (benign prostatic hyperplasia) 11/08/2013     Priority: Medium     See office visit notes with urology from 2002       Environmental allergies 11/05/2013     Priority: Medium     Seasonal allergic rhinitis 11/05/2013     Priority: Medium     Hyperlipidemia with target LDL less than 130      Priority: Medium     Per his last office visit with previous primary care physician Dr. Steve Rios , it is noted that  1. He's been followed at the [facility]. by a Dr.Chaitanya Mccarty   2. He has a history of myalgias with statins.   3. He was on niacin.   4. They've tried him on some Metamucil for cholesterol .   5. He mentions that he was on Lopid 600 twice a day [ dates ? ].   He had some recent labs including total cholesterol 216, HDL 33, , triglycerides 241.   Hemoglobin was 14.8.   Other labs he has included creatinine 1.0, glucose 91, AST 57, ALT 54, PSA 3.14, TSH 1.48, vitamin D of 14.    Diagnosis updated by automated process. Provider to review and confirm.        Diverticulosis of colon      Priority: Medium     Last colonoscopy with Dr. Liz Garcia, [facility] on 2013, only finding per patient is diverticulosis        Elevated liver enzymes      Priority: Medium     Hypovitaminosis D      Priority: Medium     Vitamin D was quite low at 13. Dr. Steve Rios ordered a prescription for 50,000 units weekly for 8 weeks and then after that he was advised to take over-the-counter vitamin D 2000 units daily, indefinitely. Since then he has not yet had a follow up Vitamin D level checked.         Health Care Home 2013     Priority: Medium     Michele (Magda) Zeferino RN,C--[phone]   [facility] Care Coordinator /      DX V65.8 REPLACED WITH [id number] HEALTH CARE HOME (2013)        Past Medical History:   Diagnosis Date     Arthritis     Mother suffered from Arthritis.     Cancer (H)     Mother  of Cancer.     Depressive disorder     My mother suffered from Depression.     Diverticulosis of colon      Elevated liver enzymes      Hyperlipidemia LDL goal < 130      Hypovitaminosis D      Nonsenile cataract      Past Surgical History:   Procedure Laterality Date     ABDOMEN SURGERY      Diverticulitus     ARTHROSCOPY KNEE  89    right knee, LMT     ARTHROSCOPY KNEE  1988    left knee, MMT     BIOPSY      Colonoscopy / Endoscopy     COLONOSCOPY  10/7/2002    diverticulosis, due again in      COLONOSCOPY  2013     SMALL BOWEL RESECTION  3/19/2014    small bowel resection, lysis of adhesions and pelvic drain placement     TONSILLECTOMY & ADENOIDECTOMY  age of 15     Current Outpatient Medications   Medication Sig Dispense Refill     acetaminophen (TYLENOL) 500 MG tablet Take 500-1,000 mg by mouth every 6 hours as needed       Cobalamin Combinations (B-12) 100-5000 MCG SUBL        ferrous sulfate (FE TABS) 325 (65 Fe) MG EC tablet Take 325 mg by mouth daily       fexofenadine (ALLEGRA) 180 MG  tablet Take 1 tablet (180 mg) by mouth daily Take 180 mg by mouth once daily. Indications: SEASONAL ALLERGIC RHINITIS 90 tablet 3     fluticasone (FLONASE) 50 MCG/ACT nasal spray USE ONE TO TWO SPRAYS IN EACH NOSTRIL EVERY DAY 48 g 2     hydrochlorothiazide (MICROZIDE) 12.5 MG capsule Take 1 capsule (12.5 mg) by mouth daily 90 capsule 3     hypromellose (GENTEAL) ophthalmic gel 0.3% Place 1 drop into both eyes every hour as needed for dry eyes       Multiple Vitamins-Minerals (ICAPS AREDS 2 PO)        olopatadine (PATANOL) 0.1 % ophthalmic solution 1 drop 2 times daily PRN       pantoprazole (PROTONIX) 20 MG EC tablet Take 1 tablet (20 mg) by mouth daily 90 tablet 0     polyethylene glycol (MIRALAX) 17 GM/Dose powder Take 17 g by mouth as needed       Propylene Glycol (SYSTANE BALANCE OP) Apply 1 drop to eye 4 times daily       STATIN NOT PRESCRIBED (INTENTIONAL) Please choose reason not prescribed, below       triamcinolone (KENALOG) 0.1 % external cream Apply topically 2 times daily 453.6 g 0     VITAMIN D3 25 MCG (1000 UT) tablet TAKE THREE TABLETS BY MOUTH ONCE DAILY 300 tablet 1       Allergies   Allergen Reactions     Atorvastatin      Ezetimibe-Simvastatin Muscle Pain (Myalgia)     Vytorin      Vancomycin Rash     Rash/hives     Vancomycin Rash        Social History     Tobacco Use     Smoking status: Never     Smokeless tobacco: Never   Substance Use Topics     Alcohol use: Not Currently     Alcohol/week: 0.0 standard drinks     Comment: Occasional glass of wine and beer.     Family History   Problem Relation Age of Onset     Cancer Mother         Colon Cancer     Colon Cancer Mother          of Colon/Intestinal cancer     Diabetes Brother         My brother  from complications of diabetes     Glaucoma No family hx of      Macular Degeneration No family hx of      History   Drug Use No         Objective     /70   Pulse 62   Temp 98.3  F (36.8  C) (Temporal)   Wt 72.4 kg (159 lb 9.6 oz)    SpO2 100%   BMI 26.56 kg/m      Physical Exam    GENERAL APPEARANCE: healthy, alert and no distress     EYES: EOMI,  PERRL     HENT: ear canals and TM's normal and nose and mouth without ulcers or lesions     NECK: no adenopathy, no asymmetry, masses, or scars and thyroid normal to palpation     RESP: lungs clear to auscultation - no rales, rhonchi or wheezes     CV: regular rates and rhythm, normal S1 S2, no S3 or S4 and no murmur, click or rub     ABDOMEN:  soft, nontender, no HSM or masses and bowel sounds normal     MS: extremities normal- no gross deformities noted, no evidence of inflammation in joints, FROM in all extremities.     SKIN: no suspicious lesions or rashes     NEURO: Normal strength and tone, sensory exam grossly normal, mentation intact and speech normal     PSYCH: mentation appears normal. and affect normal/bright     LYMPHATICS: No cervical adenopathy    Recent Labs   Lab Test 09/27/22  1455 04/04/22  1030 06/01/21  1049 04/05/21  1051 02/02/21  1045 01/14/21  1235   HGB 12.5* 9.5*   < > 11.8*   < > 14.2     --   --  364  --   --    NA  --   --   --   --   --  138   POTASSIUM  --   --   --   --   --  4.2   CR  --   --   --   --   --  0.88   A1C  --   --   --   --   --  5.5    < > = values in this interval not displayed.        Diagnostics:  No labs were ordered during this visit.   No EKG required for low risk surgery (cataract, skin procedure, breast biopsy, etc).    Revised Cardiac Risk Index (RCRI):  The patient has the following serious cardiovascular risks for perioperative complications:   - No serious cardiac risks = 0 points     RCRI Interpretation: 0 points: Class I (very low risk - 0.4% complication rate)           Signed Electronically by: Chrissie Schrader MD  Copy of this evaluation report is provided to requesting physician.

## 2022-12-05 ENCOUNTER — NURSE TRIAGE (OUTPATIENT)
Dept: INTERNAL MEDICINE | Facility: CLINIC | Age: 77
End: 2022-12-05

## 2022-12-05 NOTE — TELEPHONE ENCOUNTER
"Nurse Triage SBAR    Is this a 2nd Level Triage? YES, LICENSED PRACTITIONER REVIEW IS REQUIRED    Situation: Patient is experiencing weakness, increasing blurred vision and headache    Background: States that he came down with flu like symptoms around 11/11/22. Symptoms began with body aches, sore throat, fever and diarrhea. He did not do any visit or testing for influenza. He did do an at home covid-19 test which was negative.He woke up today feeling very weak. States, \"Since I have low iron to begin with, I had labs done this year once already in September. There was some low readings. I am thinking since I am feeling so tired right now, maybe I should have them re-checked.\" Pt stating that he woke up this morning feeling like he can't stand on his feet very well and had a headache. After eating and having some fluids, he feels better. Still weak, but able to walk unassisted. Notified patient that there are standing orders in place for the iron, ferritin, and CBC and patient can schedule with lab at any time he would like to have these tests done. Patient reports that his fluid and food intake is okay. He is trying to push fluids and drinking Gatorade He has no vomiting. The diarrhea has resolved. Pt feeling like he has increased blurred vision as well, but does report having poor eye sight due to cataracts.    Assessment: Writer concerned for potential dehydration due to flu. Patient is concerned with low iron levels/hemoglobin. Writer expressed concern to patient regarding the increased weakness and recommended to be seen today due to his symptoms, but patient not wanting to schedule. There are no appointment openings in clinic today, so this would have to be at ED/UC or ADS or with PCP if willing to add to schedule.  Patient states that he will prefer to wait and see how he feels a little bit later. If still feeling unwell, then he plans to call back to schedule lab appt to have iron tests done.     Protocol " Recommended Disposition:   See in Office Today    Recommendation: Please advise if okay for patient plan to wait/have labs done tomorrow.     Routed to provider    Does the patient meet one of the following criteria for ADS visit consideration? 16+ years old, with an [facility] PCP     TIP  Providers, please consider if this condition is appropriate for management at one of our Acute and Diagnostic Services sites.     If patient is a good candidate, please use dotphrase <dot>triageresponse and select Refer to ADS to document.      Reason for Disposition    MODERATE weakness (i.e., interferes with work, school, normal activities) and persists > 3 days    Additional Information    Negative: SEVERE difficulty breathing (e.g., struggling for each breath, speaks in single words)    Negative: Shock suspected (e.g., cold/pale/clammy skin, too weak to stand, low BP, rapid pulse)    Negative: Difficult to awaken or acting confused (e.g., disoriented, slurred speech)    Negative: Fainted > 15 minutes ago and still feels too weak or dizzy to stand    Negative: SEVERE weakness (i.e., unable to walk or barely able to walk, requires support) and new-onset or worsening    Negative: Sounds like a life-threatening emergency to the triager    Negative: Weakness of the face, arm or leg on one side of the body    Negative: Has diabetes mellitus and weakness from low blood sugar (i.e., < 60 mg/dL or 3.5 mmol/L)    Negative: Recent heat exposure, suspected cause of weakness    Negative: Vomiting is main symptom    Negative: Diarrhea is main symptom    Negative: Difficulty breathing    Negative: Heart beating < 50 beats per minute OR > 140 beats per minute    Negative: Extra heartbeats OR irregular heart beating (i.e., 'palpitations')    Negative: Follows bleeding (e.g., from vomiting, rectum, vagina)  (Exception: Small transient weakness from sight of a small amount blood.)    Negative: Bloody, black, or tarry bowel movements  (Exception:  Chronic-unchanged black-grey bowel movements and is taking iron pills or Pepto-Bismol.)    Negative: MODERATE weakness from poor fluid intake with no improvement after 2 hours of rest and fluids    Negative: Drinking very little and dehydration suspected (e.g., no urine > 12 hours, very dry mouth, very lightheaded)    Negative: Patient sounds very sick or weak to the triager    Negative: MODERATE weakness (i.e., interferes with work, school, normal activities) and cause unknown  (Exceptions: Weakness with acute minor illness, or weakness from poor fluid intake.)    Negative: Fever > 103 F  (39.4 C) and not able to get the Fever down using CARE ADVICE    Negative: Fever > 100.0 F (37.8 C) and bedridden (e.g., nursing home patient, stroke, chronic illness, recovering from surgery)    Negative: Fever > 101 F (38.3 C) and over 60 years of age    Negative: Fever > 100.0 F (37.8 C) and diabetes mellitus or weak immune system (e.g., HIV positive, cancer chemo, splenectomy, organ transplant, chronic steroids)    Negative: Pale skin (pallor)    Protocols used: WEAKNESS (GENERALIZED) AND FATIGUE-A-OH    Marsha Ferrer RN   [facility]

## 2022-12-05 NOTE — TELEPHONE ENCOUNTER
I reviewed the [facility] Team Triage RN notes. I see no need for urgent direct face to face encounter with evaluate of patient, and recommend supportive measures including    Supportive measures reviewed ,    Rest   Push fluids   Acetaminophen or Naproxen [Aleve] / ADVIL (ibuprofen)  prn for fever and aches and pains  Guaifenesin [ mucinex ] can help for sinus congestion   Robitussin DM, 1-2 tsp q 4-6 hours can help with congestion / coughing   Sucrets or other throat lozenges can help for sore throat along with gargling with warm salt water   Gargling with warm saltwater can really help the sore throat symptoms , prn / as often as every hour    Beyond these options, he's encouraged to come in for repeat of his laboratory studies via the standing orders . If he continues to feel poorly over the next week he needs a clinic appointment.    Isaac Knutson MD

## 2022-12-05 NOTE — TELEPHONE ENCOUNTER
Spoke with patient. Relayed provider's message as written. Patient verbalized understanding and has no further questions at this time.    Abe Gonzalez RN  [facility]

## 2022-12-27 ENCOUNTER — TELEPHONE (OUTPATIENT)
Dept: FAMILY MEDICINE | Facility: CLINIC | Age: 77
End: 2022-12-27

## 2022-12-27 NOTE — TELEPHONE ENCOUNTER
Reason for Call:  Other appointment    Detailed comments: patient called and would like an appointment for a Pzifer COVID vaccine mid January.    Only wants to go to [place].    No schedule at this time.    Please contact patient.  Thank you.    Phone Number Patient can be reached at: Cell number on file:    Telephone Information:   Mobile [phone]       Best Time: any    Can we leave a detailed message on this number? YES    Call taken on 12/27/2022 at 2:28 PM by Maty Yan

## 2023-01-06 ENCOUNTER — NURSE TRIAGE (OUTPATIENT)
Dept: FAMILY MEDICINE | Facility: CLINIC | Age: 78
End: 2023-01-06

## 2023-01-06 NOTE — TELEPHONE ENCOUNTER
Called patient. Left detailed voice message on identified voicemail box regarding provider's message, advised to return call at [phone] for any further questions.     Abe Gonzalez RN  [facility]

## 2023-01-06 NOTE — TELEPHONE ENCOUNTER
There's a few different issues / areas of concern here    A] the more smouldering concern about a diarrhea condition that hasn't resolved. This is the kind of thing we would discuss and review ideas for evaluation and management / diagnosis , this would be at office visit     B] the more serious concerns about lightheadedness / signs or symptoms  Of volume depleted status . It is these symptoms that are much more concerning with their acuity. Dependent upon how bad things are, he would need to consider emergency room. If he is significantly weakened and near fainting from standing or if he gets shortness of breath or diaphoresis with standing or other exertion. Dependent upon how he feels if he's just not that bad , he can wait for the appointment on 1/9 but if he does endorse any of these more worrisome symptoms he should go in for a checkup at the emergency room     Isaac Knutson MD

## 2023-01-06 NOTE — TELEPHONE ENCOUNTER
Spoke with patient regarding symptoms. Patient has been having diarrhea for the past week. Patient has not had any bowel movements today. Describes it as moderate diarrhea. Patient is able to still drink fluids, not feeling dehydrated. Patient describes stool as very soft and watery.     Patient had the flu a few weeks ago, and mentions he felt fatigued however feeling better besides diarrhea. Patient suspicious that the flu may have caused the diarrhea. Patient also had diarrhea when he had the flu. This week is the first week after recovering from the flu.     Patient's diarrhea begins with abdominal pain and gassiness. Patient describes his abdominal pain as noticeable, however, not severe. Patient goes to the bathroom every 30 minutes from afternoon to early evening. Patient gets nervous to eat, as he does not know what triggers diarrhea.     Patient has been staying away from spicy food, and foods that he. Patient thinking he may be taking too much fiber. Patient taking less fiber, still continuing through the week. Patient wondering whether his pantoprazole is causing diarrhea.      Patient has been on Doxycycline 50 mg since 8/21 for eye infection. Patient concerned this also may be contributing to the diarrhea.     Patient feels a bit weak now from diarrhea, it has been causing a lot of sleeplessness.     Patient denies vomiting, fever, or blood in stool.     Advised patient should be seen in office today per protocol, no openings at  clinic today. Assisted with appointment at  clinic on 1/9 at 1:30 pm.     Patient requesting to see how symptoms are after trying a bland diet with probiotics, however keep appointment for Monday in case symptoms do not improve. Advised patient should be seen in UC/ED in the meantime if symptoms worsen. Patient verbalized understanding and has no further questions at this time.     Patient requesting message to be sent to PCP for any further recommendations.     Abe  CHRISTIE Gonzalez [facility]    Reason for Disposition    MODERATE diarrhea (e.g., 4-6 times / day more than normal) and present > 48 hours (2 days)    Additional Information    Negative: Shock suspected (e.g., cold/pale/clammy skin, too weak to stand, low BP, rapid pulse)    Negative: Difficult to awaken or acting confused (e.g., disoriented, slurred speech)    Negative: Sounds like a life-threatening emergency to the triager    Negative: Vomiting also present and worse than the diarrhea    Negative: Blood in stool and without diarrhea    Negative: SEVERE abdominal pain (e.g., excruciating) and present > 1 hour    Negative: SEVERE abdominal pain and age > 60 years    Negative: Bloody, black, or tarry bowel movements (Exception: chronic-unchanged black-grey bowel movements and is taking iron pills or Pepto-Bismol)    Negative: SEVERE diarrhea (e.g., 7 or more times / day more than normal) and age > 60 years    Negative: Constant abdominal pain lasting > 2 hours    Negative: Drinking very little and has signs of dehydration (e.g., no urine > 12 hours, very dry mouth, very lightheaded)    Negative: Patient sounds very sick or weak to the triager    Negative: SEVERE diarrhea (e.g., 7 or more times / day more than normal) and present > 24 hours (1 day)    Protocols used: DIARRHEA-A-OH

## 2023-01-09 ENCOUNTER — OFFICE VISIT (OUTPATIENT)
Dept: FAMILY MEDICINE | Facility: CLINIC | Age: 78
End: 2023-01-09
Payer: COMMERCIAL

## 2023-01-09 VITALS
OXYGEN SATURATION: 100 % | BODY MASS INDEX: 26.29 KG/M2 | HEART RATE: 61 BPM | DIASTOLIC BLOOD PRESSURE: 67 MMHG | WEIGHT: 158 LBS | SYSTOLIC BLOOD PRESSURE: 113 MMHG | TEMPERATURE: 98.8 F

## 2023-01-09 DIAGNOSIS — K52.9 CHRONIC DIARRHEA: Primary | ICD-10-CM

## 2023-01-09 DIAGNOSIS — R10.30 LOWER ABDOMINAL PAIN, UNSPECIFIED: ICD-10-CM

## 2023-01-09 DIAGNOSIS — K25.9 GASTRIC EROSION, UNSPECIFIED CHRONICITY: ICD-10-CM

## 2023-01-09 DIAGNOSIS — Z87.19 HX SBO: ICD-10-CM

## 2023-01-09 PROBLEM — N40.0 HYPERTROPHY OF PROSTATE WITHOUT URINARY OBSTRUCTION AND OTHER LOWER URINARY TRACT SYMPTOMS (LUTS): Status: ACTIVE | Noted: 2023-01-09

## 2023-01-09 PROBLEM — K56.609 SBO (SMALL BOWEL OBSTRUCTION) (H): Status: ACTIVE | Noted: 2022-10-18

## 2023-01-09 PROBLEM — E78.00 PURE HYPERCHOLESTEROLEMIA: Status: ACTIVE | Noted: 2023-01-09

## 2023-01-09 PROBLEM — R42 DIZZINESS AND GIDDINESS: Status: ACTIVE | Noted: 2023-01-09

## 2023-01-09 PROBLEM — K92.2 GIB (GASTROINTESTINAL BLEEDING): Status: ACTIVE | Noted: 2020-03-02

## 2023-01-09 PROBLEM — R79.89 ABNORMAL LIVER FUNCTION TESTS: Status: ACTIVE | Noted: 2023-01-09

## 2023-01-09 PROCEDURE — 99214 OFFICE O/P EST MOD 30 MIN: CPT | Performed by: FAMILY MEDICINE

## 2023-01-09 ASSESSMENT — ENCOUNTER SYMPTOMS: DIARRHEA: 1

## 2023-01-09 NOTE — PROGRESS NOTES
"  Assessment & Plan       ICD-10-CM    1. Chronic diarrhea  K52.9 Elastase Fecal     Fecal Lactoferrin     Enteric Bacteria and Virus Panel by CORNELIA Stool     Cryptosporidium Stain     C. difficile Toxin B PCR with reflex to C. difficile Antigen and Toxins A/B EIA     Elastase Fecal     Fecal Lactoferrin     Enteric Bacteria and Virus Panel by CORNELIA Stool     Cryptosporidium Stain     C. difficile Toxin B PCR with reflex to C. difficile Antigen and Toxins A/B EIA      2. Gastric erosion, unspecified chronicity  K25.9       3. Hx SBO  Z87.19       4. Lower abdominal pain, unspecified  R10.30 Enteric Bacteria and Virus Panel by CORNELIA Stool     Enteric Bacteria and Virus Panel by CORNELIA Stool                     BMI:   Estimated body mass index is 26.29 kg/m  as calculated from the following:    Height as of 4/8/22: 1.651 m (5' 5\").    Weight as of this encounter: 71.7 kg (158 lb).           No follow-ups on file.    Chi Russell MD  [facility] MAK Mireles is a 78 year old, presenting for the following health issues:  Diarrhea      Diarrhea    History of Present Illness       Reason for visit:  Feeling bloated, diarrhea, abdominal cramps  Symptom onset:  1-2 weeks ago  Symptoms include:  Feeling gassy, bloated, abdominal pain, diarrhea  Symptom intensity:  Mild  Symptom progression:  Staying the same  Had these symptoms before:  Yes  Has tried/received treatment for these symptoms:  No  What makes it worse:  Eating whole grain food or spicy food.  What makes it better:  Have recently taken probiotic    He eats 2-3 servings of fruits and vegetables daily.He consumes 2 sweetened beverage(s) daily.He exercises with enough effort to increase his heart rate 10 to 19 minutes per day.  He exercises with enough effort to increase his heart rate 4 days per week. He is missing 1 dose(s) of medications per week.  He is not taking prescribed medications regularly due to remembering to take.   "     Diverticulitis 2013  Small bowel resection  Subsequent small bowel ulcer at the resection site  ppi and avoidance of nsaids helped    Doxycycline for eye infection  Finished course in november  Diarrhea 1-2 weeks after finishing  Has had diarrhea since then  Probiotics have helped  Had solid stool today  Lost 10 lb since diarrhea started    Wt Readings from Last 4 Encounters:   01/09/23 71.7 kg (158 lb)   11/01/22 72.4 kg (159 lb 9.6 oz)   10/12/22 73.7 kg (162 lb 6 oz)   04/20/22 77.1 kg (170 lb)               Review of Systems   Gastrointestinal: Positive for diarrhea.            Objective    /67   Pulse 61   Temp 98.8  F (37.1  C) (Oral)   Wt 71.7 kg (158 lb)   SpO2 100%   BMI 26.29 kg/m    Body mass index is 26.29 kg/m .  Physical Exam  Constitutional:       General: He is not in acute distress.     Appearance: Normal appearance. He is well-developed. He is not ill-appearing.   HENT:      Head: Normocephalic and atraumatic.      Right Ear: External ear normal.      Left Ear: External ear normal.      Nose: Nose normal.   Eyes:      General: No scleral icterus.     Extraocular Movements: Extraocular movements intact.      Conjunctiva/sclera: Conjunctivae normal.   Cardiovascular:      Rate and Rhythm: Normal rate.   Pulmonary:      Effort: Pulmonary effort is normal.   Musculoskeletal:      Cervical back: Normal range of motion and neck supple.   Skin:     General: Skin is warm and dry.   Neurological:      Mental Status: He is alert and oriented to person, place, and time.   Psychiatric:         Behavior: Behavior normal.         Thought Content: Thought content normal.         Judgment: Judgment normal.

## 2023-01-10 ENCOUNTER — TELEPHONE (OUTPATIENT)
Dept: FAMILY MEDICINE | Facility: CLINIC | Age: 78
End: 2023-01-10

## 2023-01-10 ENCOUNTER — APPOINTMENT (OUTPATIENT)
Dept: LAB | Facility: CLINIC | Age: 78
End: 2023-01-10
Payer: COMMERCIAL

## 2023-01-10 LAB
C DIFF TOX B STL QL: NEGATIVE
LACTOFERRIN STL QL IA: NEGATIVE

## 2023-01-10 PROCEDURE — 83630 LACTOFERRIN FECAL (QUAL): CPT | Performed by: FAMILY MEDICINE

## 2023-01-10 PROCEDURE — 82653 EL-1 FECAL QUANTITATIVE: CPT | Performed by: FAMILY MEDICINE

## 2023-01-10 PROCEDURE — 87493 C DIFF AMPLIFIED PROBE: CPT | Mod: 59 | Performed by: FAMILY MEDICINE

## 2023-01-10 PROCEDURE — 87506 IADNA-DNA/RNA PROBE TQ 6-11: CPT | Performed by: FAMILY MEDICINE

## 2023-01-10 NOTE — TELEPHONE ENCOUNTER
Reason for call:  Other   Patient called regarding (reason for call): Chi called in.    He saw Dr. Betancourt on 1/9/2023, a bunch of home collection labs were ordered.    He wanted to re-go over the instructions, so he does this correctly.      Huddled with the lab, they asked that the patient get transferred to them at [phone]    Additional comments: Let the patient know of the transfer, he understood.    Phone number to reach patient:  Cell number on file:    Telephone Information:   Mobile [phone]     Best Time:      Can we leave a detailed message on this number?  YES    Travel screening: Not Applicable

## 2023-01-11 LAB
C COLI+JEJUNI+LARI FUSA STL QL NAA+PROBE: NOT DETECTED
EC STX1 GENE STL QL NAA+PROBE: NOT DETECTED
EC STX2 GENE STL QL NAA+PROBE: NOT DETECTED
ELASTASE PANC STL-MCNT: >800 UG/G
NOROV GI+II ORF1-ORF2 JNC STL QL NAA+PR: NOT DETECTED
RVA NSP5 STL QL NAA+PROBE: NOT DETECTED
SALMONELLA SP RPOD STL QL NAA+PROBE: NOT DETECTED
SHIGELLA SP+EIEC IPAH STL QL NAA+PROBE: NOT DETECTED
V CHOL+PARA RFBL+TRKH+TNAA STL QL NAA+PR: NOT DETECTED
Y ENTERO RECN STL QL NAA+PROBE: NOT DETECTED

## 2023-01-12 ENCOUNTER — ALLIED HEALTH/NURSE VISIT (OUTPATIENT)
Dept: FAMILY MEDICINE | Facility: CLINIC | Age: 78
End: 2023-01-12
Payer: COMMERCIAL

## 2023-01-12 ENCOUNTER — MYC MEDICAL ADVICE (OUTPATIENT)
Dept: FAMILY MEDICINE | Facility: CLINIC | Age: 78
End: 2023-01-12

## 2023-01-12 DIAGNOSIS — Z90.49 HISTORY OF PARTIAL COLECTOMY: ICD-10-CM

## 2023-01-12 DIAGNOSIS — Z23 HIGH PRIORITY FOR 2019-NCOV VACCINE: Primary | ICD-10-CM

## 2023-01-12 DIAGNOSIS — Z87.19 HX SBO: ICD-10-CM

## 2023-01-12 DIAGNOSIS — K25.9 GASTRIC EROSION, UNSPECIFIED CHRONICITY: ICD-10-CM

## 2023-01-12 DIAGNOSIS — K52.9 CHRONIC DIARRHEA: Primary | ICD-10-CM

## 2023-01-12 LAB
C CAYETANENSIS SPEC QL: NORMAL
CRYPTOSP STL QL ACID FAST STN: NORMAL
CYSTOISOSPORA BELLI: NORMAL

## 2023-01-12 PROCEDURE — 91312 COVID-19 VACCINE BIVALENT BOOSTER 12+ (PFIZER): CPT

## 2023-01-12 PROCEDURE — 0124A COVID-19 VACCINE BIVALENT BOOSTER 12+ (PFIZER): CPT

## 2023-01-12 PROCEDURE — 99207 PR NO CHARGE LOS: CPT

## 2023-01-26 ENCOUNTER — LAB (OUTPATIENT)
Dept: LAB | Facility: CLINIC | Age: 78
End: 2023-01-26
Payer: COMMERCIAL

## 2023-01-26 DIAGNOSIS — K25.9 GASTRIC EROSION, UNSPECIFIED CHRONICITY: ICD-10-CM

## 2023-01-26 DIAGNOSIS — D50.0 IRON DEFICIENCY ANEMIA DUE TO CHRONIC BLOOD LOSS: ICD-10-CM

## 2023-01-26 LAB
BASOPHILS # BLD AUTO: 0.1 10E3/UL (ref 0–0.2)
BASOPHILS NFR BLD AUTO: 1 %
EOSINOPHIL # BLD AUTO: 0.1 10E3/UL (ref 0–0.7)
EOSINOPHIL NFR BLD AUTO: 2 %
ERYTHROCYTE [DISTWIDTH] IN BLOOD BY AUTOMATED COUNT: 15.2 % (ref 10–15)
HCT VFR BLD AUTO: 37.9 % (ref 40–53)
HGB BLD-MCNC: 12.3 G/DL (ref 13.3–17.7)
LYMPHOCYTES # BLD AUTO: 1.2 10E3/UL (ref 0.8–5.3)
LYMPHOCYTES NFR BLD AUTO: 18 %
MCH RBC QN AUTO: 25.4 PG (ref 26.5–33)
MCHC RBC AUTO-ENTMCNC: 32.5 G/DL (ref 31.5–36.5)
MCV RBC AUTO: 78 FL (ref 78–100)
MONOCYTES # BLD AUTO: 0.6 10E3/UL (ref 0–1.3)
MONOCYTES NFR BLD AUTO: 9 %
NEUTROPHILS # BLD AUTO: 4.8 10E3/UL (ref 1.6–8.3)
NEUTROPHILS NFR BLD AUTO: 71 %
PLATELET # BLD AUTO: 324 10E3/UL (ref 150–450)
RBC # BLD AUTO: 4.84 10E6/UL (ref 4.4–5.9)
WBC # BLD AUTO: 6.8 10E3/UL (ref 4–11)

## 2023-01-26 PROCEDURE — 83550 IRON BINDING TEST: CPT

## 2023-01-26 PROCEDURE — 85025 COMPLETE CBC W/AUTO DIFF WBC: CPT

## 2023-01-26 PROCEDURE — 82728 ASSAY OF FERRITIN: CPT

## 2023-01-26 PROCEDURE — 36415 COLL VENOUS BLD VENIPUNCTURE: CPT

## 2023-01-26 PROCEDURE — 83540 ASSAY OF IRON: CPT

## 2023-01-27 LAB
FERRITIN SERPL-MCNC: 10 NG/ML (ref 26–388)
IRON SATN MFR SERPL: 10 % (ref 15–46)
IRON SERPL-MCNC: 33 UG/DL (ref 35–180)
TIBC SERPL-MCNC: 345 UG/DL (ref 240–430)

## 2023-02-14 ENCOUNTER — OFFICE VISIT (OUTPATIENT)
Dept: AUDIOLOGY | Facility: CLINIC | Age: 78
End: 2023-02-14
Payer: COMMERCIAL

## 2023-02-14 DIAGNOSIS — H90.3 SENSORINEURAL HEARING LOSS, BILATERAL: Primary | ICD-10-CM

## 2023-02-14 PROCEDURE — 92557 COMPREHENSIVE HEARING TEST: CPT

## 2023-02-14 PROCEDURE — 92550 TYMPANOMETRY & REFLEX THRESH: CPT

## 2023-02-14 NOTE — PROGRESS NOTES
AUDIOLOGY REPORT:    Patient was referred to [facility] from ENT by Dr. Nevarez for a hearing examination. Patient reports longstanding bilateral tinnitus, but notes it becoming more bothersome. Chi also reports new onset of bilateral aural fullness, but denies pain and drainage. He was unaccompanied to today's appointment.     Testing:    Otoscopy:   Otoscopic exam indicates ears are clear of cerumen bilaterally     Tympanograms:    RIGHT: normal eardrum mobility     LEFT:   restricted eardrum mobility     Reflexes (reported by stimulus ear): 1000 Hz  RIGHT: Ipsilateral is present at normal levels  RIGHT: Contralateral is absent at frequencies tested  LEFT:   Ipsilateral is absent at frequencies tested  LEFT:   Contralateral is present at normal levels    Thresholds:   Pure Tone Thresholds assessed using conventional audiometry with good  reliability from 250-8000 Hz bilaterally using insert earphones and circumaural headphones     RIGHT:  normal sloping to moderately-severe sensorineural hearing loss    LEFT:    normal sloping to severe sensorineural hearing loss    Speech Reception Threshold:    RIGHT: 25 dB HL    LEFT:   25 dB HL  Speech Reception Thresholds are in good agreement with pure tone thresholds    Word Recognition Score:     RIGHT: 100% at 65 dB HL using NU-6 recorded word list.    LEFT:   88% at 65 dB HL using NU-6 recorded word list.    Discussed results with the patient. When compared to previous results on 8/24/2020, hearing has remained stable in both ears. He is not interested in amplification at this time.     Patient is scheduled to follow-up with ENT on 2/15    Elmo Reid, CCC-A  Licensed Audiologist  [place] #[id number]    02/14/23

## 2023-03-27 ENCOUNTER — TRANSFERRED RECORDS (OUTPATIENT)
Dept: HEALTH INFORMATION MANAGEMENT | Facility: CLINIC | Age: 78
End: 2023-03-27
Payer: COMMERCIAL

## 2023-04-20 ENCOUNTER — PATIENT OUTREACH (OUTPATIENT)
Dept: CARE COORDINATION | Facility: CLINIC | Age: 78
End: 2023-04-20
Payer: COMMERCIAL

## 2023-04-20 DIAGNOSIS — I10 ESSENTIAL HYPERTENSION WITH GOAL BLOOD PRESSURE LESS THAN 140/90: ICD-10-CM

## 2023-04-22 RX ORDER — HYDROCHLOROTHIAZIDE 12.5 MG/1
12.5 CAPSULE ORAL DAILY
Qty: 60 CAPSULE | Refills: 0 | Status: SHIPPED | OUTPATIENT
Start: 2023-04-22 | End: 2023-06-23

## 2023-04-23 ENCOUNTER — HEALTH MAINTENANCE LETTER (OUTPATIENT)
Age: 78
End: 2023-04-23

## 2023-04-25 ENCOUNTER — MYC MEDICAL ADVICE (OUTPATIENT)
Dept: INTERNAL MEDICINE | Facility: CLINIC | Age: 78
End: 2023-04-25
Payer: COMMERCIAL

## 2023-04-27 ENCOUNTER — TRANSFERRED RECORDS (OUTPATIENT)
Dept: HEALTH INFORMATION MANAGEMENT | Facility: CLINIC | Age: 78
End: 2023-04-27
Payer: COMMERCIAL

## 2023-05-04 ENCOUNTER — LAB (OUTPATIENT)
Dept: LAB | Facility: CLINIC | Age: 78
End: 2023-05-04
Payer: COMMERCIAL

## 2023-05-04 ENCOUNTER — MYC MEDICAL ADVICE (OUTPATIENT)
Dept: FAMILY MEDICINE | Facility: CLINIC | Age: 78
End: 2023-05-04

## 2023-05-04 DIAGNOSIS — Z12.5 SPECIAL SCREENING FOR MALIGNANT NEOPLASM OF PROSTATE: Primary | ICD-10-CM

## 2023-05-04 DIAGNOSIS — K25.9 GASTRIC EROSION, UNSPECIFIED CHRONICITY: ICD-10-CM

## 2023-05-04 DIAGNOSIS — D50.0 IRON DEFICIENCY ANEMIA DUE TO CHRONIC BLOOD LOSS: ICD-10-CM

## 2023-05-04 LAB
BASOPHILS # BLD AUTO: 0 10E3/UL (ref 0–0.2)
BASOPHILS NFR BLD AUTO: 1 %
EOSINOPHIL # BLD AUTO: 0.1 10E3/UL (ref 0–0.7)
EOSINOPHIL NFR BLD AUTO: 2 %
ERYTHROCYTE [DISTWIDTH] IN BLOOD BY AUTOMATED COUNT: 15.5 % (ref 10–15)
FERRITIN SERPL-MCNC: 24 NG/ML (ref 31–409)
HCT VFR BLD AUTO: 39.3 % (ref 40–53)
HGB BLD-MCNC: 12.8 G/DL (ref 13.3–17.7)
IRON BINDING CAPACITY (ROCHE): 334 UG/DL (ref 240–430)
IRON SATN MFR SERPL: 16 % (ref 15–46)
IRON SERPL-MCNC: 55 UG/DL (ref 61–157)
LYMPHOCYTES # BLD AUTO: 1.2 10E3/UL (ref 0.8–5.3)
LYMPHOCYTES NFR BLD AUTO: 20 %
MCH RBC QN AUTO: 26.3 PG (ref 26.5–33)
MCHC RBC AUTO-ENTMCNC: 32.6 G/DL (ref 31.5–36.5)
MCV RBC AUTO: 81 FL (ref 78–100)
MONOCYTES # BLD AUTO: 0.5 10E3/UL (ref 0–1.3)
MONOCYTES NFR BLD AUTO: 9 %
NEUTROPHILS # BLD AUTO: 3.9 10E3/UL (ref 1.6–8.3)
NEUTROPHILS NFR BLD AUTO: 68 %
PLATELET # BLD AUTO: 268 10E3/UL (ref 150–450)
RBC # BLD AUTO: 4.87 10E6/UL (ref 4.4–5.9)
WBC # BLD AUTO: 5.8 10E3/UL (ref 4–11)

## 2023-05-04 PROCEDURE — 83540 ASSAY OF IRON: CPT

## 2023-05-04 PROCEDURE — 36415 COLL VENOUS BLD VENIPUNCTURE: CPT

## 2023-05-04 PROCEDURE — 83550 IRON BINDING TEST: CPT

## 2023-05-04 PROCEDURE — 82728 ASSAY OF FERRITIN: CPT

## 2023-05-04 PROCEDURE — 85025 COMPLETE CBC W/AUTO DIFF WBC: CPT

## 2023-05-15 NOTE — TELEPHONE ENCOUNTER
PSA   Date Value Ref Range Status   01/14/2021 7.67 (H) 0 - 4 ug/L Final     Comment:     Assay Method:  Chemiluminescence using Siemens Vista analyzer

## 2023-05-16 ENCOUNTER — OFFICE VISIT (OUTPATIENT)
Dept: OTOLARYNGOLOGY | Facility: CLINIC | Age: 78
End: 2023-05-16
Attending: FAMILY MEDICINE
Payer: COMMERCIAL

## 2023-05-16 VITALS
HEART RATE: 51 BPM | SYSTOLIC BLOOD PRESSURE: 116 MMHG | WEIGHT: 158 LBS | BODY MASS INDEX: 26.29 KG/M2 | DIASTOLIC BLOOD PRESSURE: 73 MMHG

## 2023-05-16 DIAGNOSIS — M62.838 MUSCLE SPASM: ICD-10-CM

## 2023-05-16 DIAGNOSIS — H90.3 ASYMMETRICAL SENSORINEURAL HEARING LOSS: ICD-10-CM

## 2023-05-16 DIAGNOSIS — H69.90 DYSFUNCTION OF EUSTACHIAN TUBE, UNSPECIFIED LATERALITY: ICD-10-CM

## 2023-05-16 DIAGNOSIS — H93.13 TINNITUS, BILATERAL: Primary | ICD-10-CM

## 2023-05-16 PROCEDURE — 99203 OFFICE O/P NEW LOW 30 MIN: CPT | Performed by: OTOLARYNGOLOGY

## 2023-05-16 RX ORDER — CYCLOBENZAPRINE HCL 5 MG
5 TABLET ORAL
Qty: 30 TABLET | Refills: 1 | Status: SHIPPED | OUTPATIENT
Start: 2023-05-16 | End: 2023-07-14

## 2023-05-16 NOTE — PROGRESS NOTES
Chief Complaint - Tinnitus, hearing loss    History of Present Illness - Chi Newman is a 78 year old male who presents to me today with ringing in the ears. Patient had COVID May 2022. He was sick for 2 weeks. He had worsening tinnitus, and also ear plugging. It has been present and noticeable for approximately a year, but it has improved. Plugging is in the morning. He has tried popping, but it didn't help. He has allergies. He takes allegra. Also takes flonase. There is no history of chronic ear disease or ear surgery. He had a serious ear infection left ear and says that is why hearing isn't as good. He grinds at night    Tests personally reviewed today for this visit:   1.) audiogram 2/14/23 showed down-sloping sensorineural hearing loss, worse in the left ear to the moderately severe range. I compared this audio to his 2020 audiogram. He had asymmetry then. Both ears have worsened just slightly. No worsening asymmetry.   2.) type A tymps bilateral 2/14/23    Past Medical History -   Patient Active Problem List   Diagnosis     Health Care Home     Hyperlipidemia with target LDL less than 130     Diverticulosis of colon     Elevated liver enzymes     Hypovitaminosis D     Environmental allergies     Seasonal allergic rhinitis     Pneumonia     BPH (benign prostatic hyperplasia)     Diverticulitis of colon     Chronic nonalcoholic liver disease     Microscopic hematuria     Umbilical hernia     Elevated glucose     Elevated prostate specific antigen (PSA)     [place]us[place] of macula of both eyes     Posterior vitreous detachment of right eye     Nonexudative senile macular degeneration of retina     Senile nuclear sclerosis, bilateral     Slow transit constipation     Vitreous syneresis of left eye     PVD (posterior vitreous detachment), both eyes     History of diverticulitis of colon     History of partial colectomy     Meibomian gland dysfunction (MGD) of both eyes     Dry eye of right side     Gastric erosion,  unspecified chronicity     Iron deficiency anemia due to chronic blood loss     Iron deficiency anemia, unspecified     SBO (small bowel obstruction) (H)     GIB (gastrointestinal bleeding)     Dizziness and giddiness     Abnormal liver function tests     Pure hypercholesterolemia     Hypertrophy of prostate without urinary obstruction and other lower urinary tract symptoms (LUTS)       Current Medications -   Current Outpatient Medications:      acetaminophen (TYLENOL) 500 MG tablet, Take 500-1,000 mg by mouth every 6 hours as needed, Disp: , Rfl:      Cobalamin Combinations (B-12) 100-5000 MCG SUBL, , Disp: , Rfl:      ferrous sulfate (FE TABS) 325 (65 Fe) MG EC tablet, Take 325 mg by mouth daily, Disp: , Rfl:      fexofenadine (ALLEGRA) 180 MG tablet, Take 1 tablet (180 mg) by mouth daily Take 180 mg by mouth once daily. Indications: SEASONAL ALLERGIC RHINITIS, Disp: 90 tablet, Rfl: 3     fluticasone (FLONASE) 50 MCG/ACT nasal spray, USE ONE TO TWO SPRAYS IN EACH NOSTRIL EVERY DAY, Disp: 48 g, Rfl: 2     hydrochlorothiazide (MICROZIDE) 12.5 MG capsule, Take 1 capsule (12.5 mg) by mouth daily No further refills until appointment, Disp: 60 capsule, Rfl: 0     hypromellose (GENTEAL) ophthalmic gel 0.3%, Place 1 drop into both eyes every hour as needed for dry eyes, Disp: , Rfl:      Multiple Vitamins-Minerals (ICAPS AREDS 2 PO), , Disp: , Rfl:      olopatadine (PATANOL) 0.1 % ophthalmic solution, 1 drop 2 times daily PRN, Disp: , Rfl:      pantoprazole (PROTONIX) 20 MG EC tablet, TAKE ONE TABLET BY MOUTH ONCE DAILY, Disp: 90 tablet, Rfl: 2     polyethylene glycol (MIRALAX) 17 GM/Dose powder, Take 17 g by mouth as needed, Disp: , Rfl:      Propylene Glycol (SYSTANE BALANCE OP), Apply 1 drop to eye 4 times daily, Disp: , Rfl:      triamcinolone (KENALOG) 0.1 % external cream, Apply topically 2 times daily, Disp: 453.6 g, Rfl: 0     VITAMIN D3 25 MCG (1000 UT) tablet, TAKE THREE TABLETS BY MOUTH ONCE DAILY, Disp: 300  tablet, Rfl: 1    Allergies -   Allergies   Allergen Reactions     Atorvastatin      Ezetimibe-Simvastatin      Ezetimibe-Simvastatin Muscle Pain (Myalgia)     Vancomycin Rash     Rash/hives     Vancomycin Rash       Social History -   Social History     Socioeconomic History     Marital status: Single     Number of children: 1   Tobacco Use     Smoking status: Never     Smokeless tobacco: Never   Vaping Use     Vaping Use: Never used   Substance and Sexual Activity     Alcohol use: Not Currently     Alcohol/week: 0.0 standard drinks     Comment: Occasional glass of wine and beer.     Drug use: No     Sexual activity: Not Currently     Partners: Female     Birth control/protection: Condom   Other Topics Concern     Parent/sibling w/ CABG, MI or angioplasty before 65F 55M? No       Family History -   Family History   Problem Relation Age of Onset     Cancer Mother         Colon Cancer     Colon Cancer Mother          of Colon/Intestinal cancer     Diabetes Brother         My brother  from complications of diabetes     Glaucoma No family hx of      Macular Degeneration No family hx of        Review of Systems - As per HPI and PMHx, otherwise 10+ system review is negative.    Physical Exam  General - The patient is in no distress. Alert and oriented to person and place, answers questions and cooperates with examination appropriately.   Neurologic - CN II-XII are grossly intact. No focal neurologic deficits.   Voice and Breathing - The patient was breathing comfortably without the use of accessory muscles. There was no wheezing, stridor, or stertor.  The patients voice was clear and strong.  Ears - some wax I cleaned with the handheld. The tympanic membranes are normal in appearance, bony landmarks are intact.  No retraction, perforation, or masses. No fluid or purulence was seen in the external canal or the middle ear. No evidence of infection of the middle ear or external canal, cerumen was normal in  appearance.  Eyes - Extraocular movements intact.  Sclera were not icteric or injected, conjunctiva were pink and moist.  Nose - clean nose, open airway. No polyps or pus. No congestion.    Neck - Soft, non-tender. Palpation of the occipital, submental, submandibular, internal jugular chain, and supraclavicular nodes did not demonstrate any abnormal lymph nodes or masses. No parotid masses. Palpation of the thyroid was soft and smooth, with no nodules or goiter appreciated.  The trachea was mobile and midline.      Assessment and Plan - Chi Newman is a 78 year old male who presents to me today with subjective tinnitus, likely due to asymmetric sensorineural hearing loss. Hearing has been mostly stable for 3 years, asymmetry hasn't grow. He likely has a component of TMJ as he admits to grinding in sleep and ear pressure occurs when he wakes in the morning and goes away as the time passes. Normal ear exam. He can try flexeril for grinding and TMJ and precautions such as soft diet were given. He can try masking for tinnitus. I recommend hearing aid referral and hearing aids.       Sabas Nevarez MD  Otolaryngology  [facility]

## 2023-05-16 NOTE — LETTER
5/16/2023         RE: Chi Newman  [address] [place] [address]  [place] [address]        Dear Colleague,    Thank you for referring your patient, Chi Newman, to the [facility]. Please see a copy of my visit note below.    Chief Complaint - Tinnitus, hearing loss    History of Present Illness - Chi Newman is a 78 year old male who presents to me today with ringing in the ears. Patient had COVID May 2022. He was sick for 2 weeks. He had worsening tinnitus, and also ear plugging. It has been present and noticeable for approximately a year, but it has improved. Plugging is in the morning. He has tried popping, but it didn't help. He has allergies. He takes allegra. Also takes flonase. There is no history of chronic ear disease or ear surgery. He had a serious ear infection left ear and says that is why hearing isn't as good. He grinds at night    Tests personally reviewed today for this visit:   1.) audiogram 2/14/23 showed down-sloping sensorineural hearing loss, worse in the left ear to the moderately severe range. I compared this audio to his 2020 audiogram. He had asymmetry then. Both ears have worsened just slightly. No worsening asymmetry.   2.) type A tymps bilateral 2/14/23    Past Medical History -   Patient Active Problem List   Diagnosis     Health Care Home     Hyperlipidemia with target LDL less than 130     Diverticulosis of colon     Elevated liver enzymes     Hypovitaminosis D     Environmental allergies     Seasonal allergic rhinitis     Pneumonia     BPH (benign prostatic hyperplasia)     Diverticulitis of colon     Chronic nonalcoholic liver disease     Microscopic hematuria     Umbilical hernia     Elevated glucose     Elevated prostate specific antigen (PSA)     Drusen of macula of both eyes     Posterior vitreous detachment of right eye     Nonexudative senile macular degeneration of retina     Senile nuclear sclerosis, bilateral     Slow transit constipation      Vitreous syneresis of left eye     PVD (posterior vitreous detachment), both eyes     History of diverticulitis of colon     History of partial colectomy     Meibomian gland dysfunction (MGD) of both eyes     Dry eye of right side     Gastric erosion, unspecified chronicity     Iron deficiency anemia due to chronic blood loss     Iron deficiency anemia, unspecified     SBO (small bowel obstruction) (H)     GIB (gastrointestinal bleeding)     Dizziness and giddiness     Abnormal liver function tests     Pure hypercholesterolemia     Hypertrophy of prostate without urinary obstruction and other lower urinary tract symptoms (LUTS)       Current Medications -   Current Outpatient Medications:      acetaminophen (TYLENOL) 500 MG tablet, Take 500-1,000 mg by mouth every 6 hours as needed, Disp: , Rfl:      Cobalamin Combinations (B-12) 100-5000 MCG SUBL, , Disp: , Rfl:      ferrous sulfate (FE TABS) 325 (65 Fe) MG EC tablet, Take 325 mg by mouth daily, Disp: , Rfl:      fexofenadine (ALLEGRA) 180 MG tablet, Take 1 tablet (180 mg) by mouth daily Take 180 mg by mouth once daily. Indications: SEASONAL ALLERGIC RHINITIS, Disp: 90 tablet, Rfl: 3     fluticasone (FLONASE) 50 MCG/ACT nasal spray, USE ONE TO TWO SPRAYS IN EACH NOSTRIL EVERY DAY, Disp: 48 g, Rfl: 2     hydrochlorothiazide (MICROZIDE) 12.5 MG capsule, Take 1 capsule (12.5 mg) by mouth daily No further refills until appointment, Disp: 60 capsule, Rfl: 0     hypromellose (GENTEAL) ophthalmic gel 0.3%, Place 1 drop into both eyes every hour as needed for dry eyes, Disp: , Rfl:      Multiple Vitamins-Minerals (ICAPS AREDS 2 PO), , Disp: , Rfl:      olopatadine (PATANOL) 0.1 % ophthalmic solution, 1 drop 2 times daily PRN, Disp: , Rfl:      pantoprazole (PROTONIX) 20 MG EC tablet, TAKE ONE TABLET BY MOUTH ONCE DAILY, Disp: 90 tablet, Rfl: 2     polyethylene glycol (MIRALAX) 17 GM/Dose powder, Take 17 g by mouth as needed, Disp: , Rfl:      Propylene Glycol (SYSTANE  BALANCE OP), Apply 1 drop to eye 4 times daily, Disp: , Rfl:      triamcinolone (KENALOG) 0.1 % external cream, Apply topically 2 times daily, Disp: 453.6 g, Rfl: 0     VITAMIN D3 25 MCG (1000 UT) tablet, TAKE THREE TABLETS BY MOUTH ONCE DAILY, Disp: 300 tablet, Rfl: 1    Allergies -   Allergies   Allergen Reactions     Atorvastatin      Ezetimibe-Simvastatin      Ezetimibe-Simvastatin Muscle Pain (Myalgia)     Vancomycin Rash     Rash/hives     Vancomycin Rash       Social History -   Social History     Socioeconomic History     Marital status: Single     Number of children: 1   Tobacco Use     Smoking status: Never     Smokeless tobacco: Never   Vaping Use     Vaping Use: Never used   Substance and Sexual Activity     Alcohol use: Not Currently     Alcohol/week: 0.0 standard drinks     Comment: Occasional glass of wine and beer.     Drug use: No     Sexual activity: Not Currently     Partners: Female     Birth control/protection: Condom   Other Topics Concern     Parent/sibling w/ CABG, MI or angioplasty before 65F 55M? No       Family History -   Family History   Problem Relation Age of Onset     Cancer Mother         Colon Cancer     Colon Cancer Mother          of Colon/Intestinal cancer     Diabetes Brother         My brother  from complications of diabetes     Glaucoma No family hx of      Macular Degeneration No family hx of        Review of Systems - As per HPI and PMHx, otherwise 10+ system review is negative.    Physical Exam  General - The patient is in no distress. Alert and oriented to person and place, answers questions and cooperates with examination appropriately.   Neurologic - CN II-XII are grossly intact. No focal neurologic deficits.   Voice and Breathing - The patient was breathing comfortably without the use of accessory muscles. There was no wheezing, stridor, or stertor.  The patients voice was clear and strong.  Ears - some wax I cleaned with the handheld. The tympanic membranes are  normal in appearance, bony landmarks are intact.  No retraction, perforation, or masses. No fluid or purulence was seen in the external canal or the middle ear. No evidence of infection of the middle ear or external canal, cerumen was normal in appearance.  Eyes - Extraocular movements intact.  Sclera were not icteric or injected, conjunctiva were pink and moist.  Nose - clean nose, open airway. No polyps or pus. No congestion.    Neck - Soft, non-tender. Palpation of the occipital, submental, submandibular, internal jugular chain, and supraclavicular nodes did not demonstrate any abnormal lymph nodes or masses. No parotid masses. Palpation of the thyroid was soft and smooth, with no nodules or goiter appreciated.  The trachea was mobile and midline.      Assessment and Plan - Chi Newman is a 78 year old male who presents to me today with subjective tinnitus, likely due to asymmetric sensorineural hearing loss. Hearing has been mostly stable for 3 years, asymmetry hasn't grow. He likely has a component of TMJ as he admits to grinding in sleep and ear pressure occurs when he wakes in the morning and goes away as the time passes. Normal ear exam. He can try flexeril for grinding and TMJ and precautions such as soft diet were given. He can try masking for tinnitus. I recommend hearing aid referral and hearing aids.       Sabas Nevarez MD  Otolaryngology  [facility]          Again, thank you for allowing me to participate in the care of your patient.        Sincerely,        Sabas Nevarez MD

## 2023-06-02 ENCOUNTER — HEALTH MAINTENANCE LETTER (OUTPATIENT)
Age: 78
End: 2023-06-02

## 2023-06-15 NOTE — PROGRESS NOTES
"Clinic Care Coordination Contact    Follow Up Progress Note      Assessment: Patient returning clinic care coordinator's call.     Patient states he is feeling much better after he had the iron infusions.    Patient states he is reading Dr. Espinoza's book about virus'. He is very concerned about covid-19. He talks at length and in great detail that he feels he had the virus after his second iron infusion. He states the Set up was different and he felt he could have been exposed. He did not have fever, or cough, or shortness of breath or loss of taste or smell.  He did have headache, vertigo and fatigue. He states his symptoms lasted 6-7 days and then he was back to normal.  He is convinced her had covid-19.     Patient went to the [facility] in [place] and had free testing done. This came back negative. He has discussed this with PCP and it has been recommended he have serology testing in August.  He is still not sure if he will do this.     Patient talks at length about \"people are starting to get crazy and nuts.\" he worries about people not wearing masks, not following social distancing, etc. He is quite consumed by the pandemic.     Goals addressed this encounter:   Goals Addressed                 This Visit's Progress      Medical (pt-stated)   50%     Goal Statement: I want to feel \"like my old self again\"     Date Goal set: 4/27/2020    Barriers: Iron deficiency    Strengths: Motivated    Date to Achieve By: October 2020    Patient expressed understanding of goal: Yes    Action steps to achieve this goal:  1. I will have iron infusions as scheduled  2. I will eat a healthy diet  3. I will get adequate sleep  4. I will stay home, social distance, wear a mask and perform good handwashing to decrease exposure to covid-19 pandemic         Intervention/Education provided during outreach: Active listening     Outreach Frequency: monthly    Plan:   Patient will have hemoglobin checks as scheduled.     Care " Coordinator will follow up in one month.    Martha Lang RN, [facility] RN Coordinator  [facility]   6/16/2020    3:02 PM  [phone]   Quality 431: Preventive Care And Screening: Unhealthy Alcohol Use - Screening: Patient not identified as an unhealthy alcohol user when screened for unhealthy alcohol use using a systematic screening method Quality 226: Preventive Care And Screening: Tobacco Use: Screening And Cessation Intervention: Patient screened for tobacco use and is an ex/non-smoker Quality 130: Documentation Of Current Medications In The Medical Record: Current Medications Documented Detail Level: Detailed

## 2023-06-20 DIAGNOSIS — I10 ESSENTIAL HYPERTENSION WITH GOAL BLOOD PRESSURE LESS THAN 140/90: ICD-10-CM

## 2023-06-23 ENCOUNTER — OFFICE VISIT (OUTPATIENT)
Dept: FAMILY MEDICINE | Facility: CLINIC | Age: 78
End: 2023-06-23
Payer: COMMERCIAL

## 2023-06-23 VITALS
OXYGEN SATURATION: 100 % | TEMPERATURE: 98.1 F | DIASTOLIC BLOOD PRESSURE: 74 MMHG | SYSTOLIC BLOOD PRESSURE: 144 MMHG | BODY MASS INDEX: 26.06 KG/M2 | HEART RATE: 57 BPM | WEIGHT: 156.6 LBS

## 2023-06-23 DIAGNOSIS — Z01.818 PRE-OP EVALUATION: Primary | ICD-10-CM

## 2023-06-23 DIAGNOSIS — H26.9 CATARACT, UNSPECIFIED CATARACT TYPE, UNSPECIFIED LATERALITY: ICD-10-CM

## 2023-06-23 DIAGNOSIS — I10 ESSENTIAL HYPERTENSION WITH GOAL BLOOD PRESSURE LESS THAN 140/90: ICD-10-CM

## 2023-06-23 DIAGNOSIS — Z01.818 PREOP GENERAL PHYSICAL EXAM: ICD-10-CM

## 2023-06-23 PROBLEM — K92.1 MELENA: Status: ACTIVE | Noted: 2017-12-13

## 2023-06-23 PROBLEM — K64.9 HEMORRHOIDS: Status: ACTIVE | Noted: 2018-01-23

## 2023-06-23 PROBLEM — D12.2 BENIGN NEOPLASM OF ASCENDING COLON: Status: ACTIVE | Noted: 2023-05-01

## 2023-06-23 PROBLEM — R63.4 UNINTENTIONAL WEIGHT LOSS: Status: ACTIVE | Noted: 2023-03-27

## 2023-06-23 PROBLEM — K92.1 BLACK STOOL: Status: ACTIVE | Noted: 2017-12-13

## 2023-06-23 PROBLEM — R93.3 ABNORMAL CT SCAN, COLON: Status: ACTIVE | Noted: 2017-12-21

## 2023-06-23 PROBLEM — K21.00 GASTRO-ESOPHAGEAL REFLUX DISEASE WITH ESOPHAGITIS: Status: ACTIVE | Noted: 2018-04-24

## 2023-06-23 PROBLEM — K59.09 CHRONIC CONSTIPATION: Status: ACTIVE | Noted: 2023-06-23

## 2023-06-23 PROBLEM — K31.9 DISORDER OF STOMACH: Status: ACTIVE | Noted: 2018-04-26

## 2023-06-23 PROBLEM — Z86.0100 HISTORY OF COLONIC POLYPS: Status: ACTIVE | Noted: 2023-04-27

## 2023-06-23 PROBLEM — R93.3 IMAGING OF GASTROINTESTINAL TRACT ABNORMAL: Status: ACTIVE | Noted: 2017-12-28

## 2023-06-23 PROBLEM — K44.9 DIAPHRAGMATIC HERNIA: Status: ACTIVE | Noted: 2018-04-24

## 2023-06-23 PROBLEM — Z90.49 HISTORY OF RESECTION OF SMALL BOWEL: Status: ACTIVE | Noted: 2023-06-23

## 2023-06-23 PROCEDURE — 99214 OFFICE O/P EST MOD 30 MIN: CPT | Performed by: FAMILY MEDICINE

## 2023-06-23 RX ORDER — HYDROCHLOROTHIAZIDE 12.5 MG/1
12.5 CAPSULE ORAL DAILY
Qty: 90 CAPSULE | Refills: 1 | Status: SHIPPED | OUTPATIENT
Start: 2023-06-23 | End: 2023-07-14

## 2023-06-23 RX ORDER — HYDROCHLOROTHIAZIDE 12.5 MG/1
CAPSULE ORAL
Qty: 60 CAPSULE | Refills: 0 | OUTPATIENT
Start: 2023-06-23

## 2023-06-23 NOTE — PROGRESS NOTES
[facility]  [address]  [place] [address]  Phone: [phone]  Primary Provider: OSEAS GILES  Pre-op Performing Provider: OSEAS GILES      PREOPERATIVE EVALUATION:  Today's date: 6/23/2023    Oseas Newman is a 78 year old male who presents for a preoperative evaluation.       No data to display              Surgical Information:  Surgery/Procedure: Cataract  Surgery Location: [facility]  Surgeon: Dr. Riedel  Surgery Date: 7/18/2023 & 8/1/2023  Time of Surgery: TBD  Where patient plans to recover: At home alone  Fax number for surgical facility: [phone]    Assessment & Plan     The proposed surgical procedure is considered LOW risk.      ICD-10-CM    1. Pre-op evaluation  Z01.818       2. Cataract, unspecified cataract type, unspecified laterality  H26.9       3. Essential hypertension with goal blood pressure less than 140/90  I10 hydrochlorothiazide (MICROZIDE) 12.5 MG capsule      4. Preop general physical exam  Z01.818                      Risks and Recommendations:  The patient has the following additional risks and recommendations for perioperative complications:   - No identified additional risk factors other than previously addressed    Antiplatelet or Anticoagulation Medication Instructions:   - Patient is on no antiplatelet or anticoagulation medications.   - Bleeding risk is low for this procedure (e.g. dental, skin, cataract).    Additional Medication Instructions:  Patient is to take all scheduled medications on the day of surgery    RECOMMENDATION:  APPROVAL GIVEN to proceed with proposed procedure, without further diagnostic evaluation.    Review of external notes as documented elsewhere in note        Subjective       HPI related to upcoming procedure: Patient presents for pre-op evaluation in anticipation for cataract surgery      BP Readings from Last 6 Encounters:   06/23/23 (!) 144/74   05/16/23  116/73   01/09/23 113/67   11/01/22 132/70   10/12/22 130/82   04/20/22 (!) 142/95           6/20/2023     7:43 PM   Preop Questions   1. Have you ever had a heart attack or stroke? No   2. Have you ever had surgery on your heart or blood vessels, such as a stent placement, a coronary artery bypass, or surgery on an artery in your head, neck, heart, or legs? No   3. Do you have chest pain with activity? No   4. Do you have a history of  heart failure? No   5. Do you currently have a cold, bronchitis or symptoms of other infection? No   6. Do you have a cough, shortness of breath, or wheezing? No   7. Do you or anyone in your family have previous history of blood clots? No   8. Do you or does anyone in your family have a serious bleeding problem such as prolonged bleeding following surgeries or cuts? No   9. Have you ever had problems with anemia or been told to take iron pills? YES -    10. Have you had any abnormal blood loss such as black, tarry or bloody stools? No   11. Have you ever had a blood transfusion? No   12. Are you willing to have a blood transfusion if it is medically needed before, during, or after your surgery? Yes   13. Have you or any of your relatives ever had problems with anesthesia? No   14. Do you have sleep apnea, excessive snoring or daytime drowsiness? UNKNOWN -    15. Do you have any artifical heart valves or other implanted medical devices like a pacemaker, defibrillator, or continuous glucose monitor? No   16. Do you have artificial joints? No   17. Are you allergic to latex? No       Health Care Directive:  Patient does not have a Health Care Directive or Living Will:     Preoperative Review of :   reviewed - no record of controlled substances prescribed.      Status of Chronic Conditions:  See problem list for active medical problems.  Problems all longstanding and stable, except as noted/documented.  See ROS for pertinent symptoms related to these conditions.      Review of  Systems  Constitutional, neuro, ENT, endocrine, pulmonary, cardiac, gastrointestinal, genitourinary, musculoskeletal, integument and psychiatric systems are negative, except as otherwise noted.    Patient Active Problem List    Diagnosis Date Noted     History of resection of small bowel 06/23/2023     Priority: Medium     Chronic constipation 06/23/2023     Priority: Medium     Benign neoplasm of ascending colon 05/01/2023     Priority: Medium     History of colonic polyps 04/27/2023     Priority: Medium     Unintentional weight loss 03/27/2023     Priority: Medium     Dizziness and giddiness 01/09/2023     Priority: Medium     Abnormal liver function tests 01/09/2023     Priority: Medium     Pure hypercholesterolemia 01/09/2023     Priority: Medium     Hypertrophy of prostate without urinary obstruction and other lower urinary tract symptoms (LUTS) 01/09/2023     Priority: Medium     SBO (small bowel obstruction) (H) 10/18/2022     Priority: Medium     Iron deficiency anemia, unspecified 04/27/2020     Priority: Medium     Iron deficiency anemia due to chronic blood loss 04/24/2020     Priority: Medium     Gastric erosion, unspecified chronicity 04/21/2020     Priority: Medium     GIB (gastrointestinal bleeding) 03/02/2020     Priority: Medium     Disorder of stomach 04/26/2018     Priority: Medium     Diaphragmatic hernia 04/24/2018     Priority: Medium     Gastro-esophageal reflux disease with esophagitis 04/24/2018     Priority: Medium     Hemorrhoids 01/23/2018     Priority: Medium     Imaging of gastrointestinal tract abnormal 12/28/2017     Priority: Medium     Abnormal CT scan, colon 12/21/2017     Priority: Medium     Black stool 12/13/2017     Priority: Medium     Melena 12/13/2017     Priority: Medium     Meibomian gland dysfunction (MGD) of both eyes 11/14/2017     Priority: Medium     Dry eye of right side 11/14/2017     Priority: Medium     History of diverticulitis of colon 10/31/2017     Priority:  Medium     History of partial colectomy 10/31/2017     Priority: Medium     PVD (posterior vitreous detachment), both eyes 12/31/2016     Priority: Medium     Vitreous syneresis of left eye 12/19/2016     Priority: Medium     Slow transit constipation 10/18/2016     Priority: Medium     Nonexudative senile macular degeneration of retina 01/14/2016     Priority: Medium     Senile nuclear sclerosis, bilateral 01/14/2016     Priority: Medium     Drusen of macula of both eyes 12/18/2015     Priority: Medium     Posterior vitreous detachment of right eye 12/18/2015     Priority: Medium     Elevated prostate specific antigen (PSA) 10/14/2014     Priority: Medium     Elevated glucose 09/11/2014     Priority: Medium     Diverticulitis of colon 04/01/2014     Priority: Medium     See [facility] documentation . Patient was admitted on 3/2/2014 and discharged on 3/31/2014. He had an acute diverticulitis clinical presentation and had severe symptoms  And developed an ileus , then abscess and then a fistula formed. Interventional radiology placed a drain on 3/14 but this did not solve his troubles and he eventually went on to have a small bowel resection, lysis of adhesions and pelvic drain placement  On 3/19. Pathology reports were negative for malignancy or ischemic vascular disease. Also developed rash to vancomycin . Was on Total parenteral nutrition (TPN) for a time.       Chronic nonalcoholic liver disease 03/03/2014     Priority: Medium     Microscopic hematuria 03/03/2014     Priority: Medium     Umbilical hernia 03/03/2014     Priority: Medium     Pneumonia 11/08/2013     Priority: Medium     His of bibasilar pneumonia February 1999       BPH (benign prostatic hyperplasia) 11/08/2013     Priority: Medium     See office visit notes with urology from 2002       Environmental allergies 11/05/2013     Priority: Medium     Seasonal allergic rhinitis 11/05/2013     Priority: Medium     Hyperlipidemia with target LDL less  than 130      Priority: Medium     Per his last office visit with previous primary care physician Dr. Steve Rios , it is noted that  1. He's been followed at the [facility]. by a Dr.Chaitanya Mccarty   2. He has a history of myalgias with statins.   3. He was on niacin.   4. They've tried him on some Metamucil for cholesterol .   5. He mentions that he was on Lopid 600 twice a day [ dates ? ].   He had some recent labs including total cholesterol 216, HDL 33, , triglycerides 241.   Hemoglobin was 14.8.   Other labs he has included creatinine 1.0, glucose 91, AST 57, ALT 54, PSA 3.14, TSH 1.48, vitamin D of 14.    Diagnosis updated by automated process. Provider to review and confirm.       Diverticulosis of colon      Priority: Medium     Last colonoscopy with Dr. Liz Garcia, [facility] on 2013, only finding per patient is diverticulosis        Elevated liver enzymes      Priority: Medium     Hypovitaminosis D      Priority: Medium     Vitamin D was quite low at 13. Dr. Steve Rios ordered a prescription for 50,000 units weekly for 8 weeks and then after that he was advised to take over-the-counter vitamin D 2000 units daily, indefinitely. Since then he has not yet had a follow up Vitamin D level checked.         Health Care Home 2013     Priority: Medium     Michele (Magda) Zeferino RN,C--[phone]   A / [facility] Care Coordinator /      DX V65.8 REPLACED WITH [address] [facility] HOME (2013)        Past Medical History:   Diagnosis Date     Arthritis     Mother suffered from Arthritis.     Cancer (H)     Mother  of Cancer.     Depressive disorder     My mother suffered from Depression.     Diverticulosis of colon      Elevated liver enzymes      Hyperlipidemia LDL goal < 130      Hypovitaminosis D      Nonsenile cataract      Past Surgical History:   Procedure Laterality Date     ABDOMEN SURGERY      Diverticulitus      ARTHROSCOPY KNEE  7/12/96    right knee, LMT     ARTHROSCOPY KNEE  11/1988    left knee, MMT     BIOPSY      Colonoscopy / Endoscopy     COLONOSCOPY  10/7/2002    diverticulosis, due again in 2012     COLONOSCOPY  7/1/2013     SMALL BOWEL RESECTION  3/19/2014    small bowel resection, lysis of adhesions and pelvic drain placement     TONSILLECTOMY & ADENOIDECTOMY  age of 15     Current Outpatient Medications   Medication Sig Dispense Refill     acetaminophen (TYLENOL) 500 MG tablet Take 500-1,000 mg by mouth every 6 hours as needed       Cobalamin Combinations (B-12) 100-5000 MCG SUBL        cyclobenzaprine (FLEXERIL) 5 MG tablet Take 1 tablet (5 mg) by mouth nightly as needed for muscle spasms 30 tablet 1     ferrous sulfate (FE TABS) 325 (65 Fe) MG EC tablet Take 325 mg by mouth daily       fexofenadine (ALLEGRA) 180 MG tablet Take 1 tablet (180 mg) by mouth daily Take 180 mg by mouth once daily. Indications: SEASONAL ALLERGIC RHINITIS 90 tablet 3     fluticasone (FLONASE) 50 MCG/ACT nasal spray USE ONE TO TWO SPRAYS IN EACH NOSTRIL EVERY DAY 48 g 2     hydrochlorothiazide (MICROZIDE) 12.5 MG capsule Take 1 capsule (12.5 mg) by mouth daily 90 capsule 1     hypromellose (GENTEAL) ophthalmic gel 0.3% Place 1 drop into both eyes every hour as needed for dry eyes       Multiple Vitamins-Minerals (ICAPS AREDS 2 PO)        olopatadine (PATANOL) 0.1 % ophthalmic solution 1 drop 2 times daily PRN       pantoprazole (PROTONIX) 20 MG EC tablet TAKE ONE TABLET BY MOUTH ONCE DAILY 90 tablet 2     polyethylene glycol (MIRALAX) 17 GM/Dose powder Take 17 g by mouth as needed       Propylene Glycol (SYSTANE BALANCE OP) Apply 1 drop to eye 4 times daily       triamcinolone (KENALOG) 0.1 % external cream Apply topically 2 times daily 453.6 g 0     VITAMIN D3 25 MCG (1000 UT) tablet TAKE THREE TABLETS BY MOUTH ONCE DAILY 300 tablet 1       Allergies   Allergen Reactions     Atorvastatin      Ezetimibe-Simvastatin       Ezetimibe-Simvastatin Muscle Pain (Myalgia)     Vancomycin Rash     Rash/hives     Vancomycin Rash        Social History     Tobacco Use     Smoking status: Never     Smokeless tobacco: Never   Substance Use Topics     Alcohol use: Not Currently     Comment: Occasional glass of wine and beer.       History   Drug Use Unknown         Objective     BP (!) 144/74   Pulse 57   Temp 98.1  F (36.7  C) (Oral)   Wt 71 kg (156 lb 9.6 oz)   SpO2 100%   BMI 26.06 kg/m      Physical Exam  Vitals reviewed.   Constitutional:       Appearance: Normal appearance. He is not ill-appearing.   HENT:      Head: Normocephalic.      Right Ear: Tympanic membrane and ear canal normal.      Left Ear: Tympanic membrane and ear canal normal.      Nose: Nose normal.   Eyes:      Extraocular Movements: Extraocular movements intact.   Cardiovascular:      Rate and Rhythm: Normal rate and regular rhythm.      Heart sounds: Normal heart sounds. No murmur heard.  Pulmonary:      Effort: Pulmonary effort is normal. No respiratory distress.      Breath sounds: Normal breath sounds. No wheezing or rales.   Abdominal:      Palpations: Abdomen is soft.   Musculoskeletal:         General: Normal range of motion.      Cervical back: Normal range of motion and neck supple.   Skin:     General: Skin is warm and dry.      Findings: No lesion.   Neurological:      Mental Status: He is alert and oriented to person, place, and time.   Psychiatric:         Mood and Affect: Mood normal.         Behavior: Behavior normal.         Thought Content: Thought content normal.         Judgment: Judgment normal.           Recent Labs   Lab Test 05/04/23  1145 01/26/23  1346   HGB 12.8* 12.3*    324        Diagnostics:  No labs were ordered during this visit.   No EKG required for low risk surgery (cataract, skin procedure, breast biopsy, etc).    Revised Cardiac Risk Index (RCRI):  The patient has the following serious cardiovascular risks for perioperative  complications:   - No serious cardiac risks = 0 points     RCRI Interpretation: 0 points: Class I (very low risk - 0.4% complication rate)           Signed Electronically by: Chi Russell MD  Copy of this evaluation report is provided to requesting physician.

## 2023-06-23 NOTE — PATIENT INSTRUCTIONS
For informational purposes only. Not to replace the advice of your health care provider. Copyright   2003,  [facility] [facility]. All rights reserved. Clinically reviewed by Supriya Turner MD. [employer] [id number] - REV .  Preparing for Your Surgery  Getting started  A nurse will call you to review your health history and instructions. They will give you an arrival time based on your scheduled surgery time. Please be ready to share:    Your doctor's clinic name and phone number    Your medical, surgical, and anesthesia history    A list of allergies and sensitivities    A list of medicines, including herbal treatments and over-the-counter drugs    Whether the patient has a legal guardian (ask how to send us the papers in advance)  Please tell us if you're pregnant--or if there's any chance you might be pregnant. Some surgeries may injure a fetus (unborn baby), so they require a pregnancy test. Surgeries that are safe for a fetus don't always need a test, and you can choose whether to have one.   If you have a child who's having surgery, please ask for a copy of Preparing for Your Child's Surgery.    Preparing for surgery    Within 10 to 30 days of surgery: Have a pre-op exam (sometimes called an H&P, or History and Physical). This can be done at a clinic or pre-operative center.  ? If you're having a , you may not need this exam. Talk to your care team.    At your pre-op exam, talk to your care team about all medicines you take. If you need to stop any medicines before surgery, ask when to start taking them again.  ? We do this for your safety. Many medicines can make you bleed too much during surgery. Some change how well surgery (anesthesia) drugs work.    Call your insurance company to let them know you're having surgery. (If you don't have insurance, call [phone].)    Call your clinic if there's any change in your health. This includes signs of a cold or flu (sore throat, runny nose,  cough, rash, fever). It also includes a scrape or scratch near the surgery site.    If you have questions on the day of surgery, call your hospital or surgery center.  Eating and drinking guidelines  For your safety: Unless your surgeon tells you otherwise, follow the guidelines below.    Eat and drink as usual until 8 hours before you arrive for surgery. After that, no food or milk.    Drink clear liquids until 2 hours before you arrive. These are liquids you can see through, like water, Gatorade, and Propel Water. They also include plain black coffee and tea (no cream or milk), candy, and breath mints. You can spit out gum when you arrive.    If you drink alcohol: Stop drinking it the night before surgery.    If your care team tells you to take medicine on the morning of surgery, it's okay to take it with a sip of water.  Preventing infection    Shower or bathe the night before and morning of your surgery. Follow the instructions your clinic gave you. (If no instructions, use regular soap.)    Don't shave or clip hair near your surgery site. We'll remove the hair if needed.    Don't smoke or vape the morning of surgery. You may chew nicotine gum up to 2 hours before surgery. A nicotine patch is okay.  ? Note: Some surgeries require you to completely quit smoking and nicotine. Check with your surgeon.    Your care team will make every effort to keep you safe from infection. We will:  ? Clean our hands often with soap and water (or an alcohol-based hand rub).  ? Clean the skin at your surgery site with a special soap that kills germs.  ? Give you a special gown to keep you warm. (Cold raises the risk of infection.)  ? Wear special hair covers, masks, gowns and gloves during surgery.  ? Give antibiotic medicine, if prescribed. Not all surgeries need antibiotics.  What to bring on the day of surgery    Photo ID and insurance card    Copy of your health care directive, if you have one    Glasses and hearing aids (bring  cases)  ? You can't wear contacts during surgery    Inhaler and eye drops, if you use them (tell us about these when you arrive)    CPAP machine or breathing device, if you use them    A few personal items, if spending the night    If you have . . .  ? A pacemaker, ICD (cardiac defibrillator) or other implant: Bring the ID card.  ? An implanted stimulator: Bring the remote control.  ? A legal guardian: Bring a copy of the certified (court-stamped) guardianship papers.  Please remove any jewelry, including body piercings. Leave jewelry and other valuables at home.  If you're going home the day of surgery    You must have a responsible adult drive you home. They should stay with you overnight as well.    If you don't have someone to stay with you, and you aren't safe to go home alone, we may keep you overnight. Insurance often won't pay for this.  After surgery  If it's hard to control your pain or you need more pain medicine, please call your surgeon's office.  Questions?   If you have any questions for your care team, list them here: _________________________________________________________________________________________________________________________________________________________________________ ____________________________________ ____________________________________ ____________________________________

## 2023-06-26 ENCOUNTER — TELEPHONE (OUTPATIENT)
Dept: FAMILY MEDICINE | Facility: CLINIC | Age: 78
End: 2023-06-26
Payer: COMMERCIAL

## 2023-06-26 NOTE — PROGRESS NOTES
Patient calling to ask if this was faxed on the 23rd or today?     Please call patient back and follow up.     Denisse Ferguson -    [facility]

## 2023-06-26 NOTE — TELEPHONE ENCOUNTER
Patient Returning Call    Reason for call:  Patient calling to ask if pre-op was faxed on the 23rd or today?     Please call patient back and follow up.     Denisse Ferguson -    [facility]    Information relayed to patient: Pre- op completed but unsure when it was faxed.     Patient has additional questions:  No      Could we send this information to you in [employer] or would you prefer to receive a phone call?:   Patient would prefer a phone call   Okay to leave a detailed message?: Yes at Cell number on file:    Telephone Information:   Mobile [phone]

## 2023-06-27 NOTE — TELEPHONE ENCOUNTER
He can call [employer] at [phone]    Jl Segura RN  [facility]

## 2023-06-27 NOTE — TELEPHONE ENCOUNTER
Called patient let him know pre-op was faxed .    He would like a call on how to proceed by getting a New Health Care Directive.  Gricelda Zhang   [facility]

## 2023-07-11 ASSESSMENT — ENCOUNTER SYMPTOMS
CHILLS: 0
ARTHRALGIAS: 1
SORE THROAT: 0
FEVER: 0
COUGH: 0
NERVOUS/ANXIOUS: 0
PARESTHESIAS: 0
DIZZINESS: 0
PALPITATIONS: 0
JOINT SWELLING: 0
DYSURIA: 0
HEMATOCHEZIA: 0
CONSTIPATION: 0
FREQUENCY: 0
NAUSEA: 0
EYE PAIN: 0
HEADACHES: 1
HEARTBURN: 0
HEMATURIA: 0
ABDOMINAL PAIN: 0
SHORTNESS OF BREATH: 0
MYALGIAS: 1
WEAKNESS: 1
DIARRHEA: 0

## 2023-07-11 ASSESSMENT — ACTIVITIES OF DAILY LIVING (ADL): CURRENT_FUNCTION: NO ASSISTANCE NEEDED

## 2023-07-14 ENCOUNTER — OFFICE VISIT (OUTPATIENT)
Dept: INTERNAL MEDICINE | Facility: CLINIC | Age: 78
End: 2023-07-14
Payer: COMMERCIAL

## 2023-07-14 VITALS
HEART RATE: 60 BPM | WEIGHT: 156 LBS | SYSTOLIC BLOOD PRESSURE: 137 MMHG | HEIGHT: 65 IN | BODY MASS INDEX: 25.99 KG/M2 | RESPIRATION RATE: 18 BRPM | OXYGEN SATURATION: 100 % | DIASTOLIC BLOOD PRESSURE: 72 MMHG | TEMPERATURE: 99 F

## 2023-07-14 DIAGNOSIS — L25.9 CONTACT DERMATITIS AND ECZEMA: ICD-10-CM

## 2023-07-14 DIAGNOSIS — R97.20 ELEVATED PROSTATE SPECIFIC ANTIGEN (PSA): ICD-10-CM

## 2023-07-14 DIAGNOSIS — D50.0 IRON DEFICIENCY ANEMIA DUE TO CHRONIC BLOOD LOSS: ICD-10-CM

## 2023-07-14 DIAGNOSIS — E78.5 HYPERLIPIDEMIA LDL GOAL <130: ICD-10-CM

## 2023-07-14 DIAGNOSIS — R73.09 ELEVATED GLUCOSE: ICD-10-CM

## 2023-07-14 DIAGNOSIS — E55.9 HYPOVITAMINOSIS D: ICD-10-CM

## 2023-07-14 DIAGNOSIS — Z00.00 ENCOUNTER FOR SUBSEQUENT ANNUAL WELLNESS VISIT (AWV) IN MEDICARE PATIENT: Primary | ICD-10-CM

## 2023-07-14 DIAGNOSIS — Z12.5 SCREENING PSA (PROSTATE SPECIFIC ANTIGEN): ICD-10-CM

## 2023-07-14 DIAGNOSIS — I10 ESSENTIAL HYPERTENSION WITH GOAL BLOOD PRESSURE LESS THAN 140/90: ICD-10-CM

## 2023-07-14 DIAGNOSIS — Z90.49 HISTORY OF PARTIAL COLECTOMY: ICD-10-CM

## 2023-07-14 LAB
ANION GAP SERPL CALCULATED.3IONS-SCNC: 13 MMOL/L (ref 7–15)
BUN SERPL-MCNC: 12.2 MG/DL (ref 8–23)
CALCIUM SERPL-MCNC: 9.5 MG/DL (ref 8.8–10.2)
CHLORIDE SERPL-SCNC: 98 MMOL/L (ref 98–107)
CHOLEST SERPL-MCNC: 177 MG/DL
CREAT SERPL-MCNC: 0.91 MG/DL (ref 0.67–1.17)
DEPRECATED HCO3 PLAS-SCNC: 25 MMOL/L (ref 22–29)
GFR SERPL CREATININE-BSD FRML MDRD: 87 ML/MIN/1.73M2
GLUCOSE SERPL-MCNC: 92 MG/DL (ref 70–99)
HBA1C MFR BLD: 5.7 % (ref 0–5.6)
HDLC SERPL-MCNC: 34 MG/DL
HGB BLD-MCNC: 13 G/DL (ref 13.3–17.7)
LDLC SERPL CALC-MCNC: 98 MG/DL
NONHDLC SERPL-MCNC: 143 MG/DL
POTASSIUM SERPL-SCNC: 4.5 MMOL/L (ref 3.4–5.3)
PSA SERPL DL<=0.01 NG/ML-MCNC: 10.2 NG/ML (ref 0–6.5)
SODIUM SERPL-SCNC: 136 MMOL/L (ref 136–145)
TRIGL SERPL-MCNC: 224 MG/DL

## 2023-07-14 PROCEDURE — 82306 VITAMIN D 25 HYDROXY: CPT | Performed by: INTERNAL MEDICINE

## 2023-07-14 PROCEDURE — 80061 LIPID PANEL: CPT | Performed by: INTERNAL MEDICINE

## 2023-07-14 PROCEDURE — G0103 PSA SCREENING: HCPCS | Performed by: INTERNAL MEDICINE

## 2023-07-14 PROCEDURE — 83036 HEMOGLOBIN GLYCOSYLATED A1C: CPT | Performed by: INTERNAL MEDICINE

## 2023-07-14 PROCEDURE — 80048 BASIC METABOLIC PNL TOTAL CA: CPT | Performed by: INTERNAL MEDICINE

## 2023-07-14 PROCEDURE — G0439 PPPS, SUBSEQ VISIT: HCPCS | Performed by: INTERNAL MEDICINE

## 2023-07-14 PROCEDURE — 36415 COLL VENOUS BLD VENIPUNCTURE: CPT | Performed by: INTERNAL MEDICINE

## 2023-07-14 PROCEDURE — 85018 HEMOGLOBIN: CPT | Performed by: INTERNAL MEDICINE

## 2023-07-14 PROCEDURE — 99213 OFFICE O/P EST LOW 20 MIN: CPT | Mod: 25 | Performed by: INTERNAL MEDICINE

## 2023-07-14 RX ORDER — PANTOPRAZOLE SODIUM 20 MG/1
20 TABLET, DELAYED RELEASE ORAL DAILY
Qty: 90 TABLET | Refills: 3 | Status: SHIPPED | OUTPATIENT
Start: 2023-07-14 | End: 2024-08-27

## 2023-07-14 RX ORDER — TRIAMCINOLONE ACETONIDE 1 MG/G
CREAM TOPICAL 2 TIMES DAILY
Qty: 453.6 G | Refills: 0 | Status: SHIPPED | OUTPATIENT
Start: 2023-07-14

## 2023-07-14 RX ORDER — HYDROCHLOROTHIAZIDE 12.5 MG/1
12.5 CAPSULE ORAL DAILY
Qty: 90 CAPSULE | Refills: 3 | Status: SHIPPED | OUTPATIENT
Start: 2023-07-14 | End: 2024-05-07

## 2023-07-14 ASSESSMENT — ENCOUNTER SYMPTOMS
SORE THROAT: 0
NERVOUS/ANXIOUS: 0
EYE PAIN: 0
COUGH: 0
ARTHRALGIAS: 1
HEMATOCHEZIA: 0
MYALGIAS: 1
WEAKNESS: 1
HEARTBURN: 0
SHORTNESS OF BREATH: 0
JOINT SWELLING: 0
CONSTIPATION: 0
FREQUENCY: 0
DYSURIA: 0
DIARRHEA: 0
NAUSEA: 0
CHILLS: 0
DIZZINESS: 0
PARESTHESIAS: 0
PALPITATIONS: 0
FEVER: 0
ABDOMINAL PAIN: 0
HEMATURIA: 0
HEADACHES: 1

## 2023-07-14 ASSESSMENT — ACTIVITIES OF DAILY LIVING (ADL): CURRENT_FUNCTION: NO ASSISTANCE NEEDED

## 2023-07-14 NOTE — LETTER
July 17, 2023    Chi Newman  [address] [place] [address]  [place] [address]          Dear ,    We are writing to inform you of your test results.  These labs show us the following things ;     1. Fasting lipid panel numbers are fine   2. The prostate specific antigen test is a little bit higher then before. Technically this is nonspecific but at this point I have to recommend you schedule an appointment with our urologist Dr. Gaytan because this number is inching upwards. I have already place some orders for this and you would need to just call [facility] number at [phone] to get an appointment set up.   3. Hemoglobin a1c  [ diabetes test ] is fine and not in the diabetic range.   4. Hemoglobin ar 13 is better then before and overall, nearly normal now.   5. Vitamin D is within normal limits   6. basic metabolic panel is all within normal limits     Please let me know if you have any questions for me about all of these lab tests       Resulted Orders   Lipid panel reflex to direct LDL Non-fasting   Result Value Ref Range    Cholesterol 177 <200 mg/dL    Triglycerides 224 (H) <150 mg/dL    Direct Measure HDL 34 (L) >=40 mg/dL    LDL Cholesterol Calculated 98 <=100 mg/dL    Non HDL Cholesterol 143 (H) <130 mg/dL    Narrative    Cholesterol  Desirable:  <200 mg/dL    Triglycerides  Normal:  Less than 150 mg/dL  Borderline High:  150-199 mg/dL  High:  200-499 mg/dL  Very High:  Greater than or equal to 500 mg/dL    Direct Measure HDL  Female:  Greater than or equal to 50 mg/dL   Male:  Greater than or equal to 40 mg/dL    LDL Cholesterol  Desirable:  <100mg/dL  Above Desirable:  100-129 mg/dL   Borderline High:  130-159 mg/dL   High:  160-189 mg/dL   Very High:  >= 190 mg/dL    Non HDL Cholesterol  Desirable:  130 mg/dL  Above Desirable:  130-159 mg/dL  Borderline High:  160-189 mg/dL  High:  190-219 mg/dL  Very High:  Greater than or equal to 220 mg/dL   PSA, screen   Result Value Ref  Range    Prostate Specific Antigen Screen 10.20 (H) 0.00 - 6.50 ng/mL    Narrative    This result is obtained using the Roche Elecsys total PSA method on the jatinder e801 immunoassay analyzer. Results obtained with different assay methods or kits cannot be used interchangeably.   Vitamin D Deficiency   Result Value Ref Range    Vitamin D, Total (25-Hydroxy) 33 20 - 75 ug/L    Narrative    Season, race, dietary intake, and treatment affect the concentration of 25-hydroxy-Vitamin D. Values may decrease during winter months and increase during summer months. Values 20-29 ug/L may indicate Vitamin D insufficiency and values <20 ug/L may indicate Vitamin D deficiency.    Vitamin D determination is routinely performed by an immunoassay specific for 25 hydroxyvitamin D3.  If an individual is on vitamin D2(ergocalciferol) supplementation, please specify 25 OH vitamin D2 and D3 level determination by LCMSMS test VITD23.     Hemoglobin   Result Value Ref Range    Hemoglobin 13.0 (L) 13.3 - 17.7 g/dL   Basic metabolic panel  (Ca, Cl, CO2, Creat, Gluc, K, Na, BUN)   Result Value Ref Range    Sodium 136 136 - 145 mmol/L    Potassium 4.5 3.4 - 5.3 mmol/L    Chloride 98 98 - 107 mmol/L    Carbon Dioxide (CO2) 25 22 - 29 mmol/L    Anion Gap 13 7 - 15 mmol/L    Urea Nitrogen 12.2 8.0 - 23.0 mg/dL    Creatinine 0.91 0.67 - 1.17 mg/dL    Calcium 9.5 8.8 - 10.2 mg/dL    Glucose 92 70 - 99 mg/dL    GFR Estimate 87 >60 mL/min/1.73m2   Hemoglobin A1c   Result Value Ref Range    Hemoglobin A1C 5.7 (H) 0.0 - 5.6 %      Comment:      Normal <5.7%   Prediabetes 5.7-6.4%    Diabetes 6.5% or higher     Note: Adopted from ADA consensus guidelines.       If you have any questions or concerns, please call the clinic at the number listed above.       Sincerely,      Isaac Knutson MD

## 2023-07-14 NOTE — PROGRESS NOTES
"SUBJECTIVE:   Chi is a 78 year old who presents for Preventive Visit.      6/23/2023    10:08 AM   Additional Questions   Roomed by Chaya   Accompanied by none     Are you in the first 12 months of your Medicare coverage?  No    Healthy Habits:     In general, how would you rate your overall health?  Good    Frequency of exercise:  4-5 days/week    Duration of exercise:  15-30 minutes    Do you usually eat at least 4 servings of fruit and vegetables a day, include whole grains    & fiber and avoid regularly eating high fat or \"junk\" foods?  Yes    Taking medications regularly:  Yes    Medication side effects:  None    Ability to successfully perform activities of daily living:  No assistance needed    Home Safety:  No safety concerns identified    Hearing Impairment:  Need to ask people to speak up or repeat themselves and difficulty understanding soft or whispered speech    In the past 6 months, have you been bothered by leaking of urine?  No    In general, how would you rate your overall mental or emotional health?  Good    Additional concerns today:  Yes    Wt Readings from Last 5 Encounters:   07/14/23 70.8 kg (156 lb)   06/23/23 71 kg (156 lb 9.6 oz)   05/16/23 71.7 kg (158 lb)   01/09/23 71.7 kg (158 lb)   11/01/22 72.4 kg (159 lb 9.6 oz)     Body mass index is 25.96 kg/m .    Patient had a spell with increased frequency of urination and some other benign prostatic hyperplasia symptoms , but these symptoms have since resolved.    Have you ever done Advance Care Planning? (For example, a Health Directive, POLST, or a discussion with a medical provider or your loved ones about your wishes): Yes, advance care planning is on file.    Fall risk  Fallen 2 or more times in the past year?: No  Any fall with injury in the past year?: No    Cognitive Screening   1) Repeat 3 items (Leader, Season, Table)    2) Clock draw: NORMAL  3) 3 item recall: Recalls 3 objects  Results: 3 items recalled: COGNITIVE IMPAIRMENT " LESS LIKELY    Mini-CogTM Copyright ALEXUS Fischer. Licensed by the author for use in [facility]; reprinted with permission (kaylyn@.[facility]). All rights reserved.      Do you have sleep apnea, excessive snoring or daytime drowsiness?: no    Reviewed and updated as needed this visit by clinical staff   Tobacco  Allergies  Meds              Reviewed and updated as needed this visit by Provider                 Social History     Tobacco Use     Smoking status: Never     Smokeless tobacco: Never   Substance Use Topics     Alcohol use: Not Currently     Comment: Occasional glass of wine and beer.             7/11/2023     1:03 PM   Alcohol Use   Prescreen: >3 drinks/day or >7 drinks/week? Not Applicable          No data to display              Do you have a current opioid prescription? No  Do you use any other controlled substances or medications that are not prescribed by a provider? None    PSA   Date Value Ref Range Status   01/14/2021 7.67 (H) 0 - 4 ug/L Final     Comment:     Assay Method:  Chemiluminescence using Siemens Vista analyzer         Current providers sharing in care for this patient include:   Patient Care Team:  Chi Betancourt MD as PCP - General (Family Medicine)  Isaac Knutson MD as Assigned PCP  Fawad Robles MD (Ophthalmology)  Chichi Cruz AuD as Audiologist (Audiology)  Sabas Nevarez MD as MD (Otolaryngology)  Sabas Nevarez MD as Assigned Surgical Provider    The following health maintenance items are reviewed in Epic and correct as of today:  Health Maintenance   Topic Date Due     HEPATITIS B IMMUNIZATION (2 of 3 - Risk 3-dose series) 11/20/2020     LIPID  04/04/2023     MEDICARE ANNUAL WELLNESS VISIT  04/08/2023     EYE EXAM  05/05/2023     COVID-19 Vaccine (6 - Pfizer series) 05/12/2023     INFLUENZA VACCINE (1) 09/01/2023     ANNUAL REVIEW OF HM ORDERS  06/23/2024     FALL RISK ASSESSMENT  07/14/2024     ADVANCE CARE PLANNING   04/08/2027     COLORECTAL CANCER SCREENING  04/27/2028     DTAP/TDAP/TD IMMUNIZATION (3 - Td or Tdap) 11/29/2028     HEPATITIS C SCREENING  Completed     PHQ-2 (once per calendar year)  Completed     Pneumococcal Vaccine: 65+ Years  Completed     ZOSTER IMMUNIZATION  Completed     IPV IMMUNIZATION  Aged Out     MENINGITIS IMMUNIZATION  Aged Out     Health Maintenance Due   Topic Date Due     HEPATITIS B IMMUNIZATION (2 of 3 - Risk 3-dose series) 11/20/2020     LIPID  04/04/2023     MEDICARE ANNUAL WELLNESS VISIT  04/08/2023     EYE EXAM  05/05/2023     COVID-19 Vaccine (6 - Pfizer series) 05/12/2023        Lab work is in process  Labs reviewed in EPIC  BP Readings from Last 3 Encounters:   07/14/23 137/72   06/23/23 (!) 144/74   05/16/23 116/73    Wt Readings from Last 3 Encounters:   07/14/23 70.8 kg (156 lb)   06/23/23 71 kg (156 lb 9.6 oz)   05/16/23 71.7 kg (158 lb)                  Patient Active Problem List   Diagnosis     Health Care Home     Hyperlipidemia with target LDL less than 130     Diverticulosis of colon     Elevated liver enzymes     Hypovitaminosis D     Environmental allergies     Seasonal allergic rhinitis     Pneumonia     BPH (benign prostatic hyperplasia)     Diverticulitis of colon     Chronic nonalcoholic liver disease     Microscopic hematuria     Umbilical hernia     Elevated glucose     Elevated prostate specific antigen (PSA)     [place]usen of macula of both eyes     Posterior vitreous detachment of right eye     Nonexudative senile macular degeneration of retina     Senile nuclear sclerosis, bilateral     Slow transit constipation     Vitreous syneresis of left eye     PVD (posterior vitreous detachment), both eyes     History of diverticulitis of colon     History of partial colectomy     Meibomian gland dysfunction (MGD) of both eyes     Dry eye of right side     Gastric erosion, unspecified chronicity     Iron deficiency anemia due to chronic blood loss     Iron deficiency anemia,  unspecified     SBO (small bowel obstruction) (H)     GIB (gastrointestinal bleeding)     Dizziness and giddiness     Abnormal liver function tests     Pure hypercholesterolemia     Hypertrophy of prostate without urinary obstruction and other lower urinary tract symptoms (LUTS)     Abnormal CT scan, colon     Imaging of gastrointestinal tract abnormal     Benign neoplasm of ascending colon     Black stool     Diaphragmatic hernia     Disorder of stomach     Gastro-esophageal reflux disease with esophagitis     Hemorrhoids     History of colonic polyps     History of resection of small bowel     Melena     Unintentional weight loss     Chronic constipation     Past Surgical History:   Procedure Laterality Date     ABDOMEN SURGERY      Diverticulitus     ARTHROSCOPY KNEE  89    right knee, LMT     ARTHROSCOPY KNEE  1988    left knee, MMT     BIOPSY      Colonoscopy / Endoscopy     COLONOSCOPY  10/7/2002    diverticulosis, due again in      COLONOSCOPY  2013     SMALL BOWEL RESECTION  3/19/2014    small bowel resection, lysis of adhesions and pelvic drain placement     TONSILLECTOMY & ADENOIDECTOMY  age of 15       Social History     Tobacco Use     Smoking status: Never     Smokeless tobacco: Never   Substance Use Topics     Alcohol use: Not Currently     Comment: Occasional glass of wine and beer.     Family History   Problem Relation Age of Onset     Cancer Mother         Colon Cancer     Colon Cancer Mother          of Colon/Intestinal cancer     Diabetes Brother         My brother  from complications of diabetes     Glaucoma No family hx of      Macular Degeneration No family hx of          Current Outpatient Medications   Medication Sig Dispense Refill     acetaminophen (TYLENOL) 500 MG tablet Take 500-1,000 mg by mouth every 6 hours as needed       Cobalamin Combinations (B-12) 100-5000 MCG SUBL        cyclobenzaprine (FLEXERIL) 5 MG tablet Take 1 tablet (5 mg) by mouth nightly as needed  "for muscle spasms 30 tablet 1     ferrous sulfate (FE TABS) 325 (65 Fe) MG EC tablet Take 325 mg by mouth daily       fexofenadine (ALLEGRA) 180 MG tablet Take 1 tablet (180 mg) by mouth daily Take 180 mg by mouth once daily. Indications: SEASONAL ALLERGIC RHINITIS 90 tablet 3     fluticasone (FLONASE) 50 MCG/ACT nasal spray USE ONE TO TWO SPRAYS IN EACH NOSTRIL EVERY DAY 48 g 2     hydrochlorothiazide (MICROZIDE) 12.5 MG capsule Take 1 capsule (12.5 mg) by mouth daily 90 capsule 1     hypromellose (GENTEAL) ophthalmic gel 0.3% Place 1 drop into both eyes every hour as needed for dry eyes       Multiple Vitamins-Minerals (ICAPS AREDS 2 PO)        olopatadine (PATANOL) 0.1 % ophthalmic solution 1 drop 2 times daily PRN       pantoprazole (PROTONIX) 20 MG EC tablet TAKE ONE TABLET BY MOUTH ONCE DAILY 90 tablet 2     Propylene Glycol (SYSTANE BALANCE OP) Apply 1 drop to eye 4 times daily       triamcinolone (KENALOG) 0.1 % external cream Apply topically 2 times daily 453.6 g 0     VITAMIN D3 25 MCG (1000 UT) tablet TAKE THREE TABLETS BY MOUTH ONCE DAILY 300 tablet 1     polyethylene glycol (MIRALAX) 17 GM/Dose powder Take 17 g by mouth as needed       Allergies   Allergen Reactions     Atorvastatin      Ezetimibe-Simvastatin      Ezetimibe-Simvastatin Muscle Pain (Myalgia)     Vancomycin Rash     Rash/hives     Vancomycin Rash     concepts of \" long covid\" reviewed      Patient believes this diagnosis applies to  him  Symptoms are rather nonspecific - some     muscle soreness   Joint stiffness ,lke arthritis   Brain fog symptoms   On and off symptoms     He feels he has intermittent symptoms and needs to \" fight these symptoms \"/.    Review of Systems   Constitutional: Negative for chills and fever.   HENT: Negative for congestion, ear pain, hearing loss and sore throat.    Eyes: Positive for visual disturbance. Negative for pain.   Respiratory: Negative for cough and shortness of breath.    Cardiovascular: Negative " "for chest pain, palpitations and peripheral edema.   Gastrointestinal: Negative for abdominal pain, constipation, diarrhea, heartburn, hematochezia and nausea.   Genitourinary: Negative for dysuria, frequency, genital sores, hematuria, impotence, penile discharge and urgency.   Musculoskeletal: Positive for arthralgias and myalgias. Negative for joint swelling.   Skin: Positive for rash.   Neurological: Positive for weakness and headaches. Negative for dizziness and paresthesias.   Psychiatric/Behavioral: Positive for mood changes. The patient is not nervous/anxious.      Constitutional, HEENT, cardiovascular, pulmonary, GI, , musculoskeletal, neuro, skin, endocrine and psych systems are negative, except as otherwise noted.    OBJECTIVE:   /72   Pulse 60   Temp 99  F (37.2  C) (Temporal)   Resp 18   Ht 1.651 m (5' 5\")   Wt 70.8 kg (156 lb)   SpO2 100%   BMI 25.96 kg/m   Estimated body mass index is 26.06 kg/m  as calculated from the following:    Height as of 4/8/22: 1.651 m (5' 5\").    Weight as of 6/23/23: 71 kg (156 lb 9.6 oz).  Physical Exam  GENERAL: healthy, alert and no distress  EYES: Eyes grossly normal to inspection, PERRL and conjunctivae and sclerae normal  HENT: ear canals and TM's normal, nose and mouth without ulcers or lesions  NECK: no adenopathy, no asymmetry, masses, or scars and thyroid normal to palpation  RESP: lungs clear to auscultation - no rales, rhonchi or wheezes  CV: regular rate and rhythm, normal S1 S2, no S3 or S4, no murmur, click or rub, no peripheral edema and peripheral pulses strong  ABDOMEN: soft, nontender, no hepatosplenomegaly, no masses and bowel sounds normal  MS: no gross musculoskeletal defects noted, no edema  SKIN: no suspicious lesions or rashes  NEURO: Normal strength and tone, mentation intact and speech normal  PSYCH: mentation appears normal, affect normal/bright    Diagnostic Test Results:  Labs reviewed in Epic  Orders Placed This Encounter "   Procedures     Lipid panel reflex to direct LDL Non-fasting     PSA, screen     Vitamin D Deficiency     Hemoglobin     Basic metabolic panel  (Ca, Cl, CO2, Creat, Gluc, K, Na, BUN)     Hemoglobin A1c         ASSESSMENT / PLAN:   (Z00.00) Encounter for subsequent annual wellness visit (AWV) in Medicare patient  (primary encounter diagnosis)  Comment: routine screening issues   Plan: see orders section of this encounter     (E78.5) Hyperlipidemia LDL goal <130  Comment: routine screening, follow up as indicated on results   Plan: Lipid panel reflex to direct LDL Non-fasting            (I10) Essential hypertension with goal blood pressure less than 140/90  Comment: controlled within acceptable limits,, continue current plan of care    Plan: hydrochlorothiazide (MICROZIDE) 12.5 MG         capsule, Basic metabolic panel  (Ca, Cl, CO2,         Creat, Gluc, K, Na, BUN)            (Z90.49) History of partial colectomy  Comment: patient has chronic cryptogenic gastroenterological bleeding and is followed for this long term. We continue with chronic proton pump inhibition  Plan: pantoprazole (PROTONIX) 20 MG EC tablet            (L25.9) Contact dermatitis and eczema  Comment: refills provided   Plan: triamcinolone (KENALOG) 0.1 % external cream            (Z12.5) Screening PSA (prostate specific antigen)  Comment: requested by patient   Plan: PSA, screen        Follow up as indicated on results     (E55.9) Hypovitaminosis D  Comment: not entirely clear to me if he needs formal prescriptions for this, follow up as indicated on results   Plan: Vitamin D Deficiency, Basic metabolic panel          (Ca, Cl, CO2, Creat, Gluc, K, Na, BUN)            (R73.09) Elevated glucose  Comment: doubt clinical significance but warrants hemoglobin a1c  [ diabetes test ]   Plan: Basic metabolic panel  (Ca, Cl, CO2, Creat,         Gluc, K, Na, BUN), Hemoglobin A1c        Follow up as indicated on results     (D50.0) Iron deficiency anemia due  to chronic blood loss  Comment: due for recheck as detailed above   Plan: Hemoglobin              Patient has been advised of split billing requirements and indicates understanding: Yes      COUNSELING:  Reviewed preventive health counseling, as reflected in patient instructions        He reports that he has never smoked. He has never used smokeless tobacco.      Appropriate preventive services were discussed with this patient, including applicable screening as appropriate for cardiovascular disease, diabetes, osteopenia/osteoporosis, and glaucoma.  As appropriate for age/gender, discussed screening for colorectal cancer, prostate cancer, breast cancer, and cervical cancer. Checklist reviewing preventive services available has been given to the patient.    Reviewed patients plan of care and provided an AVS. The Basic Care Plan (routine screening as documented in Health Maintenance) for Chi meets the Care Plan requirement. This Care Plan has been established and reviewed with the Patient.          Isaac Knutson MD  [facility]    Identified Health Risks:    I have reviewed Opioid Use Disorder and Substance Use Disorder risk factors and made any needed referrals.

## 2023-07-15 LAB — DEPRECATED CALCIDIOL+CALCIFEROL SERPL-MC: 33 UG/L (ref 20–75)

## 2023-08-08 ENCOUNTER — OFFICE VISIT (OUTPATIENT)
Dept: UROLOGY | Facility: CLINIC | Age: 78
End: 2023-08-08
Attending: INTERNAL MEDICINE
Payer: COMMERCIAL

## 2023-08-08 VITALS — OXYGEN SATURATION: 100 % | DIASTOLIC BLOOD PRESSURE: 71 MMHG | HEART RATE: 64 BPM | SYSTOLIC BLOOD PRESSURE: 126 MMHG

## 2023-08-08 DIAGNOSIS — N40.1 BENIGN PROSTATIC HYPERPLASIA WITH LOWER URINARY TRACT SYMPTOMS, SYMPTOM DETAILS UNSPECIFIED: ICD-10-CM

## 2023-08-08 DIAGNOSIS — N40.1 BENIGN PROSTATIC HYPERPLASIA WITH LOWER URINARY TRACT SYMPTOMS, SYMPTOM DETAILS UNSPECIFIED: Primary | ICD-10-CM

## 2023-08-08 DIAGNOSIS — R97.20 ELEVATED PROSTATE SPECIFIC ANTIGEN (PSA): ICD-10-CM

## 2023-08-08 PROCEDURE — 51798 US URINE CAPACITY MEASURE: CPT | Performed by: UROLOGY

## 2023-08-08 PROCEDURE — 99204 OFFICE O/P NEW MOD 45 MIN: CPT | Mod: 25 | Performed by: UROLOGY

## 2023-08-08 RX ORDER — TAMSULOSIN HYDROCHLORIDE 0.4 MG/1
0.4 CAPSULE ORAL EVERY EVENING
Qty: 90 CAPSULE | Refills: 3 | Status: SHIPPED | OUTPATIENT
Start: 2023-08-08 | End: 2023-09-11 | Stop reason: SINTOL

## 2023-08-08 RX ORDER — FINASTERIDE 5 MG/1
5 TABLET, FILM COATED ORAL DAILY
Qty: 90 TABLET | Refills: 3 | Status: SHIPPED | OUTPATIENT
Start: 2023-08-08 | End: 2023-08-08

## 2023-08-08 NOTE — PATIENT INSTRUCTIONS
Please call the [facility] radiology to schedule an MRI of the prostate. [phone].  Please call our office to schedule your follow up once you have the test scheduled, [phone].  Please contact your insurance company to make sure the MRI scan is covered under your insurance plan.

## 2023-08-08 NOTE — PROGRESS NOTES
2-4 COFFEE  2-3 WATER  3 GREEN TEA  1 JUICE  1 MILK  Bladder scan performed 0ml detected in bladder. Rachel Joseph, CMA

## 2023-08-08 NOTE — TELEPHONE ENCOUNTER
Tamsulosin was sent to us but [facility]. Patient wants them both sent to us please.  Thank you   Lyla Pruett. Pharmacy Technician   [facility]

## 2023-08-09 RX ORDER — FINASTERIDE 5 MG/1
5 TABLET, FILM COATED ORAL DAILY
Qty: 90 TABLET | Refills: 3 | Status: SHIPPED | OUTPATIENT
Start: 2023-08-09 | End: 2023-10-03

## 2023-08-13 ENCOUNTER — TRANSFERRED RECORDS (OUTPATIENT)
Dept: MULTI SPECIALTY CLINIC | Facility: CLINIC | Age: 78
End: 2023-08-13

## 2023-08-13 LAB — RETINOPATHY: NORMAL

## 2023-08-17 DIAGNOSIS — Z91.09 ENVIRONMENTAL ALLERGIES: ICD-10-CM

## 2023-08-18 RX ORDER — FLUTICASONE PROPIONATE 50 MCG
SPRAY, SUSPENSION (ML) NASAL
Qty: 48 G | Refills: 2 | Status: SHIPPED | OUTPATIENT
Start: 2023-08-18 | End: 2024-08-27

## 2023-09-06 ENCOUNTER — HOSPITAL ENCOUNTER (OUTPATIENT)
Dept: MRI IMAGING | Facility: CLINIC | Age: 78
Discharge: HOME | End: 2023-09-06
Attending: UROLOGY | Admitting: UROLOGY
Payer: COMMERCIAL

## 2023-09-06 DIAGNOSIS — R97.20 ELEVATED PROSTATE SPECIFIC ANTIGEN (PSA): ICD-10-CM

## 2023-09-06 DIAGNOSIS — N40.1 BENIGN PROSTATIC HYPERPLASIA WITH LOWER URINARY TRACT SYMPTOMS, SYMPTOM DETAILS UNSPECIFIED: ICD-10-CM

## 2023-09-06 PROCEDURE — 72197 MRI PELVIS W/O & W/DYE: CPT | Mod: 26 | Performed by: STUDENT IN AN ORGANIZED HEALTH CARE EDUCATION/TRAINING PROGRAM

## 2023-09-06 PROCEDURE — 72197 MRI PELVIS W/O & W/DYE: CPT

## 2023-09-06 PROCEDURE — 255N000002 HC RX 255 OP 636: Mod: JZ | Performed by: UROLOGY

## 2023-09-06 PROCEDURE — A9585 GADOBUTROL INJECTION: HCPCS | Mod: JZ | Performed by: UROLOGY

## 2023-09-06 RX ORDER — GADOBUTROL 604.72 MG/ML
7.5 INJECTION INTRAVENOUS ONCE
Status: COMPLETED | OUTPATIENT
Start: 2023-09-06 | End: 2023-09-06

## 2023-09-06 RX ADMIN — GADOBUTROL 7.5 ML: 604.72 INJECTION INTRAVENOUS at 16:17

## 2023-09-07 ENCOUNTER — TELEPHONE (OUTPATIENT)
Dept: UROLOGY | Facility: CLINIC | Age: 78
End: 2023-09-07
Payer: COMMERCIAL

## 2023-09-07 NOTE — TELEPHONE ENCOUNTER
Patient left office voicemail 9/7/23 at 8:48am asking for call back to set up follow up. Patient states he had his MRI prostate 9/6/23.  I scheduled patient for virtual visit on 9/8/23. I left patient a voicemail advising of the plan to discuss results with Dr. Gaytan on 9/8/23 between 9-11:00am.  Patient advised he can call our office back if this time/date do not work for his schedule.  Rachel Joseph, CMA

## 2023-09-08 ENCOUNTER — VIRTUAL VISIT (OUTPATIENT)
Dept: UROLOGY | Facility: CLINIC | Age: 78
End: 2023-09-08
Payer: COMMERCIAL

## 2023-09-08 DIAGNOSIS — R97.20 ELEVATED PROSTATE SPECIFIC ANTIGEN (PSA): Primary | ICD-10-CM

## 2023-09-08 DIAGNOSIS — N40.1 BENIGN PROSTATIC HYPERPLASIA WITH LOWER URINARY TRACT SYMPTOMS, SYMPTOM DETAILS UNSPECIFIED: ICD-10-CM

## 2023-09-08 PROCEDURE — 99213 OFFICE O/P EST LOW 20 MIN: CPT | Mod: VID | Performed by: UROLOGY

## 2023-09-08 NOTE — TELEPHONE ENCOUNTER
Patient had virtual visit with Dr. Gaytan 9/8/23.  Rachel Joseph, [facility]

## 2023-09-08 NOTE — PROGRESS NOTES
"Chi is a 78 year old who is being evaluated via a billable video visit.      How would you like to obtain your AVS? MyChart  If the video visit is dropped, the invitation should be resent by: Text to cell phone: [phone]  Will anyone else be joining your video visit? No          Assessment & Plan   Problem List Items Addressed This Visit       BPH (benign prostatic hyperplasia)    Elevated prostate specific antigen (PSA) - Primary    Relevant Orders    PSA total and free [[id number]]        Review of the result(s) of each unique test - MRI of prostate  Ordering of each unique test  Prescription drug management  20 minutes spent by me on the date of the encounter doing chart review, history and exam, documentation and further activities per the note       BMI:   Estimated body mass index is 25.96 kg/m  as calculated from the following:    Height as of 7/14/23: 1.651 m (5' 5\").    Weight as of 7/14/23: 70.8 kg (156 lb).           No follow-ups on file.    Chi Gaytan MD  [facility]    Subjective   Chi is a 78 year old, presenting for the following health issues:  No chief complaint on file.    HPI     Follow up for BPH/elevated psa.  He is doing well with flomax/proscar which work well for his LUTS.  He has mild dizziness from flomax and is doing better.  Recent prostate MRI showed   97 gms with only PIRADs 2 nodule found.    Review of Systems   Constitutional, HEENT, cardiovascular, pulmonary, gi and gu systems are negative, except as otherwise noted.      Objective           Vitals:  No vitals were obtained today due to virtual visit.    Physical Exam   GENERAL: Healthy, alert and no distress  EYES: Eyes grossly normal to inspection.  No discharge or erythema, or obvious scleral/conjunctival abnormalities.  RESP: No audible wheeze, cough, or visible cyanosis.  No visible retractions or increased work of breathing.    SKIN: Visible skin clear. No significant rash, abnormal " pigmentation or lesions.  NEURO: Cranial nerves grossly intact.  Mentation and speech appropriate for age.  PSYCH: Mentation appears normal, affect normal/bright, judgement and insight intact, normal speech and appearance well-groomed.         Elevated psa:  recheck total and free psa in one year  BPH: cont flomax/proscar        Video-Visit Details    Type of service:  Video Visit   Video Start Time:   Video End Time:    Originating Location (pt. Location): Home    Distant Location (provider location):  On-site  Platform used for Video Visit: Herson

## 2023-09-11 ENCOUNTER — TELEPHONE (OUTPATIENT)
Dept: UROLOGY | Facility: CLINIC | Age: 78
End: 2023-09-11

## 2023-09-11 ENCOUNTER — IMMUNIZATION (OUTPATIENT)
Dept: FAMILY MEDICINE | Facility: CLINIC | Age: 78
End: 2023-09-11
Payer: COMMERCIAL

## 2023-09-11 DIAGNOSIS — N40.1 BENIGN PROSTATIC HYPERPLASIA WITH LOWER URINARY TRACT SYMPTOMS, SYMPTOM DETAILS UNSPECIFIED: Primary | ICD-10-CM

## 2023-09-11 DIAGNOSIS — Z23 NEED FOR PROPHYLACTIC VACCINATION AND INOCULATION AGAINST INFLUENZA: Primary | ICD-10-CM

## 2023-09-11 PROCEDURE — 90662 IIV NO PRSV INCREASED AG IM: CPT

## 2023-09-11 PROCEDURE — G0008 ADMIN INFLUENZA VIRUS VAC: HCPCS

## 2023-09-11 PROCEDURE — 99207 PR NO CHARGE NURSE ONLY: CPT

## 2023-09-11 RX ORDER — DOXAZOSIN 1 MG/1
TABLET ORAL
Qty: 70 TABLET | Refills: 0 | Status: SHIPPED | OUTPATIENT
Start: 2023-09-11 | End: 2023-10-03 | Stop reason: ALTCHOICE

## 2023-09-11 NOTE — TELEPHONE ENCOUNTER
"Patient called and left an office voicemail stating him and Dr. Gaytan spoke on Friday.  Patient states the MRI went well.  He states the Flomax 0.4 mg is too strong and he \"gets a terrible reaction the next day.\"  He states, \"The next day it took over an hour to recover.\"     Patient admits that the medication is working well he just has a terrible reaction and is requesting a dose reduction or a different medication.    Routing to MD to advise on.    Diana VAUGHAN RN Urology 9/11/2023 3:57 PM      "

## 2023-09-14 NOTE — PROGRESS NOTES
S: Chi Newman is a pleasant  78 year old male who was requested to be seen by  Dr. Knutson for a consult with regard to patient's urinary complaints and elevated psa.  Patient complains of Nocturia x 2.  He has history of elevated PSA.  Symptoms have been on going for a couple of years(s).  Seems to be worsened over time.  His recent PSA was found to be   PSA   Date Value Ref Range Status   01/14/2021 7.67 (H) 0 - 4 ug/L Final     Comment:     Assay Method:  Chemiluminescence using Siemens Vista analyzer     Prostate Specific Antigen Screen   Date Value Ref Range Status   07/14/2023 10.20 (H) 0.00 - 6.50 ng/mL Final   .  His AUA Symptom Score:  4.  His QOL score:  4.  He has no dysuria or hematuria.    Current Outpatient Medications   Medication Sig Dispense Refill    acetaminophen (TYLENOL) 500 MG tablet Take 500-1,000 mg by mouth every 6 hours as needed      Cobalamin Combinations (B-12) 100-5000 MCG SUBL       ferrous sulfate (FE TABS) 325 (65 Fe) MG EC tablet Take 325 mg by mouth daily      fexofenadine (ALLEGRA) 180 MG tablet Take 1 tablet (180 mg) by mouth daily Take 180 mg by mouth once daily. Indications: SEASONAL ALLERGIC RHINITIS 90 tablet 3    finasteride (PROSCAR) 5 MG tablet Take 1 tablet (5 mg) by mouth daily 90 tablet 3    fluticasone (FLONASE) 50 MCG/ACT nasal spray USE ONE TO TWO SPRAYS IN EACH NOSTRIL EVERY DAY 48 g 2    hydrochlorothiazide (MICROZIDE) 12.5 MG capsule Take 1 capsule (12.5 mg) by mouth daily 90 capsule 3    hypromellose (GENTEAL) ophthalmic gel 0.3% Place 1 drop into both eyes every hour as needed for dry eyes      Multiple Vitamins-Minerals (ICAPS AREDS 2 PO)       olopatadine (PATANOL) 0.1 % ophthalmic solution 1 drop 2 times daily PRN      pantoprazole (PROTONIX) 20 MG EC tablet Take 1 tablet (20 mg) by mouth daily 90 tablet 3    Propylene Glycol (SYSTANE BALANCE OP) Apply 1 drop to eye 4 times daily      tamsulosin (FLOMAX) 0.4 MG capsule Take 1 capsule (0.4 mg) by mouth every  evening 90 capsule 3    triamcinolone (KENALOG) 0.1 % external cream Apply topically 2 times daily 453.6 g 0    VITAMIN D3 25 MCG (1000 UT) tablet TAKE THREE TABLETS BY MOUTH ONCE DAILY 300 tablet 1     Allergies   Allergen Reactions    Atorvastatin     Ezetimibe-Simvastatin     Ezetimibe-Simvastatin Muscle Pain (Myalgia)    Vancomycin Rash     Rash/hives    Vancomycin Rash     Past Medical History:   Diagnosis Date    Arthritis     Mother suffered from Arthritis.    Cancer (H)     Mother  of Cancer.    Depressive disorder     My mother suffered from Depression.    Diverticulosis of colon     Elevated liver enzymes     Hyperlipidemia LDL goal < 130     Hypovitaminosis D     Nonsenile cataract      Past Surgical History:   Procedure Laterality Date    ABDOMEN SURGERY      Diverticulitus    ARTHROSCOPY KNEE  89    right knee, LMT    ARTHROSCOPY KNEE  1988    left knee, MMT    BIOPSY      Colonoscopy / Endoscopy    COLONOSCOPY  10/7/2002    diverticulosis, due again in     COLONOSCOPY  2013    SMALL BOWEL RESECTION  3/19/2014    small bowel resection, lysis of adhesions and pelvic drain placement    TONSILLECTOMY & ADENOIDECTOMY  age of 15      Family History   Problem Relation Age of Onset    Cancer Mother         Colon Cancer    Colon Cancer Mother          of Colon/Intestinal cancer    Diabetes Brother         My brother  from complications of diabetes    Glaucoma No family hx of     Macular Degeneration No family hx of      He does not have a family history of prostate cancer.  Social History     Socioeconomic History    Marital status: Single     Spouse name: None    Number of children: 1    Years of education: None    Highest education level: None   Tobacco Use    Smoking status: Never    Smokeless tobacco: Never   Vaping Use    Vaping Use: Never used   Substance and Sexual Activity    Alcohol use: Not Currently     Comment: Occasional glass of wine and beer.    Drug use: Never     Sexual activity: Not Currently     Partners: Female     Birth control/protection: Abstinence, Condom   Other Topics Concern    Parent/sibling w/ CABG, MI or angioplasty before 65F 55M? No     Social Determinants of Health     Financial Resource Strain: Low Risk  (3/11/2020)    Overall Financial Resource Strain (CARDIA)     Difficulty of Paying Living Expenses: Not hard at all   Food Insecurity: No Food Insecurity (3/11/2020)    Hunger Vital Sign     Worried About Running Out of Food in the Last Year: Never true     Ran Out of Food in the Last Year: Never true   Transportation Needs: No Transportation Needs (3/11/2020)    PRAPARE - Transportation     Lack of Transportation (Medical): No     Lack of Transportation (Non-Medical): No   Physical Activity: Insufficiently Active (3/11/2020)    Exercise Vital Sign     Days of Exercise per Week: 3 days     Minutes of Exercise per Session: 40 min        REVIEW OF SYSTEMS  =================  C: NEGATIVE for fever, chills, change in weight  I: NEGATIVE for worrisome rashes, moles or lesions  E/M: NEGATIVE for ear, mouth and throat problems  R: NEGATIVE for significant cough or SHORTNESS OF BREATH  CV:  NEGATIVE for chest pain, palpitations or peripheral edema  GI: NEGATIVE for nausea, abdominal pain, heartburn, or change in bowel habits  NEURO: NEGATIVE numbness/weakness  : see HPI  PSYCH: NEGATIVE depression/anxiety  LYmph: no new enlarged lymph nodes  Ortho: no new trauma/movements           O: Exam:/71 (BP Location: Right arm, Patient Position: Chair, Cuff Size: Adult Large)   Pulse 64   SpO2 100%    Constitutional: healthy, alert and no distress  Cardiovascular: negative, PMI normal.   Respiratory: negative, no evidence of respiratory distress  Gastrointestinal: Abdomen soft, non-tender. BS normal. No masses, organomegaly  : penis no discharge.  Testis no masses.  No scrotal skin lesion.  Left hydrocele.  Prostate large apex only.  PVR < 50 ml  Musculoskeletal:  extremities normal- no gross deformities noted, gait normal and normal muscle tone  Skin: no suspicious lesions or rashes  Neurologic: Alert and oriented  Psychiatric: mentation appears normal. and affect normal/bright  Hematologic/Lymphatic/Immunologic: normal ant/post cervical, axillary, supraclavicular and inguinal nodes    Assessment/Plan:   (N40.1) Benign prostatic hyperplasia with lower urinary tract symptoms, symptom details unspecified  (primary encounter diagnosis)  Comment:    Plan: start flomax/proscar           Side effects discussed    (R97.20) Elevated prostate specific antigen (PSA)  Comment:    Plan: very large prostate           Prostate MRI next.          No

## 2023-10-03 ENCOUNTER — NURSE TRIAGE (OUTPATIENT)
Dept: UROLOGY | Facility: CLINIC | Age: 78
End: 2023-10-03
Payer: COMMERCIAL

## 2023-10-03 DIAGNOSIS — N40.1 BENIGN PROSTATIC HYPERPLASIA WITH LOWER URINARY TRACT SYMPTOMS, SYMPTOM DETAILS UNSPECIFIED: ICD-10-CM

## 2023-10-03 RX ORDER — FINASTERIDE 5 MG/1
5 TABLET, FILM COATED ORAL DAILY
Qty: 90 TABLET | Refills: 3 | Status: SHIPPED | OUTPATIENT
Start: 2023-10-03 | End: 2024-08-15

## 2023-10-03 RX ORDER — DOXAZOSIN 1 MG/1
3 TABLET ORAL AT BEDTIME
Qty: 180 TABLET | Refills: 5 | Status: SHIPPED | OUTPATIENT
Start: 2023-10-03 | End: 2023-12-22

## 2023-10-03 NOTE — TELEPHONE ENCOUNTER
Pt called into clinic and stated that he recently started medication and is now having trouble in the 3rd week and would like a call back.    Diana VAUGHAN RN Urology 10/3/2023 7:48 AM

## 2023-10-03 NOTE — TELEPHONE ENCOUNTER
Per Dr. Gaytan, If he is dizzy at the 3 mg dose, pt should go to 2mg    Writer called and left VM for patient to call back.  Awaiting call back.    Diana VAUGHAN RN Urology 10/3/2023 1:08 PM

## 2023-10-03 NOTE — TELEPHONE ENCOUNTER
Pt called back and thinks it may be related to a combination of allergies and medication. He wants to stay at 3mg. He did not have any SE today. Pt is comfortable staying at 3mg at this time.    Signed Prescriptions:                        Disp   Refills    doxazosin (CARDURA) 1 MG tablet            180 ta*5        Sig: Take 3 tablets (3 mg) by mouth At Bedtime  Authorizing Provider: OSEAS ANDERSON  Ordering User: SOCORRO WILCOX    finasteride (PROSCAR) 5 MG tablet          90 tab*3        Sig: Take 1 tablet (5 mg) by mouth daily  Authorizing Provider: OSEAS ANDERSON  Ordering User: SOCORRO WILCOX RN Urology 10/3/2023 1:44 PM

## 2023-10-03 NOTE — TELEPHONE ENCOUNTER
"Writer called and spoke with juan. Pt states, \"I have a small issue. I was given medication to take for BPH (doxazosin).   The flomax proved to be too strong. I am in the 3rd week of [facility]. Everything was going well until 10/1/23. I was taking 3 per day. I was feeling weird, foggy, dizzy. I am getting ready to go to 4 per day on Thursday and I do not think    I am only going once instead of 3-4 times at night. Its working pretty well.    I currently take it at night, but I am not wanting to take it in the morning.     Reason for Disposition   Caller has NON-URGENT medicine question about med that PCP or specialist prescribed and triager unable to answer question    Additional Information   Negative: MORE THAN A DOUBLE DOSE of a prescription or over-the-counter (OTC) drug   Negative: DOUBLE DOSE (an extra dose or lesser amount) of prescription drug and any symptoms (e.g., dizziness, nausea, pain, sleepiness)   Negative: DOUBLE DOSE (an extra dose or lesser amount) of over-the-counter (OTC) drug and any symptoms (e.g., dizziness, nausea, pain, sleepiness)   Negative: Took another person's prescription drug   Negative: DOUBLE DOSE (an extra dose or lesser amount) of prescription drug and NO symptoms  (Exception: A double dose of antibiotics.)   Negative: Diabetes drug error or overdose (e.g., took wrong type of insulin or took extra dose)   Negative: Caller has medication question about med NOT prescribed by PCP and triager unable to answer question (e.g., compatibility with other med, storage)   Negative: Prescription not at pharmacy and was prescribed by PCP recently  (Exception: triager has access to EMR and prescription is recorded there. Go to Home Care and confirm for pharmacy.)   Negative: Pharmacy calling with prescription question and triager unable to answer question   Negative: Caller has URGENT medicine question about med that PCP or specialist prescribed and triager unable to answer question   " "Negative: Drug overdose and triager unable to answer question   Negative: Caller requesting a renewal or refill of a medicine patient is currently taking   Negative: Caller requesting information unrelated to medicine   Negative: Caller requesting information about COVID-19 Vaccine   Negative: Caller requesting information about Emergency Contraception   Negative: Caller requesting information about Combined Birth Control Pills   Negative: Caller requesting information about Progestin Birth Control Pills   Negative: Caller requesting information about Post-Op pain or medicines   Negative: Caller requesting a prescription antibiotic (such as penicillin) for Strep throat and has a positive culture result   Negative: Caller requesting a prescription anti-viral med (such as Tamiflu) and has influenza (flu) symptoms   Negative: Immunization reaction suspected   Negative: Rash while taking a medicine or within 3 days of stopping it   Negative: Asthma and having symptoms of asthma (cough, wheezing, etc.)   Negative: Symptom of illness (e.g., headache, abdominal pain, earache, vomiting) that are more than mild   Negative: Breastfeeding questions about mother's medicines and diet    Answer Assessment - Initial Assessment Questions  1. NAME of MEDICINE: \"What medicine(s) are you calling about?\"      Doxazosin  2. QUESTION: \"What is your question?\" (e.g., double dose of medicine, side effect)      Do I want to up the dosage to 4 or is there a problem sticking to 3?  3. PRESCRIBER: \"Who prescribed the medicine?\" Reason: if prescribed by specialist, call should be referred to that group.      Dr. Gaytan  4. SYMPTOMS: \"Do you have any symptoms?\" If Yes, ask: \"What symptoms are you having?\"  \"How bad are the symptoms (e.g., mild, moderate, severe)      Foggy, dizzy  5. PREGNANCY:  \"Is there any chance that you are pregnant?\" \"When was your last menstrual period?\"      NA    Protocols used: Medication Question Call-A-OH    Routing to " provider.    Diana VAUGHAN RN Urology 10/3/2023 11:55 AM

## 2023-10-18 ENCOUNTER — TELEPHONE (OUTPATIENT)
Dept: FAMILY MEDICINE | Facility: CLINIC | Age: 78
End: 2023-10-18
Payer: COMMERCIAL

## 2023-10-18 NOTE — TELEPHONE ENCOUNTER
No need for call Patient's chart opened up when opening up another patient's chart.    Rissa Levy [facility]

## 2023-10-30 ENCOUNTER — TELEPHONE (OUTPATIENT)
Dept: UROLOGY | Facility: CLINIC | Age: 78
End: 2023-10-30
Payer: COMMERCIAL

## 2023-10-30 NOTE — TELEPHONE ENCOUNTER
Patient left office VM 10/30/23 at 9:22am asking for call back to discuss doxazosin dose.   Patient states he has reduced from 3 mg to 2 mg per day.  Rachel Joseph, CMA

## 2023-10-30 NOTE — TELEPHONE ENCOUNTER
Left message for patient to call [phone].  Per Dr. Lucila soni for patient to reduce dose. Amount used is based on symptoms.  Rachel Joseph, CMA

## 2023-10-31 NOTE — TELEPHONE ENCOUNTER
Patient reports he is doing well on 2 pills. OK per Dr. Gaytan. Patient will request refills when needed.  Patient to follow up as directed/scheduled.  Rachel Joseph, CMA

## 2023-12-21 ENCOUNTER — TELEPHONE (OUTPATIENT)
Dept: UROLOGY | Facility: CLINIC | Age: 78
End: 2023-12-21
Payer: COMMERCIAL

## 2023-12-21 DIAGNOSIS — N40.1 BENIGN PROSTATIC HYPERPLASIA WITH LOWER URINARY TRACT SYMPTOMS, SYMPTOM DETAILS UNSPECIFIED: ICD-10-CM

## 2023-12-21 NOTE — TELEPHONE ENCOUNTER
[facility] Call Center    Phone Message    May a detailed message be left on voicemail: no     Reason for Call: Other: Patient states there was a mix up on the quantity of doxazosin (CARDURA) 1 MG tablet. Please fix as the bottle says 3 per day when it should be two per day. Please call back patient to verify.     Action Taken: Other: [facility]    Travel Screening: Not Applicable

## 2023-12-22 RX ORDER — DOXAZOSIN 1 MG/1
2 TABLET ORAL AT BEDTIME
Qty: 180 TABLET | Refills: 2 | Status: SHIPPED | OUTPATIENT
Start: 2023-12-22 | End: 2024-08-15

## 2024-01-31 ENCOUNTER — TELEPHONE (OUTPATIENT)
Dept: FAMILY MEDICINE | Facility: CLINIC | Age: 79
End: 2024-01-31
Payer: COMMERCIAL

## 2024-01-31 NOTE — TELEPHONE ENCOUNTER
Order/Referral Request    Who is requesting: patient    Orders being requested: Labs for Iron level and hemoglobin    Reason service is needed/diagnosis: Iron defient    When are orders needed by: ASAP    Has this been discussed with Provider: Yes    Does patient have a preference on a Group/Provider/Facility? [place]    Does patient have an appointment scheduled?: No, not at this time    Where to send orders: Place orders within Epic    Could we send this information to you in [facility] or would you prefer to receive a phone call?:   Patient would prefer a phone call   Okay to leave a detailed message?: Yes at Cell number on file:    Telephone Information:   Mobile [phone]

## 2024-01-31 NOTE — TELEPHONE ENCOUNTER
Spoke with pt. Symptoms started yesterday afternoon. States he is having weakness in his joints, neck, shoulders, arms and legs. No one sided weakness. Last time he experienced this he had to have a blood draw. Has dizziness when getting up and walking around. Can't get up too fast and can't move fast. Has some cloudiness with his thinking. Has sore joints as well. No vision problems. No hearing problems. Is more SOB than normal. 1 hour ago was worse. Denies SOB at rest. Drank a B supplement before 0600 and some gatorade and that had him feeling stronger. Has a history of iron deficiency. Has same symptoms as he did when he had his last iron studies and hgb checked 1 year ago, but was also having GI problems at that time. Denies dark or black stools. Pt was advised that he should be evaluated by a Provider as soon as possible. Pt disagreed and is requesting orders for hgb, and iron studies. Pt states he wasn't able to get an appt scheduled for lab today. Pt has a lab only appt already scheduled for 1030 tomorrow. Pt was trying to log into his mychart to review his history during the call and said he had to hang up the phone.     Routing to Provider to advise. Pt is aware that PCP is out of office today.    Rachel Ledesma RN  [facility]

## 2024-01-31 NOTE — TELEPHONE ENCOUNTER
Patient needs to be seen for orders. There are many things that could cause these symptoms and we would want to make sure that we order the correct testing.   Kailey Garcia PA-C

## 2024-01-31 NOTE — TELEPHONE ENCOUNTER
Provider's message was relayed to pt. Pt again declined to be evaluated. States if he needs to, will go to ER. Pt insisted that his message be sent to Dr. Russell to advise.     Rachel Ledesma RN  [facility]

## 2024-02-01 ENCOUNTER — OFFICE VISIT (OUTPATIENT)
Dept: FAMILY MEDICINE | Facility: CLINIC | Age: 79
End: 2024-02-01
Payer: COMMERCIAL

## 2024-02-01 VITALS
SYSTOLIC BLOOD PRESSURE: 155 MMHG | DIASTOLIC BLOOD PRESSURE: 72 MMHG | OXYGEN SATURATION: 99 % | BODY MASS INDEX: 25.16 KG/M2 | HEART RATE: 57 BPM | RESPIRATION RATE: 16 BRPM | HEIGHT: 65 IN | WEIGHT: 151 LBS | TEMPERATURE: 99 F

## 2024-02-01 DIAGNOSIS — R53.83 OTHER FATIGUE: ICD-10-CM

## 2024-02-01 DIAGNOSIS — D50.9 IRON DEFICIENCY ANEMIA, UNSPECIFIED IRON DEFICIENCY ANEMIA TYPE: ICD-10-CM

## 2024-02-01 DIAGNOSIS — R42 DIZZINESS: Primary | ICD-10-CM

## 2024-02-01 LAB
ALBUMIN SERPL BCG-MCNC: 4.5 G/DL (ref 3.5–5.2)
ALP SERPL-CCNC: 85 U/L (ref 40–150)
ALT SERPL W P-5'-P-CCNC: 12 U/L (ref 0–70)
ANION GAP SERPL CALCULATED.3IONS-SCNC: 11 MMOL/L (ref 7–15)
AST SERPL W P-5'-P-CCNC: 27 U/L (ref 0–45)
BASOPHILS # BLD AUTO: 0.1 10E3/UL (ref 0–0.2)
BASOPHILS NFR BLD AUTO: 1 %
BILIRUB SERPL-MCNC: 0.6 MG/DL
BUN SERPL-MCNC: 11.9 MG/DL (ref 8–23)
CALCIUM SERPL-MCNC: 9.7 MG/DL (ref 8.8–10.2)
CHLORIDE SERPL-SCNC: 101 MMOL/L (ref 98–107)
CREAT SERPL-MCNC: 0.89 MG/DL (ref 0.67–1.17)
DEPRECATED HCO3 PLAS-SCNC: 26 MMOL/L (ref 22–29)
EGFRCR SERPLBLD CKD-EPI 2021: 88 ML/MIN/1.73M2
EOSINOPHIL # BLD AUTO: 0.1 10E3/UL (ref 0–0.7)
EOSINOPHIL NFR BLD AUTO: 2 %
ERYTHROCYTE [DISTWIDTH] IN BLOOD BY AUTOMATED COUNT: 14.4 % (ref 10–15)
FERRITIN SERPL-MCNC: 27 NG/ML (ref 31–409)
GLUCOSE SERPL-MCNC: 94 MG/DL (ref 70–99)
HCT VFR BLD AUTO: 41.9 % (ref 40–53)
HGB BLD-MCNC: 14 G/DL (ref 13.3–17.7)
IMM GRANULOCYTES # BLD: 0 10E3/UL
IMM GRANULOCYTES NFR BLD: 0 %
IRON BINDING CAPACITY (ROCHE): 321 UG/DL (ref 240–430)
IRON SATN MFR SERPL: 26 % (ref 15–46)
IRON SERPL-MCNC: 83 UG/DL (ref 61–157)
LYMPHOCYTES # BLD AUTO: 1.2 10E3/UL (ref 0.8–5.3)
LYMPHOCYTES NFR BLD AUTO: 18 %
MCH RBC QN AUTO: 27.5 PG (ref 26.5–33)
MCHC RBC AUTO-ENTMCNC: 33.4 G/DL (ref 31.5–36.5)
MCV RBC AUTO: 82 FL (ref 78–100)
MONOCYTES # BLD AUTO: 0.5 10E3/UL (ref 0–1.3)
MONOCYTES NFR BLD AUTO: 8 %
NEUTROPHILS # BLD AUTO: 4.5 10E3/UL (ref 1.6–8.3)
NEUTROPHILS NFR BLD AUTO: 71 %
PLATELET # BLD AUTO: 278 10E3/UL (ref 150–450)
POTASSIUM SERPL-SCNC: 4.4 MMOL/L (ref 3.4–5.3)
PROT SERPL-MCNC: 7.7 G/DL (ref 6.4–8.3)
RBC # BLD AUTO: 5.09 10E6/UL (ref 4.4–5.9)
SODIUM SERPL-SCNC: 138 MMOL/L (ref 135–145)
TSH SERPL DL<=0.005 MIU/L-ACNC: 1.63 UIU/ML (ref 0.3–4.2)
WBC # BLD AUTO: 6.3 10E3/UL (ref 4–11)

## 2024-02-01 PROCEDURE — 83540 ASSAY OF IRON: CPT | Performed by: FAMILY MEDICINE

## 2024-02-01 PROCEDURE — 36415 COLL VENOUS BLD VENIPUNCTURE: CPT | Performed by: FAMILY MEDICINE

## 2024-02-01 PROCEDURE — 82728 ASSAY OF FERRITIN: CPT | Performed by: FAMILY MEDICINE

## 2024-02-01 PROCEDURE — 99214 OFFICE O/P EST MOD 30 MIN: CPT | Performed by: FAMILY MEDICINE

## 2024-02-01 PROCEDURE — 80053 COMPREHEN METABOLIC PANEL: CPT | Performed by: FAMILY MEDICINE

## 2024-02-01 PROCEDURE — 85025 COMPLETE CBC W/AUTO DIFF WBC: CPT | Performed by: FAMILY MEDICINE

## 2024-02-01 PROCEDURE — 83550 IRON BINDING TEST: CPT | Performed by: FAMILY MEDICINE

## 2024-02-01 PROCEDURE — 84443 ASSAY THYROID STIM HORMONE: CPT | Performed by: FAMILY MEDICINE

## 2024-02-01 RX ORDER — RESPIRATORY SYNCYTIAL VIRUS VACCINE 120MCG/0.5
0.5 KIT INTRAMUSCULAR ONCE
Qty: 1 EACH | Refills: 0 | Status: CANCELLED | OUTPATIENT
Start: 2024-02-01 | End: 2024-02-01

## 2024-02-01 ASSESSMENT — ENCOUNTER SYMPTOMS: FATIGUE: 1

## 2024-02-01 NOTE — PROGRESS NOTES
"  Assessment & Plan   Problem List Items Addressed This Visit       Iron deficiency anemia, unspecified    Relevant Orders    CBC with platelets and differential    Iron and iron binding capacity    Ferritin     Other Visit Diagnoses       Dizziness    -  Primary    Relevant Orders    CBC with platelets and differential    TSH with free T4 reflex    Comprehensive metabolic panel (BMP + Alb, Alk Phos, ALT, AST, Total. Bili, TP)    Iron and iron binding capacity    Ferritin    Other fatigue        Relevant Orders    CBC with platelets and differential    TSH with free T4 reflex    Comprehensive metabolic panel (BMP + Alb, Alk Phos, ALT, AST, Total. Bili, TP)           BP elevated, but may be labile through the day with Cardura - labs pending - suspect Cardura is responsible for fatigue/dizziness  BMI  Estimated body mass index is 25.13 kg/m  as calculated from the following:    Height as of this encounter: 1.651 m (5' 5\").    Weight as of this encounter: 68.5 kg (151 lb).       Cruzito Mireles is a 79 year old, presenting for the following health issues:  Fatigue (Weakness)        2/1/2024     9:57 AM   Additional Questions   Roomed by Rissa NGUYỄN CMA     Via the Health Maintenance questionnaire, the patient has reported the following services have been completed -Eye Exam, this information has been sent to the abstraction team.  History of Present Illness       Reason for visit:  Lab tests    He eats 2-3 servings of fruits and vegetables daily.He consumes 2 sweetened beverage(s) daily.He exercises with enough effort to increase his heart rate 30 to 60 minutes per day.  He exercises with enough effort to increase his heart rate 6 days per week.   He is taking medications regularly.     Yesterday he felt more tired than usual - dizziness  COVID-19 infection May 2022 - he wonders if he has Long COVID  Taking proscar and cardura since Summer 2023 for BPH      Objective    BP (!) 155/72 (BP Location: Left arm, " "Patient Position: Chair, Cuff Size: Adult Regular)   Pulse 57   Temp 99  F (37.2  C) (Oral)   Resp 16   Ht 1.651 m (5' 5\")   Wt 68.5 kg (151 lb)   SpO2 99%   BMI 25.13 kg/m    Body mass index is 25.13 kg/m .  Physical Exam   GENERAL: alert and no distress  NECK: no adenopathy, no asymmetry, masses, or scars  RESP: lungs clear to auscultation - no rales, rhonchi or wheezes  CV: regular rate and rhythm, normal S1 S2, no S3 or S4, no murmur, click or rub, no peripheral edema  ABDOMEN: soft, nontender, no hepatosplenomegaly, no masses and bowel sounds normal  MS: no gross musculoskeletal defects noted, no edema  Neuro: normal CN2-12          Signed Electronically by: YOLY ESQUIVEL DO    "

## 2024-02-01 NOTE — TELEPHONE ENCOUNTER
Patient needs to be seen for orders.  Will need to cancel lab appointment today    Chi Russell MD

## 2024-02-01 NOTE — TELEPHONE ENCOUNTER
"Writer called patient to relay provider's message. Patient insisting that he would only like the labs drawn today 02/01/24 as he has been fasting for them. Writer discussed that to have orders placed patient would need to have an appointment with a provider, patient states that is not what they have done in the past and this \"makes no sense\" writer apologized for the frustration and confusion but notes that per provider advisement an appointment is needed as they want to ensure all appropriate labs are being ordered.    Writer discussed that the lab appointment would need to be cancelled since there are no orders, but instead offered patient appointment with available provider today at 1000, discussing if labs are needed they can be ordered within appointment and drawn after. Patient states that \"is such short notice, you should have called sooner\", writer apologized and discussed that we were awaiting provider response, discussed that if patient would like a later appointment we can look throughout system, patient denied. Patient states that will arrive for appointment at 0940 as directed, patient then said \"you aren't going to turn me away are you?\" Writer discussed that the appointment has been made with a provider and as long as patient arrives on time he will be seen and assessed. Patient states understanding.    CHRISTIE Cha RN  [facility]    "

## 2024-02-14 NOTE — LETTER
December 2, 2020      Chi Newman  [address] [place] [address] [address]  [place] [address]        Dear ,    We are writing to inform you of your test results.    All of these tests are within acceptable limits , things look good !       Resulted Orders   Iron and iron binding capacity   Result Value Ref Range    Iron 49 35 - 180 ug/dL    Iron Binding Cap 295 240 - 430 ug/dL    Iron Saturation Index 17 15 - 46 %   Ferritin   Result Value Ref Range    Ferritin 72 26 - 388 ng/mL   CBC with platelets and differential   Result Value Ref Range    WBC CANLAB 4.0 - 11.0 10e9/L      Comment:      CORRECTED ON 12/01 AT 1047: PREVIOUSLY REPORTED AS 6.6    RBC Count CANLAB 4.4 - 5.9 10e12/L      Comment:      CORRECTED ON 12/01 AT 1047: PREVIOUSLY REPORTED AS 5.13    Hemoglobin CANLAB 13.3 - 17.7 g/dL      Comment:      CORRECTED ON 12/01 AT 1047: PREVIOUSLY REPORTED AS 13.9    Hematocrit CANLAB 40.0 - 53.0 %      Comment:      CORRECTED ON 12/01 AT 1047: PREVIOUSLY REPORTED AS 42.5    MCV CANLAB 78 - 100 fl      Comment:      CORRECTED ON 12/01 AT 1047: PREVIOUSLY REPORTED AS 83    MCH CANLAB 26.5 - 33.0 pg      Comment:      CORRECTED ON 12/01 AT 1047: PREVIOUSLY REPORTED AS 27.1    MCHC CANLAB 31.5 - 36.5 g/dL      Comment:      CORRECTED ON 12/01 AT 1047: PREVIOUSLY REPORTED AS 32.7    RDW CANLAB 10.0 - 15.0 %      Comment:      CORRECTED ON 12/01 AT 1047: PREVIOUSLY REPORTED AS 22.2    Platelet Count CANLAB 150 - 450 10e9/L      Comment:      CORRECTED ON 12/01 AT 1047: PREVIOUSLY REPORTED     Diff Method CANLAB       Comment:      CORRECTED ON 12/01 AT 1106: PREVIOUSLY REPORTED AS CANLA, CORRECTED ON 12/01   AT 1047: PREVIOUSLY REPORTED AS Automated Method      % Neutrophils CANLAB %      Comment:      CORRECTED ON 12/01 AT 1120: PREVIOUSLY REPORTED AS 69.0    % Lymphocytes CANLAB %      Comment:      CORRECTED ON 12/01 AT 1120: PREVIOUSLY REPORTED AS 19.7    % Monocytes CANLAB %      Comment:      CORRECTED ON  ERP at bedside. Pt on monitor   12/01 AT 1120: PREVIOUSLY REPORTED AS 8.4    % Eosinophils CANLAB %      Comment:      CORRECTED ON 12/01 AT 1120: PREVIOUSLY REPORTED AS 2.4    % Basophils CANLAB %      Comment:      CORRECTED ON 12/01 AT 1120: PREVIOUSLY REPORTED AS 0.5    Absolute Neutrophil CANLAB 1.6 - 8.3 10e9/L      Comment:      CORRECTED ON 12/01 AT 1120: PREVIOUSLY REPORTED AS 4.6    Absolute Lymphocytes CANLAB 0.8 - 5.3 10e9/L      Comment:      CORRECTED ON 12/01 AT 1120: PREVIOUSLY REPORTED AS 1.3    Absolute Monocytes CANLAB 0.0 - 1.3 10e9/L      Comment:      CORRECTED ON 12/01 AT 1120: PREVIOUSLY REPORTED AS 0.6    Absolute Eosinophils CANLAB 0.0 - 0.7 10e9/L      Comment:      CORRECTED ON 12/01 AT 1120: PREVIOUSLY REPORTED AS 0.2    Absolute Basophils CANLAB 0.0 - 0.2 10e9/L      Comment:      CORRECTED ON 12/01 AT 1120: PREVIOUSLY REPORTED AS 0.0   CBC with platelets and differential   Result Value Ref Range    WBC 6.8 4.0 - 11.0 10e9/L    RBC Count 5.14 4.4 - 5.9 10e12/L    Hemoglobin 14.0 13.3 - 17.7 g/dL    Hematocrit 42.3 40.0 - 53.0 %    MCV 82 78 - 100 fl    MCH 27.2 26.5 - 33.0 pg    MCHC 33.1 31.5 - 36.5 g/dL    RDW 22.3 (H) 10.0 - 15.0 %    Platelet Count 297 150 - 450 10e9/L    % Neutrophils 68.0 %    % Lymphocytes 20.0 %    % Monocytes 9.0 %    % Eosinophils 2.0 %    % Basophils 1.0 %    Absolute Neutrophil 4.6 1.6 - 8.3 10e9/L    Absolute Lymphocytes 1.4 0.8 - 5.3 10e9/L    Absolute Monocytes 0.6 0.0 - 1.3 10e9/L    Absolute Eosinophils 0.1 0.0 - 0.7 10e9/L    Absolute Basophils 0.1 0.0 - 0.2 10e9/L    Anisocytosis Slight     Elliptocytes Slight     Macrocytes Present     Platelet Estimate       Automated count confirmed.  Platelet morphology is normal.    Diff Method Automated Method        If you have any questions or concerns, please call the clinic at the number listed above.       Sincerely,      Isaac Knutson MD/[facility]

## 2024-02-15 ENCOUNTER — OFFICE VISIT (OUTPATIENT)
Dept: FAMILY MEDICINE | Facility: CLINIC | Age: 79
End: 2024-02-15
Payer: COMMERCIAL

## 2024-02-15 DIAGNOSIS — N40.0 BENIGN PROSTATIC HYPERPLASIA WITHOUT LOWER URINARY TRACT SYMPTOMS: ICD-10-CM

## 2024-02-15 DIAGNOSIS — R53.82 CHRONIC FATIGUE: Primary | ICD-10-CM

## 2024-02-15 PROCEDURE — 99214 OFFICE O/P EST MOD 30 MIN: CPT | Performed by: FAMILY MEDICINE

## 2024-02-15 NOTE — PROGRESS NOTES
"  Assessment & Plan       ICD-10-CM    1. Chronic fatigue  R53.82       2. Benign prostatic hyperplasia without lower urinary tract symptoms  N40.0         Consider stopping cardura completely to assess whether symptoms resolve  Discuss with urology    Review of external notes as documented elsewhere in note  I spent a total of 32 minutes on the day of the visit.   Time spent by me doing chart review, history and exam, documentation and further activities per the note      BMI  Estimated body mass index is 25.76 kg/m  as calculated from the following:    Height as of this encounter: 1.651 m (5' 5\").    Weight as of this encounter: 70.2 kg (154 lb 12.8 oz).   Weight management plan: Discussed healthy diet and exercise guidelines      There are no Patient Instructions on file for this visit.    Cruzito Mireles is a 79 year old, presenting for the following health issues:  RECHECK (Fatigue and weakness follow up)        2/15/2024     4:06 PM   Additional Questions   Roomed by Chaya   Accompanied by none         2/15/2024     4:06 PM   Patient Reported Additional Medications   Patient reports taking the following new medications none     History of Present Illness       Reason for visit:  Lab tests    He eats 2-3 servings of fruits and vegetables daily.He consumes 2 sweetened beverage(s) daily.He exercises with enough effort to increase his heart rate 30 to 60 minutes per day.  He exercises with enough effort to increase his heart rate 4 days per week.   He is taking medications regularly.     Covid early 2023    Bph  Elevated PSA  Prostate MRI was normal  Started on cardura at that point  Symptoms may coincide with cardura start  Still has fatigue and brain fog    Wt Readings from Last 4 Encounters:   02/15/24 70.2 kg (154 lb 12.8 oz)   02/01/24 68.5 kg (151 lb)   07/14/23 70.8 kg (156 lb)   06/23/23 71 kg (156 lb 9.6 oz)     BP Readings from Last 6 Encounters:   02/15/24 133/62   02/01/24 (!) 155/72   08/08/23 " "126/71   07/14/23 137/72   06/23/23 (!) 144/74   05/16/23 116/73                   Review of Systems  Constitutional, HEENT, cardiovascular, pulmonary, gi and gu systems are negative, except as otherwise noted.      Objective    /62   Pulse 56   Temp 98  F (36.7  C) (Temporal)   Resp 16   Ht 1.651 m (5' 5\")   Wt 70.2 kg (154 lb 12.8 oz)   SpO2 100%   BMI 25.76 kg/m    Body mass index is 25.76 kg/m .  Physical Exam  Constitutional:       General: He is not in acute distress.     Appearance: Normal appearance. He is well-developed. He is not ill-appearing.   HENT:      Head: Normocephalic and atraumatic.      Right Ear: External ear normal.      Left Ear: External ear normal.      Nose: Nose normal.   Eyes:      General: No scleral icterus.     Extraocular Movements: Extraocular movements intact.      Conjunctiva/sclera: Conjunctivae normal.   Cardiovascular:      Rate and Rhythm: Normal rate.   Pulmonary:      Effort: Pulmonary effort is normal.   Musculoskeletal:      Cervical back: Normal range of motion and neck supple.   Skin:     General: Skin is warm and dry.   Neurological:      Mental Status: He is alert and oriented to person, place, and time.   Psychiatric:         Behavior: Behavior normal.         Thought Content: Thought content normal.         Judgment: Judgment normal.                    Signed Electronically by: Chi Russell MD    "

## 2024-02-16 VITALS
TEMPERATURE: 98 F | SYSTOLIC BLOOD PRESSURE: 133 MMHG | RESPIRATION RATE: 16 BRPM | HEART RATE: 56 BPM | DIASTOLIC BLOOD PRESSURE: 62 MMHG | WEIGHT: 154.8 LBS | HEIGHT: 65 IN | BODY MASS INDEX: 25.79 KG/M2 | OXYGEN SATURATION: 100 %

## 2024-04-25 ENCOUNTER — MYC MEDICAL ADVICE (OUTPATIENT)
Dept: FAMILY MEDICINE | Facility: CLINIC | Age: 79
End: 2024-04-25
Payer: COMMERCIAL

## 2024-04-29 DIAGNOSIS — Z13.220 ENCOUNTER FOR LIPID SCREENING FOR CARDIOVASCULAR DISEASE: Primary | ICD-10-CM

## 2024-04-29 DIAGNOSIS — Z13.6 ENCOUNTER FOR LIPID SCREENING FOR CARDIOVASCULAR DISEASE: Primary | ICD-10-CM

## 2024-04-29 NOTE — TELEPHONE ENCOUNTER
Does patient need to fast for upcoming appointment? Lipid panel was last done 7/14/23.  
Yes fast for labs    Chi Russell MD   
same name as above

## 2024-05-07 ENCOUNTER — OFFICE VISIT (OUTPATIENT)
Dept: FAMILY MEDICINE | Facility: CLINIC | Age: 79
End: 2024-05-07
Payer: COMMERCIAL

## 2024-05-07 VITALS
HEIGHT: 65 IN | DIASTOLIC BLOOD PRESSURE: 68 MMHG | TEMPERATURE: 98 F | SYSTOLIC BLOOD PRESSURE: 139 MMHG | RESPIRATION RATE: 16 BRPM | OXYGEN SATURATION: 100 % | WEIGHT: 154.4 LBS | HEART RATE: 61 BPM | BODY MASS INDEX: 25.72 KG/M2

## 2024-05-07 DIAGNOSIS — E61.1 LOW IRON: ICD-10-CM

## 2024-05-07 DIAGNOSIS — Z12.5 SCREENING FOR PROSTATE CANCER: ICD-10-CM

## 2024-05-07 DIAGNOSIS — I10 ESSENTIAL HYPERTENSION WITH GOAL BLOOD PRESSURE LESS THAN 140/90: Primary | ICD-10-CM

## 2024-05-07 DIAGNOSIS — R79.0 LOW IRON STORES: ICD-10-CM

## 2024-05-07 LAB
BASOPHILS # BLD AUTO: 0.1 10E3/UL (ref 0–0.2)
BASOPHILS NFR BLD AUTO: 1 %
EOSINOPHIL # BLD AUTO: 0.1 10E3/UL (ref 0–0.7)
EOSINOPHIL NFR BLD AUTO: 2 %
ERYTHROCYTE [DISTWIDTH] IN BLOOD BY AUTOMATED COUNT: 13.7 % (ref 10–15)
HCT VFR BLD AUTO: 42.9 % (ref 40–53)
HGB BLD-MCNC: 14.4 G/DL (ref 13.3–17.7)
IMM GRANULOCYTES # BLD: 0 10E3/UL
IMM GRANULOCYTES NFR BLD: 0 %
LYMPHOCYTES # BLD AUTO: 1.2 10E3/UL (ref 0.8–5.3)
LYMPHOCYTES NFR BLD AUTO: 17 %
MCH RBC QN AUTO: 28 PG (ref 26.5–33)
MCHC RBC AUTO-ENTMCNC: 33.6 G/DL (ref 31.5–36.5)
MCV RBC AUTO: 84 FL (ref 78–100)
MONOCYTES # BLD AUTO: 0.6 10E3/UL (ref 0–1.3)
MONOCYTES NFR BLD AUTO: 8 %
NEUTROPHILS # BLD AUTO: 5.3 10E3/UL (ref 1.6–8.3)
NEUTROPHILS NFR BLD AUTO: 73 %
PLATELET # BLD AUTO: 278 10E3/UL (ref 150–450)
RBC # BLD AUTO: 5.14 10E6/UL (ref 4.4–5.9)
RETICS # AUTO: 0.07 10E6/UL
RETICS/RBC NFR AUTO: 1.4 %
WBC # BLD AUTO: 7.2 10E3/UL (ref 4–11)

## 2024-05-07 PROCEDURE — 99207 BLOOD MORPHOLOGY PATHOLOGIST REVIEW: CPT | Performed by: PATHOLOGY

## 2024-05-07 PROCEDURE — 99214 OFFICE O/P EST MOD 30 MIN: CPT | Performed by: FAMILY MEDICINE

## 2024-05-07 PROCEDURE — G2211 COMPLEX E/M VISIT ADD ON: HCPCS | Performed by: FAMILY MEDICINE

## 2024-05-07 PROCEDURE — 82728 ASSAY OF FERRITIN: CPT | Performed by: FAMILY MEDICINE

## 2024-05-07 PROCEDURE — 85045 AUTOMATED RETICULOCYTE COUNT: CPT | Performed by: FAMILY MEDICINE

## 2024-05-07 PROCEDURE — 83550 IRON BINDING TEST: CPT | Performed by: FAMILY MEDICINE

## 2024-05-07 PROCEDURE — 85025 COMPLETE CBC W/AUTO DIFF WBC: CPT | Performed by: FAMILY MEDICINE

## 2024-05-07 PROCEDURE — G0103 PSA SCREENING: HCPCS | Performed by: FAMILY MEDICINE

## 2024-05-07 PROCEDURE — 36415 COLL VENOUS BLD VENIPUNCTURE: CPT | Performed by: FAMILY MEDICINE

## 2024-05-07 PROCEDURE — 83540 ASSAY OF IRON: CPT | Performed by: FAMILY MEDICINE

## 2024-05-07 RX ORDER — LISINOPRIL/HYDROCHLOROTHIAZIDE 10-12.5 MG
1 TABLET ORAL DAILY
Qty: 90 TABLET | Refills: 3 | Status: SHIPPED | OUTPATIENT
Start: 2024-05-07 | End: 2024-05-07

## 2024-05-07 RX ORDER — LISINOPRIL/HYDROCHLOROTHIAZIDE 10-12.5 MG
1 TABLET ORAL DAILY
Qty: 90 TABLET | Refills: 1 | Status: SHIPPED | OUTPATIENT
Start: 2024-05-07

## 2024-05-07 NOTE — PATIENT INSTRUCTIONS
Chi    It was a pleasure seeing you in clinic today.  Here's the plan:    Low ferritin - labs today to recheck  Check PSA today  Blood pressure medication changed (lisinopril-hydrochlorothiazide)    Let me know if you have questions.    Chi Russell MD

## 2024-05-07 NOTE — PROGRESS NOTES
Assessment & Plan       ICD-10-CM    1. Essential hypertension with goal blood pressure less than 140/90  I10 lisinopril-hydrochlorothiazide (ZESTORETIC) 10-12.5 MG tablet     DISCONTINUED: lisinopril-hydrochlorothiazide (ZESTORETIC) 10-12.5 MG tablet      2. Low iron  E61.1 CBC with Platelets & Differential     Iron & Iron Binding Capacity     Ferritin     Lab Blood Morphology Pathologist Review     Iron & Iron Binding Capacity     Ferritin     Lab Blood Morphology Pathologist Review     CANCELED: CBC with Platelets & Differential      3. Low iron stores  R79.0 CBC with Platelets & Differential     Iron & Iron Binding Capacity     Ferritin     Lab Blood Morphology Pathologist Review     Iron & Iron Binding Capacity     Ferritin     Lab Blood Morphology Pathologist Review     CANCELED: CBC with Platelets & Differential      4. Screening for prostate cancer  Z12.5 Prostate Specific Antigen Screen     Prostate Specific Antigen Screen        The longitudinal plan of care for the diagnosis(es)/condition(s) as documented were addressed during this visit. Due to the added complexity in care, I will continue to support Chi in the subsequent management and with ongoing continuity of care.     Review of external notes as documented elsewhere in note          Patient Instructions   Chi    It was a pleasure seeing you in clinic today.  Here's the plan:    Low ferritin - labs today to recheck  Check PSA today  Blood pressure medication changed (lisinopril-hydrochlorothiazide)    Let me know if you have questions.    Chi Russell MD      Subjective   Chi is a 79 year old, presenting for the following health issues:  Blood Draw (Ferritin, iron, hemoglobin)        5/7/2024    10:52 AM   Additional Questions   Roomed by Chaya   Accompanied by megan         5/7/2024    10:52 AM   Patient Reported Additional Medications   Patient reports taking the following new medications none     History of Present Illness  "      Reason for visit:  Take Blood tests to review my Ferritin, Iron, and hemoglobin levels. Also, take another PSA review.    He eats 2-3 servings of fruits and vegetables daily.He consumes 3 sweetened beverage(s) daily.He exercises with enough effort to increase his heart rate 20 to 29 minutes per day.  He exercises with enough effort to increase his heart rate 5 days per week.   He is taking medications regularly.     BP Readings from Last 6 Encounters:   05/07/24 139/68   02/15/24 133/62   02/01/24 (!) 155/72   08/08/23 126/71   07/14/23 137/72   06/23/23 (!) 144/74     Wt Readings from Last 4 Encounters:   05/07/24 70 kg (154 lb 6.4 oz)   02/15/24 70.2 kg (154 lb 12.8 oz)   02/01/24 68.5 kg (151 lb)   07/14/23 70.8 kg (156 lb)     Hypertension  Borderline control  Hydrochlorothiazide 12.5 mg daily.    BPH  Well-controlled with current therapy however having issues with Cardura.  Lightheadedness, chronic fatigue  PSA is elevated only last year, needs recheck.  He is going to see urology in about a month or so    History of anemia  Taking iron supplement once daily with vitamin C.              Review of Systems  Constitutional, HEENT, cardiovascular, pulmonary, gi and gu systems are negative, except as otherwise noted.      Objective    /68   Pulse 61   Temp 98  F (36.7  C) (Temporal)   Resp 16   Ht 1.651 m (5' 5\")   Wt 70 kg (154 lb 6.4 oz)   SpO2 100%   BMI 25.69 kg/m    Body mass index is 25.69 kg/m .  Physical Exam  Constitutional:       General: He is not in acute distress.     Appearance: Normal appearance. He is well-developed. He is not ill-appearing.   HENT:      Head: Normocephalic and atraumatic.      Right Ear: External ear normal.      Left Ear: External ear normal.      Nose: Nose normal.   Eyes:      General: No scleral icterus.     Extraocular Movements: Extraocular movements intact.      Conjunctiva/sclera: Conjunctivae normal.   Cardiovascular:      Rate and Rhythm: Normal rate. "   Pulmonary:      Effort: Pulmonary effort is normal.   Musculoskeletal:      Cervical back: Normal range of motion and neck supple.   Skin:     General: Skin is warm and dry.   Neurological:      Mental Status: He is alert and oriented to person, place, and time.   Psychiatric:         Behavior: Behavior normal.         Thought Content: Thought content normal.         Judgment: Judgment normal.                    Signed Electronically by: Chi Russell MD

## 2024-05-08 ENCOUNTER — MYC MEDICAL ADVICE (OUTPATIENT)
Dept: FAMILY MEDICINE | Facility: CLINIC | Age: 79
End: 2024-05-08
Payer: COMMERCIAL

## 2024-05-08 LAB
FERRITIN SERPL-MCNC: 24 NG/ML (ref 31–409)
IRON BINDING CAPACITY (ROCHE): 297 UG/DL (ref 240–430)
IRON SATN MFR SERPL: 28 % (ref 15–46)
IRON SERPL-MCNC: 83 UG/DL (ref 61–157)
PATH REPORT.COMMENTS IMP SPEC: NORMAL
PATH REPORT.FINAL DX SPEC: NORMAL
PATH REPORT.MICROSCOPIC SPEC OTHER STN: NORMAL
PATH REPORT.MICROSCOPIC SPEC OTHER STN: NORMAL
PATH REPORT.RELEVANT HX SPEC: NORMAL
PSA SERPL DL<=0.01 NG/ML-MCNC: 5.01 NG/ML (ref 0–6.5)

## 2024-05-08 NOTE — TELEPHONE ENCOUNTER
[employer] message sent to patient.     Thanks,  PRINCESS Hines  [facility]

## 2024-05-13 ENCOUNTER — MYC MEDICAL ADVICE (OUTPATIENT)
Dept: UROLOGY | Facility: CLINIC | Age: 79
End: 2024-05-13
Payer: COMMERCIAL

## 2024-05-13 DIAGNOSIS — N40.1 BENIGN PROSTATIC HYPERPLASIA WITH LOWER URINARY TRACT SYMPTOMS, SYMPTOM DETAILS UNSPECIFIED: ICD-10-CM

## 2024-05-15 ENCOUNTER — MYC MEDICAL ADVICE (OUTPATIENT)
Dept: FAMILY MEDICINE | Facility: CLINIC | Age: 79
End: 2024-05-15
Payer: COMMERCIAL

## 2024-05-16 NOTE — TELEPHONE ENCOUNTER
PSA is a test to screen for prostate cancer and has no bearing on whether his BPH medications need to be changed.  I recommend discussing this further with his urologist.    Chi Russell MD

## 2024-05-17 NOTE — TELEPHONE ENCOUNTER
Patient reports that he still has some occasional dizziness/grogginess even though he has reduced his medication to 2 tabs at bedtime.  Patient reports that his urination is much improved while on the medication.  Patient is sleeping better and is feeling better.   Patient reports that he is going to stay on the medication until September when he sees Dr. Gaytan for his visit and will discuss his condition at that time.  MD advised of message and condition and ok with this plan.    Rachel Joseph, CMA

## 2024-06-04 ENCOUNTER — NURSE TRIAGE (OUTPATIENT)
Dept: FAMILY MEDICINE | Facility: CLINIC | Age: 79
End: 2024-06-04
Payer: COMMERCIAL

## 2024-06-04 NOTE — TELEPHONE ENCOUNTER
"Nurse Triage SBAR    Is this a 2nd Level Triage? YES, LICENSED PRACTITIONER REVIEW IS REQUIRED    Situation: ear pain left side     Background: he uses Qtips but stated he is always very careful     Assessment: Left sided ear painful that is intermittent. This started yesterday evening. 2/10 Wonders if there is water trapped.  He does not swim. Taking tylenol but getting worse. He takes allegra for sinus conditions. Denies fever. He has a mild headache. His ear can make popping sounds, as if there is moisture in there. Stated that it sounds like a pulse in his ear that occurred while he was on the phone. He is having discharge.       Protocol Recommended Disposition:   See in Office Today    Recommendation: RN encouraged patient to be seen for a visit today per protocol.  He stated he would rather be seen tomorrow. He stated he is not scared to use the urgent care, but does not feel this is appropriate. RN gave examples of UC locations.     No visits in person today in .     Routed to provider    Does the patient meet one of the following criteria for ADS visit consideration? No        Reason for Disposition   White, yellow, or green discharge    Additional Information   Negative: Sounds like a life-threatening emergency to the triager   Negative: Moving the earlobe or touching the ear clearly increases the pain   Negative: Foreign body stuck in the ear (e.g., bug, piece of cotton)   Negative: Pink or red swelling behind the ear   Negative: Stiff neck (can't touch chin to chest)   Negative: Severe earache pain   Negative: Patient sounds very sick or weak to the triager   Negative: Fever > 103 F (39.4 C)   Negative: Pointed object was inserted into the ear canal (e.g., a pencil, stick, or wire)    Answer Assessment - Initial Assessment Questions  1. LOCATION: \"Which ear is involved?\"      Left ear   2. ONSET: \"When did the ear start hurting\"       Started yesterday afternoon   3. SEVERITY: \"How bad is the pain?\"  " "(Scale 1-10; mild, moderate or severe)    - MILD (1-3): doesn't interfere with normal activities     - MODERATE (4-7): interferes with normal activities or awakens from sleep     - SEVERE (8-10): excruciating pain, unable to do any normal activities       2/10  4. URI SYMPTOMS: \"Do you have a runny nose or cough?\"      Denies   5. FEVER: \"Do you have a fever?\" If Yes, ask: \"What is your temperature, how was it measured, and when did it start?\"      Denies   6. CAUSE: \"Have you been swimming recently?\", \"How often do you use Q-TIPS?\", \"Have you had any recent air travel or scuba diving?\"   Denies swimming. Uses Qtips but is very careful   7. OTHER SYMPTOMS: \"Do you have any other symptoms?\" (e.g., headache, stiff neck, dizziness, vomiting, runny nose, decreased hearing)   Headache present  8. PREGNANCY: \"Is there any chance you are pregnant?\" \"When was your last menstrual period?\"      na    Protocols used: Earache-A-[place]    "

## 2024-06-04 NOTE — TELEPHONE ENCOUNTER
Spoke with patient. Patient explains that his earache is mild, and not severe. Patient declining to be seen today in UC, although RN encouraged patient to be seen today to prevent further worsening of symptoms.    Patient requesting appointment for tomorrow instead. Assisted with booking appointment for tomorrow at  clinic at 4:20 pm as requested.     Advised if any further worsening of symptoms, he should be seen sooner in UC/ED. Patient verbalized understanding and has no further questions at this time.     CHRISTIE Bowers RN  [facility]

## 2024-06-05 ENCOUNTER — OFFICE VISIT (OUTPATIENT)
Dept: FAMILY MEDICINE | Facility: CLINIC | Age: 79
End: 2024-06-05
Payer: COMMERCIAL

## 2024-06-05 VITALS
BODY MASS INDEX: 25.83 KG/M2 | OXYGEN SATURATION: 99 % | TEMPERATURE: 97.9 F | SYSTOLIC BLOOD PRESSURE: 135 MMHG | HEART RATE: 57 BPM | WEIGHT: 155 LBS | HEIGHT: 65 IN | DIASTOLIC BLOOD PRESSURE: 69 MMHG | RESPIRATION RATE: 18 BRPM

## 2024-06-05 DIAGNOSIS — Z91.09 ENVIRONMENTAL ALLERGIES: ICD-10-CM

## 2024-06-05 DIAGNOSIS — H60.92 OTITIS EXTERNA OF LEFT EAR, UNSPECIFIED CHRONICITY, UNSPECIFIED TYPE: Primary | ICD-10-CM

## 2024-06-05 PROCEDURE — 99213 OFFICE O/P EST LOW 20 MIN: CPT | Performed by: FAMILY MEDICINE

## 2024-06-05 RX ORDER — NEOMYCIN SULFATE, POLYMYXIN B SULFATE AND HYDROCORTISONE 10; 3.5; 1 MG/ML; MG/ML; [USP'U]/ML
3 SUSPENSION/ DROPS AURICULAR (OTIC) 4 TIMES DAILY
Qty: 10 ML | Refills: 0 | Status: SHIPPED | OUTPATIENT
Start: 2024-06-05 | End: 2024-06-12

## 2024-06-05 RX ORDER — RESPIRATORY SYNCYTIAL VIRUS VACCINE 120MCG/0.5
0.5 KIT INTRAMUSCULAR ONCE
Qty: 1 EACH | Refills: 0 | Status: CANCELLED | OUTPATIENT
Start: 2024-06-05 | End: 2024-06-05

## 2024-06-05 ASSESSMENT — PAIN SCALES - GENERAL: PAINLEVEL: NO PAIN (1)

## 2024-06-05 NOTE — PATIENT INSTRUCTIONS
Use drops left ear for 5-7 days    Follow up as needed based on symptoms     Continue allergy meds

## 2024-06-05 NOTE — PROGRESS NOTES
"      Cruzito Mireles is a 79 year old, presenting for the following health issues:  Ear Problem (Left ear problem)        6/5/2024     3:57 PM   Additional Questions   Roomed by Amanda Branham     History of Present Illness       Reason for visit:  Experiencing pain inside of left ear. Ear feels plugged or congested.    He eats 2-3 servings of fruits and vegetables daily.He consumes 2 sweetened beverage(s) daily.He exercises with enough effort to increase his heart rate 10 to 19 minutes per day.  He exercises with enough effort to increase his heart rate 4 days per week. He is missing 1 dose(s) of medications per week.  He is not taking prescribed medications regularly due to side effects.           Some discharge  left  ear    About 3-4 days now    Being treated for prostate issues    Cardura is helping urination    Some allergy symptoms   Dry eyes, uses systane drops    Had white drainage from left ear           Objective    BP (!) 143/66 (BP Location: Left arm, Patient Position: Chair, Cuff Size: Adult Regular)   Pulse 57   Temp 97.9  F (36.6  C) (Temporal)   Resp 18   Ht 1.651 m (5' 5\")   Wt 70.3 kg (155 lb)   SpO2 99%   BMI 25.79 kg/m    Body mass index is 25.79 kg/m .  Physical Exam  Constitutional:       Appearance: Normal appearance.   HENT:      Head: Normocephalic and atraumatic.      Right Ear: Tympanic membrane, ear canal and external ear normal.      Left Ear: Tympanic membrane normal.      Ears:      Comments: Left canal mildly irritated. Some crusty material present.  Only minimal wax.  Eyes:      Conjunctiva/sclera: Conjunctivae normal.   Cardiovascular:      Rate and Rhythm: Normal rate and regular rhythm.      Heart sounds: Normal heart sounds.   Pulmonary:      Effort: Pulmonary effort is normal. No respiratory distress.      Breath sounds: Normal breath sounds.   Neurological:      Mental Status: He is alert.        No edema    Radials symmetric       ASSESSMENT / PLAN:  (H60.92) " Otitis externa of left ear, unspecified chronicity, unspecified type  (primary encounter diagnosis)  Comment: will treat.    Plan: neomycin-polymyxin-hydrocortisone (CORTISPORIN)        [id number] otic suspension        Follow up prn     (Z91.09) Environmental allergies  Comment: continue allergy med   Plan: as above     Follow up prn       I reviewed the patient's medications, allergies, medical history, family history, and social history.    Andrez Montano MD          Signed Electronically by: Andrez Montano MD

## 2024-06-14 ENCOUNTER — PATIENT OUTREACH (OUTPATIENT)
Dept: CARE COORDINATION | Facility: CLINIC | Age: 79
End: 2024-06-14
Payer: COMMERCIAL

## 2024-06-28 ENCOUNTER — PATIENT OUTREACH (OUTPATIENT)
Dept: CARE COORDINATION | Facility: CLINIC | Age: 79
End: 2024-06-28
Payer: COMMERCIAL

## 2024-08-06 ENCOUNTER — MYC MEDICAL ADVICE (OUTPATIENT)
Dept: UROLOGY | Facility: CLINIC | Age: 79
End: 2024-08-06
Payer: COMMERCIAL

## 2024-08-08 ENCOUNTER — LAB (OUTPATIENT)
Dept: LAB | Facility: CLINIC | Age: 79
End: 2024-08-08
Payer: COMMERCIAL

## 2024-08-08 DIAGNOSIS — N40.1 BENIGN PROSTATIC HYPERPLASIA WITH LOWER URINARY TRACT SYMPTOMS, SYMPTOM DETAILS UNSPECIFIED: Primary | ICD-10-CM

## 2024-08-08 DIAGNOSIS — R97.20 ELEVATED PROSTATE SPECIFIC ANTIGEN (PSA): ICD-10-CM

## 2024-08-08 DIAGNOSIS — N40.1 BENIGN PROSTATIC HYPERPLASIA WITH LOWER URINARY TRACT SYMPTOMS, SYMPTOM DETAILS UNSPECIFIED: ICD-10-CM

## 2024-08-08 LAB — PSA SERPL DL<=0.01 NG/ML-MCNC: 3.5 NG/ML (ref 0–6.5)

## 2024-08-08 PROCEDURE — 84153 ASSAY OF PSA TOTAL: CPT

## 2024-08-08 PROCEDURE — 36415 COLL VENOUS BLD VENIPUNCTURE: CPT

## 2024-08-09 ENCOUNTER — TELEPHONE (OUTPATIENT)
Dept: UROLOGY | Facility: CLINIC | Age: 79
End: 2024-08-09
Payer: COMMERCIAL

## 2024-08-09 NOTE — TELEPHONE ENCOUNTER
[facility] Call Center    Phone Message    May a detailed message be left on voicemail: yes     Reason for Call: Patient is calling wanting to schedule follow-up visit with Dr. Gaytan.    Patient did not want writer to schedule this visit. Writer told patient that I can help getting the follow-up visit with Dr. Gaytan scheduled, patient declined since he did not know the writer. Writer explained that we are the scheduling department for Dr. Gaytan. Patient is insisting Rachel schedules this appointment. Please call patient back to discuss.     Action Taken: Other: FK - Urology    Travel Screening: Not Applicable     Date of Service:

## 2024-08-15 ENCOUNTER — VIRTUAL VISIT (OUTPATIENT)
Dept: UROLOGY | Facility: CLINIC | Age: 79
End: 2024-08-15
Payer: COMMERCIAL

## 2024-08-15 DIAGNOSIS — N40.1 BENIGN PROSTATIC HYPERPLASIA WITH LOWER URINARY TRACT SYMPTOMS, SYMPTOM DETAILS UNSPECIFIED: Primary | ICD-10-CM

## 2024-08-15 DIAGNOSIS — R97.20 ELEVATED PROSTATE SPECIFIC ANTIGEN (PSA): ICD-10-CM

## 2024-08-15 PROCEDURE — 99213 OFFICE O/P EST LOW 20 MIN: CPT | Mod: 95 | Performed by: UROLOGY

## 2024-08-15 RX ORDER — DOXAZOSIN 1 MG/1
2 TABLET ORAL AT BEDTIME
Qty: 180 TABLET | Refills: 2 | Status: SHIPPED | OUTPATIENT
Start: 2024-08-15

## 2024-08-15 RX ORDER — FINASTERIDE 5 MG/1
5 TABLET, FILM COATED ORAL DAILY
Qty: 90 TABLET | Refills: 3 | Status: SHIPPED | OUTPATIENT
Start: 2024-08-15

## 2024-08-15 NOTE — PROGRESS NOTES
"Chi is a 79 year old who is being evaluated via a billable video visit.      How would you like to obtain your AVS? MyChart  If the video visit is dropped, the invitation should be resent by: Text to cell phone: [phone]  Will anyone else be joining your video visit? No          Assessment & Plan   Problem List Items Addressed This Visit       BPH (benign prostatic hyperplasia) - Primary    Elevated prostate specific antigen (PSA)        Review of the result(s) of each unique test - MRI of prostate  Ordering of each unique test  Prescription drug management  20 minutes spent by me on the date of the encounter doing chart review, history and exam, documentation and further activities per the note       BMI:   Estimated body mass index is 25.79 kg/m  as calculated from the following:    Height as of 6/5/24: 1.651 m (5' 5\").    Weight as of 6/5/24: 70.3 kg (155 lb).           No follow-ups on file.    Chi Gaytan MD  [facility]   Chi is a 79 year old, presenting for the following health issues:  No chief complaint on file.    HPI     Follow up for BPH/elevated psa.  He is doing well with doxazosin/proscar which work well for his LUTS.   Prostate MRI in 2023 showed   97 gms with only PIRADs 2 nodule found.  He has some dizziness from doxazosin but tolerable.  Recent psa was 3.5 (7).      Review of Systems   Constitutional, HEENT, cardiovascular, pulmonary, gi and gu systems are negative, except as otherwise noted.      Objective           Vitals:  No vitals were obtained today due to virtual visit.    Physical Exam   GENERAL: Healthy, alert and no distress  EYES: Eyes grossly normal to inspection.  No discharge or erythema, or obvious scleral/conjunctival abnormalities.  RESP: No audible wheeze, cough, or visible cyanosis.  No visible retractions or increased work of breathing.    SKIN: Visible skin clear. No significant rash, abnormal pigmentation or " lesions.  NEURO: Cranial nerves grossly intact.  Mentation and speech appropriate for age.  PSYCH: Mentation appears normal, affect normal/bright, judgement and insight intact, normal speech and appearance well-groomed.         Elevated psa:  recheck prn  BPH: cont flomax/proscar        Video-Visit Details    Type of service:  Video Visit   Video Start Time:   Video End Time:    Originating Location (pt. Location): Home    Distant Location (provider location):  On-site  Platform used for Video Visit: [employer]

## 2024-08-26 DIAGNOSIS — Z90.49 HISTORY OF PARTIAL COLECTOMY: ICD-10-CM

## 2024-08-26 DIAGNOSIS — Z91.09 ENVIRONMENTAL ALLERGIES: ICD-10-CM

## 2024-08-27 RX ORDER — PANTOPRAZOLE SODIUM 20 MG/1
20 TABLET, DELAYED RELEASE ORAL DAILY
Qty: 90 TABLET | Refills: 0 | Status: SHIPPED | OUTPATIENT
Start: 2024-08-27

## 2024-08-27 RX ORDER — FLUTICASONE PROPIONATE 50 MCG
SPRAY, SUSPENSION (ML) NASAL
Qty: 48 G | Refills: 0 | Status: SHIPPED | OUTPATIENT
Start: 2024-08-27

## 2024-09-08 ENCOUNTER — HEALTH MAINTENANCE LETTER (OUTPATIENT)
Age: 79
End: 2024-09-08

## 2024-09-20 ENCOUNTER — TELEPHONE (OUTPATIENT)
Dept: FAMILY MEDICINE | Facility: CLINIC | Age: 79
End: 2024-09-20
Payer: COMMERCIAL

## 2024-09-20 NOTE — TELEPHONE ENCOUNTER
Patient calling    He has been having lingering symptoms of a cold/flu like virus for the past 4 weeks.     He has some congestion, but cough and fever are gone. Current temp is 98.4 F    RN scheduled a visit with him on Monday 9/23 to follow up. He will go to  if sx worsen over the weekend. He will cancel the appt scheduled for Monday if he feels his sx are improved by then.    Pearl Ojeda RN

## 2024-09-23 ENCOUNTER — OFFICE VISIT (OUTPATIENT)
Dept: FAMILY MEDICINE | Facility: CLINIC | Age: 79
End: 2024-09-23
Payer: COMMERCIAL

## 2024-09-23 VITALS
HEIGHT: 65 IN | BODY MASS INDEX: 25.49 KG/M2 | OXYGEN SATURATION: 98 % | HEART RATE: 60 BPM | RESPIRATION RATE: 20 BRPM | DIASTOLIC BLOOD PRESSURE: 75 MMHG | WEIGHT: 153 LBS | TEMPERATURE: 98.4 F | SYSTOLIC BLOOD PRESSURE: 155 MMHG

## 2024-09-23 DIAGNOSIS — J06.9 VIRAL URI: Primary | ICD-10-CM

## 2024-09-23 PROCEDURE — 99213 OFFICE O/P EST LOW 20 MIN: CPT | Performed by: FAMILY MEDICINE

## 2024-09-23 ASSESSMENT — PAIN SCALES - GENERAL: PAINLEVEL: NO PAIN (0)

## 2024-09-23 NOTE — PROGRESS NOTES
"  Assessment & Plan       ICD-10-CM    1. Viral URI  J06.9         He seems to be recovering from a viral URI  His vital signs and exam are unremarkable and reassuring today  I recommended continued symptomatic treatment and expectant management    If new, worsening or persistent symptoms, the patient is to call or return for a recheck.    BMI  Estimated body mass index is 25.46 kg/m  as calculated from the following:    Height as of this encounter: 1.651 m (5' 5\").    Weight as of this encounter: 69.4 kg (153 lb).           Cruzito Mireles is a 79 year old, presenting for the following health issues:  URI      9/23/2024     9:55 AM   Additional Questions   Roomed by cam         9/23/2024     9:55 AM   Patient Reported Additional Medications   Patient reports taking the following new medications none   Patient tested neg for covid 8/31/24  URI    History of Present Illness       Reason for visit:  I believe that I have contacted the flu virus. I have had all  the usual symptoms within the past 4 weeks. I am in need of treatment / prescriptions in order to put an end to my symptoms.   He is taking medications regularly.     He comes in with a roughly 4-week history of URI symptoms including cough, congestion, and runny nose.  He had a sore throat for a while, but that cleared last week.  He has had some intermittent diarrhea.  He did a home COVID test a few weeks ago which was negative.  He called on Friday with his symptoms and was set up for this appointment today, but he does note that he has been feeling better over the weekend.    Patient Active Problem List   Diagnosis    Hyperlipidemia with target LDL less than 130    Diverticulosis of colon    Elevated liver enzymes    Hypovitaminosis D    Environmental allergies    Seasonal allergic rhinitis    Pneumonia    BPH (benign prostatic hyperplasia)    Chronic nonalcoholic liver disease    Microscopic hematuria    Umbilical hernia    Elevated glucose    " Elevated prostate specific antigen (PSA)    [place]us[place] of macula of both eyes    Posterior vitreous detachment of right eye    Nonexudative senile macular degeneration of retina    Senile nuclear sclerosis, bilateral    Slow transit constipation    Vitreous syneresis of left eye    PVD (posterior vitreous detachment), both eyes    History of diverticulitis of colon    History of partial colectomy    Meibomian gland dysfunction (MGD) of both eyes    Dry eye of right side    Gastric erosion, unspecified chronicity    Iron deficiency anemia due to chronic blood loss    Iron deficiency anemia, unspecified    SBO (small bowel obstruction) (H)    GIB (gastrointestinal bleeding)    Dizziness and giddiness    Abnormal liver function tests    Pure hypercholesterolemia    Hypertrophy of prostate without urinary obstruction and other lower urinary tract symptoms (LUTS)    Abnormal CT scan, colon    Imaging of gastrointestinal tract abnormal    Benign neoplasm of ascending colon    Black stool    Diaphragmatic hernia    Disorder of stomach    Gastro-esophageal reflux disease with esophagitis    Hemorrhoids    History of colonic polyps    History of resection of small bowel    Melena    Unintentional weight loss    Chronic constipation     Current Outpatient Medications   Medication Sig Dispense Refill    acetaminophen (TYLENOL) 500 MG tablet Take 500-1,000 mg by mouth every 6 hours as needed      Cobalamin Combinations (B-12) 100-5000 MCG SUBL       doxazosin (CARDURA) 1 MG tablet Take 2 tablets (2 mg) by mouth at bedtime 180 tablet 2    fexofenadine (ALLEGRA) 180 MG tablet Take 1 tablet (180 mg) by mouth daily Take 180 mg by mouth once daily. Indications: SEASONAL ALLERGIC RHINITIS 90 tablet 3    finasteride (PROSCAR) 5 MG tablet Take 1 tablet (5 mg) by mouth daily 90 tablet 3    fluticasone (FLONASE) 50 MCG/ACT nasal spray USE ONE TO TWO SPRAYS IN EACH NOSTRIL EVERY DAY 48 g 0    lisinopril-hydrochlorothiazide (ZESTORETIC)  "10-12.5 MG tablet Take 1 tablet by mouth daily 90 tablet 1    Multiple Vitamins-Minerals (ICAPS AREDS 2 PO)       pantoprazole (PROTONIX) 20 MG EC tablet TAKE ONE TABLET BY MOUTH ONCE DAILY 90 tablet 0    triamcinolone (KENALOG) 0.1 % external cream Apply topically 2 times daily 453.6 g 0    VITAMIN D3 25 MCG (1000 UT) tablet TAKE THREE TABLETS BY MOUTH ONCE DAILY 300 tablet 1     No current facility-administered medications for this visit.           Review of Systems  Noncontributory except as above.      Objective    BP (!) 149/66 (BP Location: Right arm, Patient Position: Sitting, Cuff Size: Adult Regular)   Pulse 60   Temp 98.4  F (36.9  C) (Temporal)   Resp 20   Ht 1.651 m (5' 5\")   Wt 69.4 kg (153 lb)   SpO2 98%   BMI 25.46 kg/m    Body mass index is 25.46 kg/m .  Physical Exam   GENERAL: alert and no distress  EYES: Eyes grossly normal to inspection, PERRL and conjunctivae and sclerae normal  HENT: ear canals and TM's normal, nose and mouth without ulcers or lesions  NECK: no adenopathy, no asymmetry, masses, or scars  RESP: lungs clear to auscultation - no rales, rhonchi or wheezes  ABDOMEN: soft, nontender, no hepatosplenomegaly, no masses and bowel sounds normal            Signed Electronically by: Carl Ellis MD    "

## 2024-10-29 ENCOUNTER — MYC MEDICAL ADVICE (OUTPATIENT)
Dept: UROLOGY | Facility: CLINIC | Age: 79
End: 2024-10-29
Payer: COMMERCIAL

## 2024-11-01 NOTE — TELEPHONE ENCOUNTER
Discontinued Finasteride and Doxazosin per Dr. Lucila VAUGHAN, RN   [facility], Urology   11/1/2024 11:04 AM

## 2024-11-21 DIAGNOSIS — Z90.49 HISTORY OF PARTIAL COLECTOMY: ICD-10-CM

## 2024-11-22 ENCOUNTER — MYC REFILL (OUTPATIENT)
Dept: INTERNAL MEDICINE | Facility: CLINIC | Age: 79
End: 2024-11-22
Payer: COMMERCIAL

## 2024-11-22 DIAGNOSIS — Z90.49 HISTORY OF PARTIAL COLECTOMY: ICD-10-CM

## 2024-11-24 RX ORDER — PANTOPRAZOLE SODIUM 20 MG/1
20 TABLET, DELAYED RELEASE ORAL DAILY
Qty: 90 TABLET | Refills: 0 | OUTPATIENT
Start: 2024-11-24

## 2024-11-24 RX ORDER — PANTOPRAZOLE SODIUM 20 MG/1
20 TABLET, DELAYED RELEASE ORAL DAILY
Qty: 90 TABLET | Refills: 3 | Status: SHIPPED | OUTPATIENT
Start: 2024-11-24

## 2024-11-25 ENCOUNTER — TELEPHONE (OUTPATIENT)
Dept: FAMILY MEDICINE | Facility: CLINIC | Age: 79
End: 2024-11-25
Payer: COMMERCIAL

## 2024-11-25 NOTE — TELEPHONE ENCOUNTER
Patient calling to check on status of pantoprazole refill.    It was sent by PCP 11/24/24 at 10 pm.    Patient will call [facility] to see about refill  and let us know if it is not available.    Christine M Klisch, RN

## 2024-12-03 ENCOUNTER — TELEPHONE (OUTPATIENT)
Dept: FAMILY MEDICINE | Facility: CLINIC | Age: 79
End: 2024-12-03
Payer: COMMERCIAL

## 2024-12-03 NOTE — TELEPHONE ENCOUNTER
Patient Quality Outreach    Patient is due for the following:   Physical Annual Wellness Visit      Topic Date Due    Hepatitis A Vaccine (1 of 2 - Risk 2-dose series) Never done    Hepatitis B Vaccine (2 of 3 - Risk 3-dose series) 11/20/2020    Flu Vaccine (1) 09/01/2024    COVID-19 Vaccine (6 - 2024-25 season) 09/01/2024       Action(s) Taken:   Schedule a Annual Wellness Visit    Type of outreach:    Sent [employer] message.    Questions for provider review:    None           Chaya Barba CMA

## 2024-12-04 ENCOUNTER — TELEPHONE (OUTPATIENT)
Dept: FAMILY MEDICINE | Facility: CLINIC | Age: 79
End: 2024-12-04
Payer: COMMERCIAL

## 2024-12-23 ENCOUNTER — PATIENT OUTREACH (OUTPATIENT)
Dept: CARE COORDINATION | Facility: CLINIC | Age: 79
End: 2024-12-23
Payer: COMMERCIAL

## 2025-02-19 ENCOUNTER — VIRTUAL VISIT (OUTPATIENT)
Dept: UROLOGY | Facility: CLINIC | Age: 80
End: 2025-02-19
Payer: COMMERCIAL

## 2025-02-19 DIAGNOSIS — R97.20 ELEVATED PROSTATE SPECIFIC ANTIGEN (PSA): ICD-10-CM

## 2025-02-19 DIAGNOSIS — N40.1 BENIGN PROSTATIC HYPERPLASIA WITH LOWER URINARY TRACT SYMPTOMS, SYMPTOM DETAILS UNSPECIFIED: Primary | ICD-10-CM

## 2025-02-19 PROCEDURE — 98005 SYNCH AUDIO-VIDEO EST LOW 20: CPT | Performed by: UROLOGY

## 2025-02-19 NOTE — PROGRESS NOTES
"Chi is a 80 year old who is being evaluated via a billable video visit.      How would you like to obtain your AVS? MyChart  If the video visit is dropped, the invitation should be resent by: Text to cell phone: [phone]  Will anyone else be joining your video visit? No          Assessment & Plan   Problem List Items Addressed This Visit       BPH (benign prostatic hyperplasia) - Primary    Elevated prostate specific antigen (PSA)        Review of the result(s) of each unique test - MRI of prostate  Ordering of each unique test  Prescription drug management  20 minutes spent by me on the date of the encounter doing chart review, history and exam, documentation and further activities per the note       BMI:   Estimated body mass index is 26.29 kg/m  as calculated from the following:    Height as of 2/6/25: 1.63 m (5' 4.17\").    Weight as of 2/6/25: 69.9 kg (154 lb).           No follow-ups on file.    Chi Gaytan MD  [facility]    Subjective   Chi is a 79 year old, presenting for the following health issues:  Elevated PSA    HPI     Follow up for BPH/elevated psa.  He has stopped taking both flomax/proscar.   Prostate MRI in 2023 showed   97 gms with only PIRADs 2 nodule found.  Recent psa was 8's.       Review of Systems   Constitutional, HEENT, cardiovascular, pulmonary, gi and gu systems are negative, except as otherwise noted.      Objective    Vitals - Patient Reported  Weight (Patient Reported): 69.9 kg (154 lb)  Height (Patient Reported): 163 cm (5' 4.17\")  BMI (Based on Pt Reported Ht/Wt): 26.29  Pain Score: No Pain (0)      Vitals:  No vitals were obtained today due to virtual visit.    Physical Exam   GENERAL: Healthy, alert and no distress  EYES: Eyes grossly normal to inspection.  No discharge or erythema, or obvious scleral/conjunctival abnormalities.  RESP: No audible wheeze, cough, or visible cyanosis.  No visible retractions or increased work of breathing.  "   SKIN: Visible skin clear. No significant rash, abnormal pigmentation or lesions.  NEURO: Cranial nerves grossly intact.  Mentation and speech appropriate for age.  PSYCH: Mentation appears normal, affect normal/bright, judgement and insight intact, normal speech and appearance well-groomed.         Elevated psa:  recheck prn  BPH: no urinary symptoms.  Ok to hold off meds.  Rtc when symptoms worsen.        Video-Visit Details    Type of service:  Video Visit   Video Start Time:   Video End Time:    Originating Location (pt. Location): Home    Distant Location (provider location):  On-site  Platform used for Video Visit: [employer]

## 2025-02-28 ENCOUNTER — MYC MEDICAL ADVICE (OUTPATIENT)
Dept: FAMILY MEDICINE | Facility: CLINIC | Age: 80
End: 2025-02-28
Payer: COMMERCIAL

## 2025-03-05 NOTE — TELEPHONE ENCOUNTER
Left message for pt to return call for assistance with scheduling.  Also informed him he could schedule through [employer].  Did not see any openings available for this week like he is requesting.    Supa Colón MA on 3/5/2025 at 1:14 PM

## 2025-03-11 ENCOUNTER — VIRTUAL VISIT (OUTPATIENT)
Dept: FAMILY MEDICINE | Facility: CLINIC | Age: 80
End: 2025-03-11
Payer: COMMERCIAL

## 2025-03-11 DIAGNOSIS — I10 ESSENTIAL HYPERTENSION: Primary | ICD-10-CM

## 2025-03-11 PROCEDURE — 98006 SYNCH AUDIO-VIDEO EST MOD 30: CPT | Performed by: FAMILY MEDICINE

## 2025-03-11 NOTE — PROGRESS NOTES
Chi is a 80 year old who is being evaluated via a billable video visit.    How would you like to obtain your AVS? MyChart  If the video visit is dropped, the invitation should be resent by: Text to cell phone: [phone]  Will anyone else be joining your video visit? No      Assessment & Plan       ICD-10-CM    1. Essential hypertension  I10               The longitudinal plan of care for the diagnosis(es)/condition(s) as documented were addressed during this visit. Due to the added complexity in care, I will continue to support Chi in the subsequent management and with ongoing continuity of care.     A total of 30 minutes spent face-to-face with greater than 50% of the time spent in counseling and coordinating cares of the issues above   There are no Patient Instructions on file for this visit.    Subjective   Chi is a 80 year old, presenting for the following health issues:  Hypertension      3/11/2025    11:27 AM   Additional Questions   Roomed by Chaya   Accompanied by none         3/11/2025    11:27 AM   Patient Reported Additional Medications   Patient reports taking the following new medications none     Video Length:  30 minutes    History of Present Illness       Hypertension: He presents for follow up of hypertension.  He does check blood pressure  regularly outside of the clinic. Outside blood pressures have been over 140/90. He follows a low salt diet.     He eats 2-3 servings of fruits and vegetables daily.He consumes 0 sweetened beverage(s) daily.He exercises with enough effort to increase his heart rate 20 to 29 minutes per day.  He exercises with enough effort to increase his heart rate 4 days per week.   He is taking medications regularly.            BP Readings from Last 6 Encounters:   02/06/25 (!) 151/70   09/23/24 (!) 155/75   06/05/24 135/69   05/07/24 139/68   02/15/24 133/62   02/01/24 (!) 155/72    blood pressure is overall well-controlled on lisinopril 30  mg  130's/60's    Since march 1st- march 5th  120's-130's/70's - 80's    March 9th  120's - 140/60's-70's          Review of Systems  Constitutional, HEENT, cardiovascular, pulmonary, gi and gu systems are negative, except as otherwise noted.      Objective           Vitals:  No vitals were obtained today due to virtual visit.    Physical Exam   GENERAL: alert and no distress  EYES: Eyes grossly normal to inspection.  No discharge or erythema, or obvious scleral/conjunctival abnormalities.  RESP: No audible wheeze, cough, or visible cyanosis.    SKIN: Visible skin clear. No significant rash, abnormal pigmentation or lesions.  NEURO: Cranial nerves grossly intact.  Mentation and speech appropriate for age.  PSYCH: Appropriate affect, tone, and pace of words          Video-Visit Details    Type of service:  Video Visit   Video length: 30 minutes  Originating Location (pt. Location): Home    Distant Location (provider location):  On-site  Platform used for Video Visit: [employer]  Signed Electronically by: Chi Russell MD

## 2025-03-18 ENCOUNTER — TELEPHONE (OUTPATIENT)
Dept: FAMILY MEDICINE | Facility: CLINIC | Age: 80
End: 2025-03-18
Payer: COMMERCIAL

## 2025-03-18 NOTE — TELEPHONE ENCOUNTER
Patient Quality Outreach    Patient is due for the following:   Hypertension -  BP check    Action(s) Taken:   No follow up needed at this time.    Type of outreach:    Chart review performed, no outreach needed. Patient recently seen and informed BP readings at home in note.    Questions for provider review:    None           Chaya Barba CMA

## 2025-04-24 ENCOUNTER — VIRTUAL VISIT (OUTPATIENT)
Dept: FAMILY MEDICINE | Facility: CLINIC | Age: 80
End: 2025-04-24
Payer: COMMERCIAL

## 2025-04-24 DIAGNOSIS — I10 ESSENTIAL HYPERTENSION: Primary | ICD-10-CM

## 2025-04-24 PROCEDURE — 98006 SYNCH AUDIO-VIDEO EST MOD 30: CPT | Performed by: FAMILY MEDICINE

## 2025-04-24 RX ORDER — LISINOPRIL 20 MG/1
20 TABLET ORAL DAILY
Qty: 30 TABLET | Refills: 2 | Status: SHIPPED | OUTPATIENT
Start: 2025-04-24

## 2025-04-24 NOTE — PROGRESS NOTES
Chi is a 80 year old who is being evaluated via a billable video visit.    How would you like to obtain your AVS? MyChart  If the video visit is dropped, the invitation should be resent by: Text to cell phone: [phone]  Will anyone else be joining your video visit? No  {If patient encounters technical issues they should call [phone] :032834}    {PROVIDER CHARTING PREFERENCE:974436}    Subjective   Chi is a 80 year old, presenting for the following health issues:  Hypertension        4/24/2025     4:31 PM   Additional Questions   Roomed by An V.         4/24/2025     4:31 PM   Patient Reported Additional Medications   Patient reports taking the following new medications none     Video Start Time: {video visit start/end time for provider to select:539034}    History of Present Illness       Hypertension: He presents for follow up of hypertension.  He does check blood pressure  regularly outside of the clinic. Outpatient blood pressures have not been over 140/90. He follows a low salt diet.     He eats 2-3 servings of fruits and vegetables daily.He consumes 0 sweetened beverage(s) daily.He exercises with enough effort to increase his heart rate 20 to 29 minutes per day.  He exercises with enough effort to increase his heart rate 4 days per week.   He is taking medications regularly.      125-141/60's  120's-130's most of the time    Vision issues, dizziness, which wears off in the afternoon  {SUPERLIST (Optional):692429}  {additonal problems for provider to add (Optional):206221}    {ROS Picklists (Optional):042791}      Objective    Vitals - Patient Reported  Systolic (Patient Reported): 130  Diastolic (Patient Reported): 70        Physical Exam   {video visit exam brief selected:345040}    {Diagnostic Test Results (Optional):087713}      Video-Visit Details    Type of service:  Video Visit   Video End Time:{video visit start/end time for provider to select:885490}  Originating Location (pt.  "Location): {video visit patient location:158064::\"Home\"}  {PROVIDER LOCATION On-site should be selected for visits conducted from your clinic location or adjoining [facility] hospital, academic office, or other nearby [facility] building. Off-site should be selected for all other provider locations, including home:548188}  Distant Location (provider location):  {virtual location provider:986002}  Platform used for Video Visit: {Virtual Visit Platforms:996896::\"[employer]\"}  Signed Electronically by: Chi Russell MD  {Email feedback regarding this note to [email or web address]   :597655}  "

## 2025-05-16 ENCOUNTER — MYC MEDICAL ADVICE (OUTPATIENT)
Dept: FAMILY MEDICINE | Facility: CLINIC | Age: 80
End: 2025-05-16
Payer: COMMERCIAL

## 2025-05-16 DIAGNOSIS — I10 ESSENTIAL HYPERTENSION: ICD-10-CM

## 2025-05-20 RX ORDER — LISINOPRIL 20 MG/1
20 TABLET ORAL DAILY
Qty: 90 TABLET | Refills: 1 | Status: SHIPPED | OUTPATIENT
Start: 2025-05-20

## 2025-05-20 NOTE — TELEPHONE ENCOUNTER
Routing my chart message to PCP    Pt state lisinopril is working well, requesting 90 day fill to be sent to pharmacy.     Medication and pharmacy pended for provider review.

## 2025-06-19 ENCOUNTER — NURSE TRIAGE (OUTPATIENT)
Dept: FAMILY MEDICINE | Facility: CLINIC | Age: 80
End: 2025-06-19
Payer: COMMERCIAL

## 2025-06-19 NOTE — TELEPHONE ENCOUNTER
Nurse Triage SBAR    Is this a 2nd Level Triage? YES, LICENSED PRACTITIONER REVIEW IS REQUIRED    Situation: Blurry vision, dizziness, brain fog, right hand numbness/tingling.    Background: Patient states the blurry vision, dizziness, and brain fog was present when he was taking lisinopril 30mg. He states that after reducing to 20mg, these symptoms went away, but now returned again as of a couple of weeks ago. He states these symptoms are not as bad as when he took lisinopril 30mg, and all the symptoms are intermittent. Patient also has a new symptom of intermittent right hand/arm numbness and tingling. He stopped taking lisinopril to see if this would help his symptoms, so the last time he took a dose was on Tuesday 6/17. He said his systolic BP has been in the systolic 120s for him since taking lisinopril 20mg, and is still at that level today. He said this blood pressure is too low for him.     At time of this call, patient states he is feeling slightly dizzy, vision is slightly blurry, brain is foggy, and right hand is numb and tingly. He states all of his symptoms other than the hand numbness/tingling is worse in the morning, and gets better throughout the afternoon. The hand numbness/tingling occurs every day about 2-3 times per day. He said that squeezing his hand sometimes helps the symptom go away for a while. During this call, patient states he doesn't remember if he had breakfast and is forgetting other things as well, which is new for him. He states he does not live with anyone so this writer is unable to determine if anyone has noticed a difference in his cognitive function.     Assessment:     Protocol Recommended Disposition:   Call  Now    Recommendation: Please advise how to proceed. I recommended the patient to call 911 (or this writer to call 911 for him), but patient declined this. He states that if he needs to go to the hospital, he can drive himself. I discussed with him why driving  "himself would not be safe considering his vision and cognitive concerns he has voiced to this writer. Patient states the ER will only tell him to stop his lisinopril and send him home. Patient states he would like PCP to review and determine what he should do next. Please advise.      Routed to provider    Does the patient meet one of the following criteria for ADS visit consideration? 16+ years old, with an [facility] PCP     TIP  Providers, please consider if this condition is appropriate for management at one of our Acute and Diagnostic Services sites.     If patient is a good candidate, please use dotphrase <dot>triageresponse and select Refer to ADS to document.    Reason for Disposition   Sudden onset of numbness of the face, arm or leg on one side of the body and present now    Additional Information   Negative: SEVERE weakness (e.g., unable to walk or barely able to walk, requires support) and new-onset or getting worse   Negative: Difficult to awaken or acting confused (e.g., disoriented, slurred speech)     \"Brain fog\" per patient report but conversation with patient is normal, although tangential at times   Negative: Sudden onset of weakness of the face, arm or leg on one side of the body and present now (Exception: Bell's palsy suspected: weakness on one side of the face developing over hours to days, with no other symptoms.)    Protocols used: Neurologic Deficit-A-OH    Verna BOOTH RN  [facility] [place]   "

## 2025-06-19 NOTE — TELEPHONE ENCOUNTER
Recommend ER visit today.  Agree with recommendations.  It's very possible that symptoms are not due to lisinopril use and therefore require ED workup.    Chi Russell MD

## 2025-06-19 NOTE — TELEPHONE ENCOUNTER
Attempt #2 to call patient.     RN left voicemail and requested return call to [facility] at [phone] and ask to speak to a nurse.     Purvi Savage RN   [facility]

## 2025-06-19 NOTE — TELEPHONE ENCOUNTER
Attempt #1 to call patient.     RN left voicemail and requested return call to [facility] at [phone] and ask to speak to a nurse.     Purvi Savage RN   [facility]

## 2025-06-19 NOTE — TELEPHONE ENCOUNTER
Attempt #1 to call patient.     RN left voicemail and requested return call to [facility] at [phone].     CHRISTIE Andrade  [place] Triage RN  [facility]

## 2025-06-19 NOTE — TELEPHONE ENCOUNTER
Since patient is declining emergency room, advise he hold lisinopril and be seen in clinic in next few days in clinic  If patient worsens, then definitely go to emergency room   Please inform patient    Andrez Montano MD

## 2025-06-24 NOTE — TELEPHONE ENCOUNTER
Attempt #3 to call patient.     RN left voicemail and requested return call to [facility] at [phone] and ask to speak to a nurse.     Unable to reach pt for third time. [employer] message sent to pt regarding needing pt to call clinic back. Pt has responded to [employer] message, but dose not think it is emergent situation. Closing encounter. Please reopen if pt call s back wanting to discuss.     Purvi Savage RN   [facility]